# Patient Record
Sex: FEMALE | Race: WHITE | NOT HISPANIC OR LATINO | Employment: PART TIME | URBAN - METROPOLITAN AREA
[De-identification: names, ages, dates, MRNs, and addresses within clinical notes are randomized per-mention and may not be internally consistent; named-entity substitution may affect disease eponyms.]

---
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
## 2020-12-02 ENCOUNTER — IMMUNIZATIONS (OUTPATIENT)
Dept: FAMILY MEDICINE CLINIC | Facility: CLINIC | Age: 20
End: 2020-12-02
Payer: OTHER GOVERNMENT

## 2020-12-02 DIAGNOSIS — Z23 ENCOUNTER FOR IMMUNIZATION: ICD-10-CM

## 2020-12-02 PROCEDURE — 90471 IMMUNIZATION ADMIN: CPT

## 2020-12-02 PROCEDURE — 90686 IIV4 VACC NO PRSV 0.5 ML IM: CPT

## 2020-12-10 ENCOUNTER — ANNUAL EXAM (OUTPATIENT)
Dept: OBGYN CLINIC | Facility: CLINIC | Age: 20
End: 2020-12-10
Payer: OTHER GOVERNMENT

## 2020-12-10 DIAGNOSIS — Z01.419 ENCOUNTER FOR ANNUAL ROUTINE GYNECOLOGICAL EXAMINATION: Primary | ICD-10-CM

## 2020-12-10 PROCEDURE — 99395 PREV VISIT EST AGE 18-39: CPT | Performed by: OBSTETRICS & GYNECOLOGY

## 2021-04-01 ENCOUNTER — OFFICE VISIT (OUTPATIENT)
Dept: FAMILY MEDICINE CLINIC | Facility: CLINIC | Age: 21
End: 2021-04-01
Payer: OTHER GOVERNMENT

## 2021-04-01 VITALS
DIASTOLIC BLOOD PRESSURE: 70 MMHG | WEIGHT: 133.2 LBS | SYSTOLIC BLOOD PRESSURE: 128 MMHG | RESPIRATION RATE: 14 BRPM | BODY MASS INDEX: 24.51 KG/M2 | TEMPERATURE: 97.7 F | HEART RATE: 80 BPM | HEIGHT: 62 IN

## 2021-04-01 DIAGNOSIS — R20.0 NUMBNESS AND TINGLING IN BOTH HANDS: Primary | ICD-10-CM

## 2021-04-01 DIAGNOSIS — R20.2 NUMBNESS AND TINGLING IN BOTH HANDS: Primary | ICD-10-CM

## 2021-04-01 PROCEDURE — 99213 OFFICE O/P EST LOW 20 MIN: CPT | Performed by: PHYSICIAN ASSISTANT

## 2021-04-01 NOTE — PROGRESS NOTES
Assessment/Plan:    1  Numbness and tingling in both hands    - discussed at length, probably due to over use, advised to cut back on texting, knitting, get ergonomic set up for computer for college, try ice and aleve after work although recommended patient should try taking a week of two off work to see if it improves, financially will not work out for her  Get wrist splint to wear for school and work  Can continue to lift weights and work out but if this aggravates to stop  F/u as needed        Subjective:   Chief Complaint   Patient presents with    Numbness and tingling in bilateral hands      Patient ID: Sohan Pelayo is a 21 y o  female  Patient here c/o pins and needles in your hands and feet  Getting progressively worse  Wakes and will be numb, will get better then will happen as day goes on  Pain shoots up to elbow now also  Has been working since beginning of November, while going to school full time and noted this is when it began  Works at [employer], "very hands on"  Numbness started then in November, got better, then got worse again past 3 weeks  But has been working less  Notes she is a only her phone and computer a lot of school, has taken up knitting again, goes to the gym almost daily  Right hand worse then left but is right handed  Noted similar in her foot but thinks that is shoe related, stopped shoes and felt better        The following portions of the patient's history were reviewed and updated as appropriate: allergies, current medications, past family history, past medical history, past social history, past surgical history and problem list     Past Medical History:   Diagnosis Date    Allergic rhinitis 04/07/2010    Anal fissure     last assessed 5/26/15    Dyspepsia 04/07/2010     Past Surgical History:   Procedure Laterality Date    NO PAST SURGERIES       Family History   Problem Relation Age of Onset    Hypothyroidism Mother    [facility] Migraines Mother     No Known Problems Father     Colon cancer Family     Prostate cancer Paternal Grandfather     Substance Abuse Neg Hx     Mental illness Neg Hx     Alcohol abuse Neg Hx      Social History     Socioeconomic History    Marital status: Single     Spouse name: Not on file    Number of children: Not on file    Years of education: Not on file    Highest education level: Not on file   Occupational History    Not on file   Social Needs    Financial resource strain: Not on file    Food insecurity     Worry: Not on file     Inability: Not on file    Transportation needs     Medical: Not on file     Non-medical: Not on file   Tobacco Use    Smoking status: Never Smoker    Smokeless tobacco: Never Used   Substance and Sexual Activity    Alcohol use: Yes     Comment: Socially    Drug use: No    Sexual activity: Yes     Partners: Male     Birth control/protection: I U D  Lifestyle    Physical activity     Days per week: Not on file     Minutes per session: Not on file    Stress: Not on file   Relationships    Social connections     Talks on phone: Not on file     Gets together: Not on file     Attends Baptism service: Not on file     Active member of club or organization: Not on file     Attends meetings of clubs or organizations: Not on file     Relationship status: Not on file    Intimate partner violence     Fear of current or ex partner: Not on file     Emotionally abused: Not on file     Physically abused: Not on file     Forced sexual activity: Not on file   Other Topics Concern    Not on file   Social History Narrative    Always uses seatbelt      Occasional caffeine consumption       Current Outpatient Medications:     Ascorbic Acid (VITAMIN C PO), Take by mouth, Disp: , Rfl:     B Complex-Biotin-FA (VITAMIN B50 COMPLEX PO), Take by mouth, Disp: , Rfl:     COLLAGEN PO, Take by mouth, Disp: , Rfl:     Glucosamine-Chondroitin (GLUCOSAMINE CHONDR COMPLEX PO), Take by mouth, Disp: , Rfl:     levonorgestrel (KYLEENA) 19 5 MG intrauterine device, 1 Intra Uterine Device by Intrauterine route once, Disp: , Rfl:     Multiple Vitamins-Minerals (ONE-A-DAY WOMENS PO), Take by mouth, Disp: , Rfl:     Probiotic Product (PROBIOTIC DAILY PO), Take by mouth, Disp: , Rfl:     Review of Systems          Objective:    Vitals:    04/01/21 1119   BP: 128/70   BP Location: Left arm   Patient Position: Sitting   Cuff Size: Standard   Pulse: 80   Resp: 14   Temp: 97 7 °F (36 5 °C)   TempSrc: Oral   Weight: 60 4 kg (133 lb 3 2 oz)   Height: 5' 2 25" (1 581 m)        Physical Exam  Constitutional:       Appearance: Normal appearance  Cardiovascular:      Rate and Rhythm: Normal rate and regular rhythm  Pulses: Normal pulses  Heart sounds: Normal heart sounds  Pulmonary:      Effort: Pulmonary effort is normal    Musculoskeletal:      Comments: B/l elbows none tender, full ROM, b/l wrists and hands none tender, full ROM  Negative Tinels, positive Phalens, Full strength and sensation b/l   Neurological:      General: No focal deficit present  Mental Status: She is alert and oriented to person, place, and time  Psychiatric:         Mood and Affect: Mood normal          Behavior: Behavior normal          Thought Content:  Thought content normal          Judgment: Judgment normal

## 2021-04-13 DIAGNOSIS — Z23 ENCOUNTER FOR IMMUNIZATION: ICD-10-CM

## 2021-09-16 ENCOUNTER — OFFICE VISIT (OUTPATIENT)
Dept: OBGYN CLINIC | Facility: CLINIC | Age: 21
End: 2021-09-16
Payer: OTHER GOVERNMENT

## 2021-09-16 VITALS
DIASTOLIC BLOOD PRESSURE: 76 MMHG | WEIGHT: 140.2 LBS | HEIGHT: 62 IN | BODY MASS INDEX: 25.8 KG/M2 | SYSTOLIC BLOOD PRESSURE: 122 MMHG

## 2021-09-16 DIAGNOSIS — N93.0 POSTCOITAL BLEEDING: Primary | ICD-10-CM

## 2021-09-16 DIAGNOSIS — N89.8 VAGINAL DISCHARGE: ICD-10-CM

## 2021-09-16 PROCEDURE — 99213 OFFICE O/P EST LOW 20 MIN: CPT | Performed by: OBSTETRICS & GYNECOLOGY

## 2021-09-16 PROCEDURE — 87491 CHLMYD TRACH DNA AMP PROBE: CPT | Performed by: OBSTETRICS & GYNECOLOGY

## 2021-09-16 PROCEDURE — 87591 N.GONORRHOEAE DNA AMP PROB: CPT | Performed by: OBSTETRICS & GYNECOLOGY

## 2021-09-16 PROCEDURE — 87210 SMEAR WET MOUNT SALINE/INK: CPT | Performed by: OBSTETRICS & GYNECOLOGY

## 2021-09-16 NOTE — PROGRESS NOTES
Assessment/Plan:      postcoital bleeding-I reviewed this with her  It was very minimal and is not occurring with every episode of intercourse  We discussed causes  Chlamydia and gonorrhea done today  Wet mount was normal   It could be because her IUD is due to be changed  We discussed doing a Pap but she is returning in December for a Pap and she will be 21 at that time so we will do then  She will schedule IUD removal and reinsertion for November as she would like another one  She will return for her yearly in December  There are no diagnoses linked to this encounter  Subjective:     Patient ID: Aiyana Plascencia is a 21 y o  female  Patient presents for evaluation of bleeding with intercourse  She has a Elveria Maxine IUD placed   This is due to be removed soon  She does get a menstrual cycle, every 3-7 weeks with light bleeding that lasts 5 days  She does have a new partner and 1 month ago she had a very minimal amount of bleeding right after intercourse  It did not last   Then, on Sunday she had some pain with sexual activity and noted a small amount of bleed  She did not need a panty liner or pad  She had intercourse twice in between those 2 episodes with no bleeding  She denies any unusual discharge  Review of Systems   Genitourinary: Positive for vaginal bleeding  Objective:     Physical Exam  Genitourinary:     General: Normal vulva  Vagina: Normal       Cervix: Discharge present  No cervical motion tenderness, friability, lesion, erythema, cervical bleeding or eversion  Uterus: Normal        Comments:   IUD string visible, white clumpy discharge noted  Chlamydia and gonorrhea done

## 2021-09-18 LAB
C TRACH DNA SPEC QL NAA+PROBE: NEGATIVE
N GONORRHOEA DNA SPEC QL NAA+PROBE: NEGATIVE

## 2021-09-20 ENCOUNTER — TELEPHONE (OUTPATIENT)
Dept: OBGYN CLINIC | Facility: CLINIC | Age: 21
End: 2021-09-20

## 2021-09-20 NOTE — TELEPHONE ENCOUNTER
----- Message from Ashley Murray Sa sent at 9/16/2021  6:02 PM EDT -----  Regarding: Sherron Lugo  Patient is coming in on 11/19/2021 at 3:30 for Sherron Lugo Insertion in the [facility] office

## 2021-10-08 ENCOUNTER — OFFICE VISIT (OUTPATIENT)
Dept: FAMILY MEDICINE CLINIC | Facility: CLINIC | Age: 21
End: 2021-10-08
Payer: OTHER GOVERNMENT

## 2021-10-08 VITALS
BODY MASS INDEX: 26.11 KG/M2 | WEIGHT: 141.9 LBS | HEART RATE: 80 BPM | DIASTOLIC BLOOD PRESSURE: 76 MMHG | RESPIRATION RATE: 15 BRPM | SYSTOLIC BLOOD PRESSURE: 112 MMHG | HEIGHT: 62 IN

## 2021-10-08 DIAGNOSIS — R53.83 FATIGUE, UNSPECIFIED TYPE: ICD-10-CM

## 2021-10-08 DIAGNOSIS — R39.9 URINARY SYMPTOM OR SIGN: Primary | ICD-10-CM

## 2021-10-08 LAB
SL AMB  POCT GLUCOSE, UA: NEGATIVE
SL AMB LEUKOCYTE ESTERASE,UA: ABNORMAL
SL AMB POCT BILIRUBIN,UA: NEGATIVE
SL AMB POCT BLOOD,UA: ABNORMAL
SL AMB POCT CLARITY,UA: CLEAR
SL AMB POCT COLOR,UA: YELLOW
SL AMB POCT KETONES,UA: ABNORMAL
SL AMB POCT NITRITE,UA: NEGATIVE
SL AMB POCT PH,UA: 6
SL AMB POCT SPECIFIC GRAVITY,UA: 1
SL AMB POCT URINE PROTEIN: ABNORMAL
SL AMB POCT UROBILINOGEN: 0.2

## 2021-10-08 PROCEDURE — 81002 URINALYSIS NONAUTO W/O SCOPE: CPT | Performed by: PHYSICIAN ASSISTANT

## 2021-10-08 PROCEDURE — 99214 OFFICE O/P EST MOD 30 MIN: CPT | Performed by: PHYSICIAN ASSISTANT

## 2021-10-13 ENCOUNTER — TELEPHONE (OUTPATIENT)
Dept: FAMILY MEDICINE CLINIC | Facility: CLINIC | Age: 21
End: 2021-10-13

## 2021-10-15 ENCOUNTER — APPOINTMENT (OUTPATIENT)
Dept: LAB | Facility: CLINIC | Age: 21
End: 2021-10-15
Payer: OTHER GOVERNMENT

## 2021-10-15 DIAGNOSIS — R39.9 URINARY SYMPTOM OR SIGN: ICD-10-CM

## 2021-10-15 DIAGNOSIS — R53.83 FATIGUE, UNSPECIFIED TYPE: ICD-10-CM

## 2021-10-15 LAB
ALBUMIN SERPL BCP-MCNC: 3.8 G/DL (ref 3.5–5)
ALP SERPL-CCNC: 56 U/L (ref 46–116)
ALT SERPL W P-5'-P-CCNC: 24 U/L (ref 12–78)
ANION GAP SERPL CALCULATED.3IONS-SCNC: 3 MMOL/L (ref 4–13)
AST SERPL W P-5'-P-CCNC: 16 U/L (ref 5–45)
BASOPHILS # BLD AUTO: 0.05 THOUSANDS/ΜL (ref 0–0.1)
BASOPHILS NFR BLD AUTO: 1 % (ref 0–1)
BILIRUB SERPL-MCNC: 0.41 MG/DL (ref 0.2–1)
BUN SERPL-MCNC: 20 MG/DL (ref 5–25)
CALCIUM SERPL-MCNC: 9.3 MG/DL (ref 8.3–10.1)
CHLORIDE SERPL-SCNC: 107 MMOL/L (ref 100–108)
CO2 SERPL-SCNC: 27 MMOL/L (ref 21–32)
CREAT SERPL-MCNC: 0.79 MG/DL (ref 0.6–1.3)
EOSINOPHIL # BLD AUTO: 0.33 THOUSAND/ΜL (ref 0–0.61)
EOSINOPHIL NFR BLD AUTO: 4 % (ref 0–6)
ERYTHROCYTE [DISTWIDTH] IN BLOOD BY AUTOMATED COUNT: 12.2 % (ref 11.6–15.1)
GFR SERPL CREATININE-BSD FRML MDRD: 108 ML/MIN/1.73SQ M
GLUCOSE P FAST SERPL-MCNC: 85 MG/DL (ref 65–99)
HCT VFR BLD AUTO: 42 % (ref 34.8–46.1)
HGB BLD-MCNC: 14.1 G/DL (ref 11.5–15.4)
IMM GRANULOCYTES # BLD AUTO: 0.02 THOUSAND/UL (ref 0–0.2)
IMM GRANULOCYTES NFR BLD AUTO: 0 % (ref 0–2)
LYMPHOCYTES # BLD AUTO: 2.33 THOUSANDS/ΜL (ref 0.6–4.47)
LYMPHOCYTES NFR BLD AUTO: 28 % (ref 14–44)
MCH RBC QN AUTO: 30.5 PG (ref 26.8–34.3)
MCHC RBC AUTO-ENTMCNC: 33.6 G/DL (ref 31.4–37.4)
MCV RBC AUTO: 91 FL (ref 82–98)
MONOCYTES # BLD AUTO: 1.13 THOUSAND/ΜL (ref 0.17–1.22)
MONOCYTES NFR BLD AUTO: 14 % (ref 4–12)
NEUTROPHILS # BLD AUTO: 4.39 THOUSANDS/ΜL (ref 1.85–7.62)
NEUTS SEG NFR BLD AUTO: 53 % (ref 43–75)
NRBC BLD AUTO-RTO: 0 /100 WBCS
PLATELET # BLD AUTO: 240 THOUSANDS/UL (ref 149–390)
PMV BLD AUTO: 10.8 FL (ref 8.9–12.7)
POTASSIUM SERPL-SCNC: 3.9 MMOL/L (ref 3.5–5.3)
PROT SERPL-MCNC: 7 G/DL (ref 6.4–8.2)
RBC # BLD AUTO: 4.63 MILLION/UL (ref 3.81–5.12)
SODIUM SERPL-SCNC: 137 MMOL/L (ref 136–145)
TSH SERPL DL<=0.05 MIU/L-ACNC: 2.02 UIU/ML (ref 0.46–3.98)
WBC # BLD AUTO: 8.25 THOUSAND/UL (ref 4.31–10.16)

## 2021-10-15 PROCEDURE — 84443 ASSAY THYROID STIM HORMONE: CPT

## 2021-10-15 PROCEDURE — 80053 COMPREHEN METABOLIC PANEL: CPT

## 2021-10-15 PROCEDURE — 36415 COLL VENOUS BLD VENIPUNCTURE: CPT

## 2021-10-15 PROCEDURE — 85025 COMPLETE CBC W/AUTO DIFF WBC: CPT

## 2021-10-19 ENCOUNTER — TELEPHONE (OUTPATIENT)
Dept: FAMILY MEDICINE CLINIC | Facility: CLINIC | Age: 21
End: 2021-10-19

## 2021-11-09 ENCOUNTER — PROCEDURE VISIT (OUTPATIENT)
Dept: OBGYN CLINIC | Facility: CLINIC | Age: 21
End: 2021-11-09
Payer: OTHER GOVERNMENT

## 2021-11-09 VITALS
HEIGHT: 62 IN | WEIGHT: 143.6 LBS | DIASTOLIC BLOOD PRESSURE: 72 MMHG | BODY MASS INDEX: 26.43 KG/M2 | SYSTOLIC BLOOD PRESSURE: 114 MMHG

## 2021-11-09 DIAGNOSIS — Z30.433 ENCOUNTER FOR REMOVAL AND REINSERTION OF INTRAUTERINE CONTRACEPTIVE DEVICE (IUD): Primary | ICD-10-CM

## 2021-11-09 PROCEDURE — 58300 INSERT INTRAUTERINE DEVICE: CPT | Performed by: OBSTETRICS & GYNECOLOGY

## 2021-11-09 PROCEDURE — 58301 REMOVE INTRAUTERINE DEVICE: CPT | Performed by: OBSTETRICS & GYNECOLOGY

## 2021-12-16 ENCOUNTER — ANNUAL EXAM (OUTPATIENT)
Dept: OBGYN CLINIC | Facility: CLINIC | Age: 21
End: 2021-12-16
Payer: OTHER GOVERNMENT

## 2021-12-16 VITALS
BODY MASS INDEX: 25.41 KG/M2 | WEIGHT: 143.4 LBS | HEIGHT: 63 IN | SYSTOLIC BLOOD PRESSURE: 116 MMHG | DIASTOLIC BLOOD PRESSURE: 74 MMHG

## 2021-12-16 DIAGNOSIS — Z01.419 ENCOUNTER FOR ANNUAL ROUTINE GYNECOLOGICAL EXAMINATION: Primary | ICD-10-CM

## 2021-12-16 DIAGNOSIS — Z12.4 CERVICAL CANCER SCREENING: ICD-10-CM

## 2021-12-16 PROCEDURE — G0145 SCR C/V CYTO,THINLAYER,RESCR: HCPCS | Performed by: OBSTETRICS & GYNECOLOGY

## 2021-12-16 PROCEDURE — 99395 PREV VISIT EST AGE 18-39: CPT | Performed by: OBSTETRICS & GYNECOLOGY

## 2021-12-22 LAB
LAB AP GYN PRIMARY INTERPRETATION: NORMAL
Lab: NORMAL
PATH INTERP SPEC-IMP: NORMAL

## 2022-05-19 ENCOUNTER — TELEPHONE (OUTPATIENT)
Dept: FAMILY MEDICINE CLINIC | Facility: CLINIC | Age: 22
End: 2022-05-19

## 2022-05-19 DIAGNOSIS — Z71.84 ENCOUNTER FOR COUNSELING FOR TRAVEL: Primary | ICD-10-CM

## 2022-05-19 NOTE — TELEPHONE ENCOUNTER
Please enter an order for a PCR test for travel    She will go to an urgent care to get it done      [phone]

## 2023-03-13 ENCOUNTER — TRANSCRIBE ORDERS (OUTPATIENT)
Dept: SCHEDULING | Age: 23
End: 2023-03-13

## 2023-03-13 DIAGNOSIS — N94.10 DYSPAREUNIA IN FEMALE: Primary | ICD-10-CM

## 2023-03-13 DIAGNOSIS — M62.89 OTHER SPECIFIED DISORDERS OF MUSCLE: ICD-10-CM

## 2023-03-14 ENCOUNTER — OFFICE VISIT (OUTPATIENT)
Dept: FAMILY MEDICINE CLINIC | Facility: CLINIC | Age: 23
End: 2023-03-14

## 2023-03-14 VITALS
HEART RATE: 60 BPM | BODY MASS INDEX: 26.87 KG/M2 | HEIGHT: 62 IN | SYSTOLIC BLOOD PRESSURE: 114 MMHG | RESPIRATION RATE: 16 BRPM | WEIGHT: 146 LBS | OXYGEN SATURATION: 99 % | DIASTOLIC BLOOD PRESSURE: 76 MMHG

## 2023-03-14 DIAGNOSIS — F32.A DEPRESSION, UNSPECIFIED DEPRESSION TYPE: ICD-10-CM

## 2023-03-14 DIAGNOSIS — Z23 IMMUNIZATION DUE: ICD-10-CM

## 2023-03-14 DIAGNOSIS — Z11.1 SCREENING-PULMONARY TB: ICD-10-CM

## 2023-03-14 DIAGNOSIS — R53.83 FATIGUE, UNSPECIFIED TYPE: ICD-10-CM

## 2023-03-14 DIAGNOSIS — Z00.00 ANNUAL PHYSICAL EXAM: Primary | ICD-10-CM

## 2023-03-14 NOTE — PROGRESS NOTES
Assessment/Plan:    1  Depression - sounds like PMDD, will refer to psych for formal evaluation as syn thought possible bipolar and family history is present  Will check TSH  If psych dx depression discussed coming back to use to start prozac  Patient and boyfriend aware of plan  F/u as needed  F/u after eval    Subjective:   Chief Complaint   Patient presents with   • Physical Exam      Patient ID: Shannon Astudillo is a 25 y o  female  The patient reports since graduating patient has noted she has a week or two of month where she feels depressed, down, doesn't want to do anything, doesn't want to talk to anyone, then resolves and for 2-3 weeks feels herself but then returns again for 1-2 weeks  Very cyclical so talked to gyn and said she should come here to discuss this as possible bipolar  Notes it starts what would be mid cycle and then lasts until seh gets her period then improves  During the time she is feeling herself goes to work, happy, active, sleeps 7-8 hours a day  During the weeks she is down sleeps 8+ hours, always wants to sleep  Denies SI, HI  Working a desk job and a coffee shop  Realized she is not happy working desk job and work on this, currently applying to [employer] and starting to shadow a PT  Grandfather was bipolar  Mom with hypothyroid and mild depression             The following portions of the patient's history were reviewed and updated as appropriate: allergies, current medications, past family history, past medical history, past social history, past surgical history and problem list     Past Medical History:   Diagnosis Date   • Allergic rhinitis 04/07/2010   • Anal fissure     last assessed 5/26/15   • Dyspepsia 04/07/2010     Past Surgical History:   Procedure Laterality Date   • NO PAST SURGERIES       Family History   Problem Relation Age of Onset   • Hypothyroidism Mother    • Migraines Mother    • No Known Problems Father    • Mental illness Maternal Grandfather    • Bipolar disorder Maternal Grandfather    • Prostate cancer Paternal Grandfather    • Colon cancer Family    • Substance Abuse Neg Hx    • Alcohol abuse Neg Hx      Social History     Socioeconomic History   • Marital status: Single     Spouse name: Not on file   • Number of children: Not on file   • Years of education: Not on file   • Highest education level: Not on file   Occupational History   • Not on file   Tobacco Use   • Smoking status: Never   • Smokeless tobacco: Never   Vaping Use   • Vaping Use: Never used   Substance and Sexual Activity   • Alcohol use: Yes     Comment: Socially   • Drug use: No   • Sexual activity: Yes     Partners: Male     Birth control/protection: I U D  Other Topics Concern   • Not on file   Social History Narrative    Always uses seatbelt  Occasional caffeine consumption     Social Determinants of Health     Financial Resource Strain: Not on file   Food Insecurity: Not on file   Transportation Needs: Not on file   Physical Activity: Not on file   Stress: Not on file   Social Connections: Not on file   Intimate Partner Violence: Not on file   Housing Stability: Not on file       Current Outpatient Medications:   •  Ascorbic Acid (VITAMIN C PO), Take by mouth, Disp: , Rfl:   •  COLLAGEN PO, Take by mouth, Disp: , Rfl:   •  Glucosamine-Chondroitin (GLUCOSAMINE CHONDR COMPLEX PO), Take by mouth, Disp: , Rfl:   •  levonorgestrel (KYLEENA) 19 5 MG intrauterine device, 1 Intra Uterine Device by Intrauterine route once, Disp: , Rfl:   •  Multiple Vitamins-Minerals (ONE-A-DAY WOMENS PO), Take by mouth, Disp: , Rfl:     Review of Systems          Objective:    Vitals:    03/14/23 0906   BP: 114/76   BP Location: Left arm   Patient Position: Sitting   Cuff Size: Standard   Pulse: 60   Resp: 16   SpO2: 99%   Weight: 66 2 kg (146 lb)   Height: 5' 2" (1 575 m)        Physical Exam  Constitutional:       Appearance: Normal appearance  HENT:      Head: Normocephalic and atraumatic  Cardiovascular:      Rate and Rhythm: Normal rate and regular rhythm  Pulses: Normal pulses  Heart sounds: Normal heart sounds  Pulmonary:      Effort: Pulmonary effort is normal       Breath sounds: Normal breath sounds  Musculoskeletal:         General: Normal range of motion  Skin:     General: Skin is warm  Neurological:      General: No focal deficit present  Mental Status: She is alert and oriented to person, place, and time  Psychiatric:         Mood and Affect: Mood normal          Behavior: Behavior normal          Thought Content:  Thought content normal          Judgment: Judgment normal

## 2023-03-14 NOTE — PROGRESS NOTES
ADULT ANNUAL PHYSICAL  [facility]    NAME: Heidi Cage  AGE: 25 y o  SEX: female  : 2000     DATE: 3/14/2023     Assessment and Plan:     Healthy 25year old female    Immunizations and preventive care screenings were discussed with patient today  Appropriate education was printed on patient's after visit summary  Counseling:  Alcohol/drug use: discussed moderation in alcohol intake, the recommendations for healthy alcohol use, and avoidance of illicit drug use  Dental Health: discussed importance of regular tooth brushing, flossing, and dental visits  Injury prevention: discussed safety/seat belts, safety helmets, smoke detectors, carbon dioxide detectors, and smoking near bedding or upholstery  · Exercise: the importance of regular exercise/physical activity was discussed  Recommend exercise 3-5 times per week for at least 30 minutes  · Tdap, Flu - today  · PPD placed today followup 48-72 hrs to have read, will hold on to paper work until then         Return in 1 year (on 3/14/2024)  Chief Complaint:     Chief Complaint   Patient presents with   • Physical Exam      History of Present Illness:     Adult Annual Physical   Patient here for a comprehensive physical exam  The patient reports since graduating patient has noted she has a week of month where she feels depressed, down, doesn't want to do anything, doesn't want to talk to anyone, then in a few weeks feels fine  Then a few weeks back to normal  Denies SI, HI  Diet and Physical Activity  · Diet/Nutrition: well balanced diet  · Exercise: moderate cardiovascular exercise        Depression Screening  PHQ-2/9 Depression Screening    Little interest or pleasure in doing things: 0 - not at all  Feeling down, depressed, or hopeless: 0 - not at all  PHQ-2 Score: 0  PHQ-2 Interpretation: Negative depression screen       General Health  · Sleep: sleeps well and gets more than 8 hours of sleep on average  · Hearing: normal - bilateral   · Vision: goes for regular eye exams, most recent eye exam <1 year ago and wears glasses  · Dental: regular dental visits and brushes teeth twice daily  /GYN Health  · Last menstrual period: monthly, light  · Contraceptive method: IUD placement  · History of STDs?: no   · Pap done 12/2021     Review of Systems:     Review of Systems   Constitutional: Negative  HENT: Negative  Eyes: Negative  Respiratory: Negative  Cardiovascular: Negative  Gastrointestinal: Negative  Endocrine: Negative  Genitourinary: Negative  Musculoskeletal: Negative  Skin: Negative  Allergic/Immunologic: Negative  Neurological: Negative  Hematological: Negative  Psychiatric/Behavioral: Negative  Past Medical History:     Past Medical History:   Diagnosis Date   • Allergic rhinitis 04/07/2010   • Anal fissure     last assessed 5/26/15   • Dyspepsia 04/07/2010      Past Surgical History:     Past Surgical History:   Procedure Laterality Date   • NO PAST SURGERIES        Social History:     Social History     Socioeconomic History   • Marital status: Single     Spouse name: None   • Number of children: None   • Years of education: None   • Highest education level: None   Occupational History   • None   Tobacco Use   • Smoking status: Never   • Smokeless tobacco: Never   Vaping Use   • Vaping Use: Never used   Substance and Sexual Activity   • Alcohol use: Yes     Comment: Socially   • Drug use: No   • Sexual activity: Yes     Partners: Male     Birth control/protection: I U D  Other Topics Concern   • None   Social History Narrative    Always uses seatbelt      Occasional caffeine consumption     Social Determinants of Health     Financial Resource Strain: Not on file   Food Insecurity: Not on file   Transportation Needs: Not on file   Physical Activity: Not on file   Stress: Not on file   Social Connections: Not on file   Intimate Partner Violence: Not on file   Housing Stability: Not on file      Family History:     Family History   Problem Relation Age of Onset   • Hypothyroidism Mother    • Migraines Mother    • No Known Problems Father    • Colon cancer Family    • Prostate cancer Paternal Grandfather    • Substance Abuse Neg Hx    • Mental illness Neg Hx    • Alcohol abuse Neg Hx       Current Medications:     Current Outpatient Medications   Medication Sig Dispense Refill   • Ascorbic Acid (VITAMIN C PO) Take by mouth     • COLLAGEN PO Take by mouth     • Glucosamine-Chondroitin (GLUCOSAMINE CHONDR COMPLEX PO) Take by mouth     • levonorgestrel (KYLEENA) 19 5 MG intrauterine device 1 Intra Uterine Device by Intrauterine route once     • Multiple Vitamins-Minerals (ONE-A-DAY WOMENS PO) Take by mouth     • B Complex-Biotin-FA (VITAMIN B50 COMPLEX PO) Take by mouth (Patient not taking: Reported on 3/14/2023)     • Probiotic Product (PROBIOTIC DAILY PO) Take by mouth (Patient not taking: Reported on 9/16/2021)       No current facility-administered medications for this visit  Allergies:     No Known Allergies   Physical Exam:     /76 (BP Location: Left arm, Patient Position: Sitting, Cuff Size: Standard)   Pulse 60   Resp 16   Ht 5' 2" (1 575 m)   Wt 66 2 kg (146 lb)   SpO2 99%   BMI 26 70 kg/m²     Physical Exam  Constitutional:       Appearance: Normal appearance  She is well-developed and normal weight  HENT:      Head: Normocephalic and atraumatic  Right Ear: Hearing normal       Left Ear: Hearing normal       Mouth/Throat:      Pharynx: Uvula midline  Eyes:      Extraocular Movements: Extraocular movements intact  Conjunctiva/sclera: Conjunctivae normal       Pupils: Pupils are equal, round, and reactive to light  Neck:      Thyroid: No thyromegaly  Cardiovascular:      Rate and Rhythm: Normal rate and regular rhythm  Pulses: Normal pulses        Heart sounds: Normal heart sounds  No murmur heard  Pulmonary:      Effort: Pulmonary effort is normal       Breath sounds: Normal breath sounds  Abdominal:      General: Abdomen is flat  Bowel sounds are normal  There is no distension  Palpations: Abdomen is soft  There is no mass  Tenderness: There is no abdominal tenderness  Musculoskeletal:         General: Normal range of motion  Cervical back: Normal range of motion and neck supple  Lymphadenopathy:      Cervical: No cervical adenopathy  Skin:     General: Skin is warm  Neurological:      General: No focal deficit present  Mental Status: She is alert and oriented to person, place, and time  Cranial Nerves: No cranial nerve deficit  Deep Tendon Reflexes: Reflexes normal    Psychiatric:         Mood and Affect: Mood normal          Behavior: Behavior normal          Thought Content: Thought content normal          Judgment: Judgment normal           Char Wallace PA-C   [address]     BMI Counseling: Body mass index is 26 7 kg/m²  The BMI is above normal  Nutrition recommendations include reducing portion sizes

## 2023-03-15 ENCOUNTER — TELEPHONE (OUTPATIENT)
Dept: PSYCHIATRY | Facility: CLINIC | Age: 23
End: 2023-03-15

## 2023-03-15 NOTE — TELEPHONE ENCOUNTER
Called patient regarding referral to be placed on proper wait list  LVM for patient to call intake dept ([phone]) back

## 2023-03-16 ENCOUNTER — CLINICAL SUPPORT (OUTPATIENT)
Dept: FAMILY MEDICINE CLINIC | Facility: CLINIC | Age: 23
End: 2023-03-16

## 2023-03-16 DIAGNOSIS — Z11.1 ENCOUNTER FOR PPD SKIN TEST READING: Primary | ICD-10-CM

## 2023-03-16 LAB
INDURATION: 0 MM
TB SKIN TEST: NEGATIVE

## 2023-03-20 NOTE — TELEPHONE ENCOUNTER
Patient has been added to the Medication Management and Talk Therapy wait list     Confirmed Insurance: Yes [x][employer]   Location Preference: [place]   Provider Preference: none  Virtual: Yes [] No [x]

## 2023-04-06 ENCOUNTER — TELEPHONE (OUTPATIENT)
Dept: REGISTRATION | Facility: CLINIC | Age: 23
End: 2023-04-06
Payer: COMMERCIAL

## 2023-04-06 ENCOUNTER — HOSPITAL ENCOUNTER (OUTPATIENT)
Dept: PHYSICAL THERAPY | Age: 23
Setting detail: THERAPIES SERIES
Discharge: HOME | End: 2023-04-06
Attending: NURSE PRACTITIONER
Payer: COMMERCIAL

## 2023-04-06 DIAGNOSIS — M62.89 OTHER SPECIFIED DISORDERS OF MUSCLE: ICD-10-CM

## 2023-04-06 DIAGNOSIS — N94.10 DYSPAREUNIA IN FEMALE: ICD-10-CM

## 2023-04-06 DIAGNOSIS — M62.89 PELVIC FLOOR DYSFUNCTION: Primary | ICD-10-CM

## 2023-04-06 LAB — TSH SERPL-ACNC: 1.84 MIU/L

## 2023-04-06 PROCEDURE — 97161 PT EVAL LOW COMPLEX 20 MIN: CPT | Mod: GP

## 2023-04-06 PROCEDURE — 97530 THERAPEUTIC ACTIVITIES: CPT | Mod: GP

## 2023-04-06 RX ORDER — MULTIVIT WITH CALCIUM,IRON,MIN 18MG-0.4MG
TABLET ORAL SEE ADMIN INSTRUCTIONS
COMMUNITY

## 2023-04-06 RX ORDER — MULTIVIT-MIN/IRON/FOLIC ACID/K 18-600-40
CAPSULE ORAL SEE ADMIN INSTRUCTIONS
COMMUNITY

## 2023-04-06 NOTE — LETTER
[address] [place] [id number]      Dear DR. Jackson,      Thank you for this referral. Please review the attached notes and plan of care for your approval.  Please contact our department with any questions.     Sincerely,     Andree Kim, PT      Referring Provider: By co-signing this Plan of Care (POC) either electronically or physically you agree to the following:    I have reviewed the the Plan of Care established by the therapist within this document and certify that the services are skilled and medically necessary. I have reviewed the plan and recommend that these services continue to meet the goals stated in this document.       EXTERNAL PROVIDER FAXING BACK:    PHYSICIAN SIGNATURE: __________________________________     DATE: ___________________  TIME: _____________    IMPORTANT:  If returning this Plan of Care by fax, please fax back ONLY the signature page.   _________________________________________________________________________      [facility] Fax: [phone]        PT EVALUATION FOR OUTPATIENT THERAPY    Patient: Mini Hodges  MRN: [id number]  : 2000 22 y.o.   Referring Physician: Marbella Jackson CRNP  Date of Visit: 2023      Certification Dates:  23 through 23         Recommended Frequency & Duration:  1 time/week for up to 3 months     Diagnosis:   1. Pelvic floor dysfunction    2. Dyspareunia in female    3. Other specified disorders of muscle        Chief Complaints: No chief complaint on file.      Precautions: no known precautions/restrictions  Precautions additional comments:      Past Medical History: No past medical history on file.    Past Surgical History: No past surgical history on file.      LEARNING ASSESSMENT    Assessment completed:  Yes    Learner name:  Mini Hodges    Learner: Patient    Learning Barriers:  Learning barriers: No Barriers    Preferred Language: English     Needed: No    Education Provided:   Method:  Discussion and Demonstration  Readiness: acceptance  Response: Demonstrated understanding and Verbalizes understanding      CO-LEARNER ASSESSMENT:    Completed: No          Welcome letter discussed: Yes Patient provided with Welcome Letter, which includes attendance policy. Provided education regarding cancellation and no-show policy. Education regarding the importance of participation and regular attendance to maximize goal attainment.         OBJECTIVE MEASUREMENTS/DATA:    Time In Session:  Start Time: 0901  Stop Time: 1000  Time Calculation (min): 59 min   Assessment and Plan - 04/06/23 0859        Assessment    Plan of Care reviewed and patient/family in agreement No     System Pathology/Pathophysiology Noted musculoskeletal;neuromuscular;genitourinary     Problem List abnormal muscle tone;decreased endurance;decreased strength;decreased flexibility;impaired coordination;pain     Clinical Assessment Mini is a 23 y/o cis gender female presenting to PFPT with c/o pelvic floor dysfunction including dyspareunia and pain with pelvic exams. She appear to have muscle guarding present with TTP reported in diaphram and lower abdomen which could be contributing to her symptoms. Due to extensive subjective history, unable to complete objective assessment today, plan to continue gathering objective measures at subsequent visits. Pt educated on abdominal tension awareness and diaphragmatic breathing. She will benefit from continued skilled PT to reduce pain and improve function of PFm.     Plan and Recommendations 1x a week for 6-8 visits, biweekly 4-6 visits     Planned Services CPT 12212 Therapeutic exercises;CPT 97646 Therapeutic activities;CPT 45104 Self-care/Home management training;CPT 01230 Neuromuscular Reeducation;CPT 87027 Manual therapy;CPT 79861 Gait training                General Information - 04/06/23 0859        Session Details    Document Type initial evaluation     Mode of Treatment individual therapy         General Information    Referring Physician ABENA Talbert     History of present illness/functional impairment Mini is a 21 y/o cis gender female referred to PFPT by obgyn with c/o dyspareunia and pain with pelvic exams.     Existing Precautions/Restrictions no known precautions/restrictions                Pain/Vitals - 04/06/23 0859        Pain Assessment    Currently in pain No/Denies        Pre Activity Vital Signs    /80     BP Location Left upper arm     BP Method Manual     Patient Position Sitting                Falls/Food Screening - 04/06/23 0859        Initial Falls Assessment    One or more falls in the last year No                PT - 04/06/23 0859        Physical Therapy    Physical Therapy Specialty Pelvic Floor Program: Age 14+        PT Plan    Frequency of treatment 1 time/week     PT Duration 3 months     PT Cert From 04/06/23     PT Cert To 07/05/23     Date PT POC was sent to provider 04/06/23     Signed PT Plan of Care received?  No                   Outcome Measures    PT Outcome Measures - 04/06/23 0859        NEW (2/6/23):  PSFS     PSFS ACTIVITY 1 sexual activity     PSFS ANSWER 1 3     PSFS ACTIVITY 2 Gynecology exams     PSFS ANSWER 2 5     PSFS Sum of Activity Scores 8     PSFS Number of Activities 2     PSFS Percent Score 40 %                  Goals     •  Mutually agreed upon pain goal       Mutually agreed upon pain goal: 0/10      •  pelvic goals (pt-stated)       In 8 weeks Mini will:     Be (I) with pelvic floor contraction/ relaxation/ bearing down in order to improve normal bowel and bladder function.     (I) with diaphragmatic breathing for improved Pfm tissue mobility     be able to tolerate digital palpation of pelvic floor as well as digital insertion and removal with pain reduce by 30% in order to tolerate pelvic exams.     improve PSFS by 30% in order to improve QOL as well as pelvic floor function.     In 16 weeks Mini will:   (I) with HEP in order  to maintain and aide in gains made with skilled PT services    (I) with toilet posture and bowel management in order to improve bowel function-     Increase fiber intake by 10-15 grams in order to improve bowel function.     be able to tolerate digital palpation of pelvic floor as well as digital insertion and removal with pain reduce by 80% in order to tolerate pelvic exams.      improve PSFS by 80% in order to improve QOL as well as pelvic floor function.     be (I) with self abdominal myofascial massage to decrease abdominal pain and dysfunction.     Improve gluteal strength by 1/2 MMT GEO in order to decrease demand on pelvic floor and improve overall functional mobility.     Reduced TTP and fascial restrictions in abdomen by 50% in order to improve pelvic floor function and QOL.    Return to pelvic exams without signs/ symptoms of pelvic floor dysfunction.        •  stop having pain with sexual activity (pt-stated)               TREATMENT PLAN:    Mini is a 21 y/o cis gender female presenting to PFPT with c/o dyspareunia and pain with pelvic exams.  Mini went to the gynecologist about a few month ago, she has been having pain with intercourse and was referred to PFPT. She notes always having pain with insertion.     Bladder: Comment:        Urination frequency 6-7x a day    Urgency:  Can hold if she needs to, will sometimes have cramping since her IUD   Incontinence Denies    Pads denies   Nocturia denies   Pain 1x a night    Emptying Does not need to strain, feels like she fully   Prolapse symptoms  Denies    Liquid consumption Drinks about 3-4 refills of 24 oz water bottle, drinks about 3 cups of coffee, pre workout if she is going to the gym   UTI history When she was first sexually active she had 3 UTIs in a month, has not had a UTI recently          Bowel: Comment:        Frequency 2-3x a day, typically spread throughout the day, does not need to strain   Urge Strong normal urge, can make it   "  Incontinence denies   Emptying Feels fully empty when she finishes   [place] stool Type 3-4   Fiber Will take fiber, green powder, drink more water and then will take laxatives. Will start taking supplements if she misses a day    Does meal prep, oatmeal or breakfast sandwich or eggs/veggies, potatoes or rice with chicken or turkey for lunch, dinner protein starch and veggie, does a lot protein, will snack on rice cakes and fruit or protein bars and yogurt   Management Fiber, green powder, water and laxatives   Pain Had a hemorrhoid once about a few years ago, denies pain with BMs         OBGYN Comment:        Pregnancies 0   Births 0   Birth type n/a   Tears/ episiotomies n/a   Surgery No surgeries   Menstruation IUD has been lighter flow, consistent and regular, lots of cramping but has been less since her IUD. Prior heavier flow, was getting a lot of cramping                    Sexual Activity Comment:        Type Penis vaginal    Pain Initial insertion will be pain, lasts about 2 minutes, feels it reduces after \"muscle relax,\" will have burning/tearing sensation     Does not notice a difference in pain based on position    Tried lubrication once or twice, will have burning but unsure if it was from initial insertion or the lubrication     History of bleeding post intercourse     Will have discomfort for about 5 min    Always has had pain with intercourse     At worst: 7/10   Post intercourse discomfort: 3-4/10    Orgasm Yes    Masturbation Yes, denies pain   Libido Feels it is nonexistence, feels that it could be related to her pain with intercourse    Pain with speculum insertion          Pain Comment:        Low back pain Went for PT previously, has not been noticing back pain since                                Other Comment:        Physical activity  Go to the gym, will do lifting and cardio, walks outside/hiking, 3x a week typically   PLOF Previous history of dancing, did ballet and modern in college "   Living environment Lives with her boyfriend, apartment    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 denies  Problems with stumbling or falling? Denies     Cauda equina questions:  Problems with bowel and bladder control? Specifically retention? See above  Pins and needles or numbness in the saddle area? Denies     Review of systems:  General - (chills, night sweats, recent infection, fever, weight loss/gain, unexplained night pain, excessive fatigue): Denies  Do you have a history of cancer? Denies  Gastrointestinal system - (abdominal pain, bowel changes, nausea, vomiting, bloating): Bloating that is inconsistent, might have it prior to her menstrual period   Cardiovascular system - (chest pain, palpitations, orthopnea, other): Denies  Respiratory system - (cough, SOB, sputum production, other): Denies  Musculoskeletal system: osteoporosis, vertebral fracture? Denies  Endocrine - (polyuria, polydipsia, heat or cold intolerance, other): Denies  Neurological - (numbness/tingling, falling/stumbling, HA, dizziness, diplopia, dysphagia/dysarthria, double vision, tinnitus, memory, drop attacks, other): Denies  Any long-term steroid use? Denies      Patient goals stop having pain with sexual activity     OBJECTIVE FINDINGS:      ASSESSMENT   PELVIS/POSTURE            LUMBAR AROM    Flexion    Extension    rotation    Sidebending        HIP ROM    ER    IR    Flexion    Extension    Ankle ROM    Lower Extremity Strength    hip flexion    hip extension    Hip IR    Hip ER    Hip abduction    Hip adduction    Knee    Ankle            SPECIAL TESTS    Hamstring length    Hip FADIR    Hip SCOUR    Trendelenburg    Slump test    SLR test    BAM            LUMBAR PALPATION    HIP  PALPATION    OTHER PALPATION        SI JOINT TESTING     S/L Compression test    Thigh thrust test    Prone sacral thrust test    Gaenslen test        ABDOMEN    palpation Muscle guarding present and TTP reported with diaphragm palpation and lower abdomen on L>R side   Lower rib angle 90 degrees   Breathing pattern Min to moderate diaphragm engagement with breathing   Diastasis    Skin/ integumentary        BALANCE    GAIT MECHANICS    RUNNING MECHANICS    SQUATTING MECHANICS    LUNGING MECHANICS    BED MOBILITY    SIT TO STAND      Verbal consent give for internal evaluation and treatment. Held today, plan to assess at future visit with pt consent      ASSESSMENT   EXTERNAL genital EXAM    PFM OBSERVATION    Skin integrity    contraction    relaxation    Bulge/pelvic drop    Cough    Light touch sensation    INTERNAL PFM exam Internal vaginal   Levator Ani Strength     Power    Endurance    Repetitions    Quick Flicks            Palpation            TAILBONE                    TREATMENT LOG:  verbal consent for internal intervention: Held today, plan to assess at future visit with pt consent     Diagnosis  Pelvic Floor Dysfunction    Precautions       PT INITIAL EVALUATION:   4/6/23     PT PROGRESS NOTE:        Y/N Treatment Details: Time:   MODALITIES  23923   0 mins   Heat       Ice       THER ACT          68862   10 mins   OUTCOME MEASURES Y PSFS: 40%    Falls Screen, Medication Review, VS, pain assessment Y      Pt Education:verbalized understanding of education and returned demonstration appropriately  Y Pelvic floor anatomy/ purpose function, POC, breathing pattern, abdominal tension influence on PFm, pain cycle of vagina     HEP / HEP Review Y  eval: quadruped diaphragmatic breathing, incorporating breathing throughout day     THER EX         04077   0 mins                                    NEURO RE-ED    43008   Billed with TA    Diaphragmatic breathing y In quadruped, x10                  MANUAL                  33841   0 mins   Joint Mobilization       Soft Tissue mobilizations External      Soft Tissue Mobilization Internal      IASTM                          ASSESSMENT:    This 22 y.o. year old female presents to PT with above stated diagnosis. Physical Therapy evaluation reveals abnormal muscle tone, decreased endurance, decreased strength, decreased flexibility, impaired coordination, pain resulting in   limitations. Mini Hodges will benefit from skilled PT services to address limitation, work towards rehab and patient goals and maximize PLOF of chosen ADLs.     Planned Services: The patient's treatment will include CPT 16415 Therapeutic exercises, CPT 03330 Therapeutic activities, CPT 30663 Self-care/Home management training, CPT 05519 Neuromuscular Reeducation, CPT 75480 Manual therapy, CPT 92989 Gait training, .

## 2023-04-06 NOTE — OP PT TREATMENT LOG
Mini is a 21 y/o cis gender female presenting to PFPT with c/o dyspareunia and pain with pelvic exams.  Mini went to the gynecologist about a few month ago, she has been having pain with intercourse and was referred to PFPT. She notes always having pain with insertion.     Bladder: Comment:        Urination frequency 6-7x a day    Urgency:  Can hold if she needs to, will sometimes have cramping since her IUD   Incontinence Denies    Pads denies   Nocturia denies   Pain 1x a night    Emptying Does not need to strain, feels like she fully   Prolapse symptoms  Denies    Liquid consumption Drinks about 3-4 refills of 24 oz water bottle, drinks about 3 cups of coffee, pre workout if she is going to the gym   UTI history When she was first sexually active she had 3 UTIs in a month, has not had a UTI recently          Bowel: Comment:        Frequency 2-3x a day, typically spread throughout the day, does not need to strain   Urge Strong normal urge, can make it    Incontinence denies   Emptying Feels fully empty when she finishes   [place] stool Type 3-4   Fiber Will take fiber, green powder, drink more water and then will take laxatives. Will start taking supplements if she misses a day    Does meal prep, oatmeal or breakfast sandwich or eggs/veggies, potatoes or rice with chicken or turkey for lunch, dinner protein starch and veggie, does a lot protein, will snack on rice cakes and fruit or protein bars and yogurt   Management Fiber, green powder, water and laxatives   Pain Had a hemorrhoid once about a few years ago, denies pain with BMs         OBGYN Comment:        Pregnancies 0   Births 0   Birth type n/a   Tears/ episiotomies n/a   Surgery No surgeries   Menstruation IUD has been lighter flow, consistent and regular, lots of cramping but has been less since her IUD. Prior heavier flow, was getting a lot of cramping                    Sexual Activity Comment:        Type Penis vaginal    Pain Initial insertion  "will be pain, lasts about 2 minutes, feels it reduces after \"muscle relax,\" will have burning/tearing sensation     Does not notice a difference in pain based on position    Tried lubrication once or twice, will have burning but unsure if it was from initial insertion or the lubrication     History of bleeding post intercourse     Will have discomfort for about 5 min    Always has had pain with intercourse     At worst: 7/10   Post intercourse discomfort: 3-4/10    Orgasm Yes    Masturbation Yes, denies pain   Libido Feels it is nonexistence, feels that it could be related to her pain with intercourse    Pain with speculum insertion          Pain Comment:        Low back pain Went for PT previously, has not been noticing back pain since                                Other Comment:        Physical activity  Go to the gym, will do lifting and cardio, walks outside/hiking, 3x a week typically   PLOF Previous history of dancing, did ballet and modern in college   Living environment Lives with her boyfriend, apartment    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 denies  Problems with stumbling or falling? Denies     Cauda equina questions:  Problems with bowel and bladder control? Specifically retention? See above  Pins and needles or numbness in the saddle area? Denies     Review of systems:  General - (chills, night sweats, recent infection, fever, weight loss/gain, unexplained night pain, excessive fatigue): Denies  Do you have a history of cancer? Denies  Gastrointestinal system - (abdominal pain, bowel changes, nausea, vomiting, bloating): Bloating that is inconsistent, might have it prior to her menstrual period   Cardiovascular system - (chest pain, palpitations, orthopnea, " other): Denies  Respiratory system - (cough, SOB, sputum production, other): Denies  Musculoskeletal system: osteoporosis, vertebral fracture? Denies  Endocrine - (polyuria, polydipsia, heat or cold intolerance, other): Denies  Neurological - (numbness/tingling, falling/stumbling, HA, dizziness, diplopia, dysphagia/dysarthria, double vision, tinnitus, memory, drop attacks, other): Denies  Any long-term steroid use? Denies      Patient goals stop having pain with sexual activity     OBJECTIVE FINDINGS:      ASSESSMENT   PELVIS/POSTURE            LUMBAR AROM    Flexion    Extension    rotation    Sidebending        HIP ROM    ER    IR    Flexion    Extension    Ankle ROM    Lower Extremity Strength    hip flexion    hip extension    Hip IR    Hip ER    Hip abduction    Hip adduction    Knee    Ankle            SPECIAL TESTS    Hamstring length    Hip FADIR    Hip SCOUR    Trendelenburg    Slump test    SLR test    BAM            LUMBAR PALPATION    HIP PALPATION    OTHER PALPATION        SI JOINT TESTING     S/L Compression test    Thigh thrust test    Prone sacral thrust test    Gaenslen test        ABDOMEN    palpation Muscle guarding present and TTP reported with diaphragm palpation and lower abdomen on L>R side   Lower rib angle 90 degrees   Breathing pattern Min to moderate diaphragm engagement with breathing   Diastasis    Skin/ integumentary        BALANCE    GAIT MECHANICS    RUNNING MECHANICS    SQUATTING MECHANICS    LUNGING MECHANICS    BED MOBILITY    SIT TO STAND      Verbal consent give for internal evaluation and treatment. Held today, plan to assess at future visit with pt consent      ASSESSMENT   EXTERNAL genital EXAM    PFM OBSERVATION    Skin integrity    contraction    relaxation    Bulge/pelvic drop    Cough    Light touch sensation    INTERNAL PFM exam Internal vaginal   Levator Ani Strength     Power    Endurance    Repetitions    Quick Flicks            Palpation            TAILBONE                     TREATMENT LOG:  verbal consent for internal intervention: Held today, plan to assess at future visit with pt consent     Diagnosis  Pelvic Floor Dysfunction    Precautions       PT INITIAL EVALUATION:   4/6/23     PT PROGRESS NOTE:        Y/N Treatment Details: Time:   MODALITIES  02010   0 mins   Heat       Ice       THER ACT          67011   10 mins   OUTCOME MEASURES Y PSFS: 40%    Falls Screen, Medication Review, VS, pain assessment Y      Pt Education:verbalized understanding of education and returned demonstration appropriately  Y Pelvic floor anatomy/ purpose function, POC, breathing pattern, abdominal tension influence on PFm, pain cycle of vagina     HEP / HEP Review Y  eval: quadruped diaphragmatic breathing, incorporating breathing throughout day     THER EX         45689   0 mins                                    NEURO RE-ED    98205   Billed with TA    Diaphragmatic breathing y In quadruped, x10                  MANUAL                 70954   0 mins   Joint Mobilization       Soft Tissue mobilizations External      Soft Tissue Mobilization Internal      IASTM

## 2023-04-06 NOTE — PROGRESS NOTES
Referring Provider: By co-signing this Plan of Care (POC) either electronically or physically you agree to the following:    I have reviewed the the Plan of Care established by the therapist within this document and certify that the services are skilled and medically necessary. I have reviewed the plan and recommend that these services continue to meet the goals stated in this document.       EXTERNAL PROVIDER FAXING BACK:    PHYSICIAN SIGNATURE: __________________________________     DATE: ___________________  TIME: _____________    IMPORTANT:  If returning this Plan of Care by fax, please fax back ONLY the signature page.   _________________________________________________________________________      [facility] Fax: [phone]        PT EVALUATION FOR OUTPATIENT THERAPY    Patient: Mini Hodges  MRN: [id number]  : 2000 22 y.o.   Referring Physician: Marbella Jackson CRNP  Date of Visit: 2023      Certification Dates:  23 through 23         Recommended Frequency & Duration:  1 time/week for up to 3 months     Diagnosis:   1. Pelvic floor dysfunction    2. Dyspareunia in female    3. Other specified disorders of muscle        Chief Complaints: No chief complaint on file.      Precautions: no known precautions/restrictions  Precautions additional comments:      Past Medical History: No past medical history on file.    Past Surgical History: No past surgical history on file.      LEARNING ASSESSMENT    Assessment completed:  Yes    Learner name:  Mini Hodges    Learner: Patient    Learning Barriers:  Learning barriers: No Barriers    Preferred Language: English     Needed: No    Education Provided:   Method: Discussion and Demonstration  Readiness: acceptance  Response: Demonstrated understanding and Verbalizes understanding      CO-LEARNER ASSESSMENT:    Completed: No          Welcome letter discussed: Yes Patient provided with Welcome Letter, which includes attendance  policy. Provided education regarding cancellation and no-show policy. Education regarding the importance of participation and regular attendance to maximize goal attainment.         OBJECTIVE MEASUREMENTS/DATA:    Time In Session:  Start Time: 0901  Stop Time: 1000  Time Calculation (min): 59 min   Assessment and Plan - 04/06/23 0859        Assessment    Plan of Care reviewed and patient/family in agreement No     System Pathology/Pathophysiology Noted musculoskeletal;neuromuscular;genitourinary     Problem List abnormal muscle tone;decreased endurance;decreased strength;decreased flexibility;impaired coordination;pain     Clinical Assessment Mini is a 23 y/o cis gender female presenting to PFPT with c/o pelvic floor dysfunction including dyspareunia and pain with pelvic exams. She appear to have muscle guarding present with TTP reported in diaphram and lower abdomen which could be contributing to her symptoms. Due to extensive subjective history, unable to complete objective assessment today, plan to continue gathering objective measures at subsequent visits. Pt educated on abdominal tension awareness and diaphragmatic breathing. She will benefit from continued skilled PT to reduce pain and improve function of PFm.     Plan and Recommendations 1x a week for 6-8 visits, biweekly 4-6 visits     Planned Services CPT 45569 Therapeutic exercises;CPT 78503 Therapeutic activities;CPT 07771 Self-care/Home management training;CPT 16431 Neuromuscular Reeducation;CPT 93688 Manual therapy;CPT 58887 Gait training                General Information - 04/06/23 0859        Session Details    Document Type initial evaluation     Mode of Treatment individual therapy        General Information    Referring Physician ABENA Talbert     History of present illness/functional impairment Mini is a 23 y/o cis gender female referred to PFPT by obgyn with c/o dyspareunia and pain with pelvic exams.     Existing  Precautions/Restrictions no known precautions/restrictions                Pain/Vitals - 04/06/23 0859        Pain Assessment    Currently in pain No/Denies        Pre Activity Vital Signs    /80     BP Location Left upper arm     BP Method Manual     Patient Position Sitting                Falls/Food Screening - 04/06/23 0859        Initial Falls Assessment    One or more falls in the last year No                PT - 04/06/23 0859        Physical Therapy    Physical Therapy Specialty Pelvic Floor Program: Age 14+        PT Plan    Frequency of treatment 1 time/week     PT Duration 3 months     PT Cert From 04/06/23     PT Cert To 07/05/23     Date PT POC was sent to provider 04/06/23     Signed PT Plan of Care received?  No                   Outcome Measures    PT Outcome Measures - 04/06/23 0859        NEW (2/6/23):  PSFS     PSFS ACTIVITY 1 sexual activity     PSFS ANSWER 1 3     PSFS ACTIVITY 2 Gynecology exams     PSFS ANSWER 2 5     PSFS Sum of Activity Scores 8     PSFS Number of Activities 2     PSFS Percent Score 40 %                  Goals     •  Mutually agreed upon pain goal       Mutually agreed upon pain goal: 0/10      •  pelvic goals (pt-stated)       In 8 weeks Mini will:     Be (I) with pelvic floor contraction/ relaxation/ bearing down in order to improve normal bowel and bladder function.     (I) with diaphragmatic breathing for improved Pfm tissue mobility     be able to tolerate digital palpation of pelvic floor as well as digital insertion and removal with pain reduce by 30% in order to tolerate pelvic exams.     improve PSFS by 30% in order to improve QOL as well as pelvic floor function.     In 16 weeks Mini will:   (I) with HEP in order to maintain and aide in gains made with skilled PT services    (I) with toilet posture and bowel management in order to improve bowel function-     Increase fiber intake by 10-15 grams in order to improve bowel function.     be able to tolerate  digital palpation of pelvic floor as well as digital insertion and removal with pain reduce by 80% in order to tolerate pelvic exams.      improve PSFS by 80% in order to improve QOL as well as pelvic floor function.     be (I) with self abdominal myofascial massage to decrease abdominal pain and dysfunction.     Improve gluteal strength by 1/2 MMT GEO in order to decrease demand on pelvic floor and improve overall functional mobility.     Reduced TTP and fascial restrictions in abdomen by 50% in order to improve pelvic floor function and QOL.    Return to pelvic exams without signs/ symptoms of pelvic floor dysfunction.        •  stop having pain with sexual activity (pt-stated)               TREATMENT PLAN:    Mini is a 23 y/o cis gender female presenting to PFPT with c/o dyspareunia and pain with pelvic exams.  Mini went to the gynecologist about a few month ago, she has been having pain with intercourse and was referred to PFPT. She notes always having pain with insertion.     Bladder: Comment:        Urination frequency 6-7x a day    Urgency:  Can hold if she needs to, will sometimes have cramping since her IUD   Incontinence Denies    Pads denies   Nocturia denies   Pain 1x a night    Emptying Does not need to strain, feels like she fully   Prolapse symptoms  Denies    Liquid consumption Drinks about 3-4 refills of 24 oz water bottle, drinks about 3 cups of coffee, pre workout if she is going to the gym   UTI history When she was first sexually active she had 3 UTIs in a month, has not had a UTI recently          Bowel: Comment:        Frequency 2-3x a day, typically spread throughout the day, does not need to strain   Urge Strong normal urge, can make it    Incontinence denies   Emptying Feels fully empty when she finishes   [place] stool Type 3-4   Fiber Will take fiber, green powder, drink more water and then will take laxatives. Will start taking supplements if she misses a day    Does meal prep,  "oatmeal or breakfast sandwich or eggs/veggies, potatoes or rice with chicken or turkey for lunch, dinner protein starch and veggie, does a lot protein, will snack on rice cakes and fruit or protein bars and yogurt   Management Fiber, green powder, water and laxatives   Pain Had a hemorrhoid once about a few years ago, denies pain with BMs         OBGYN Comment:        Pregnancies 0   Births 0   Birth type n/a   Tears/ episiotomies n/a   Surgery No surgeries   Menstruation IUD has been lighter flow, consistent and regular, lots of cramping but has been less since her IUD. Prior heavier flow, was getting a lot of cramping                    Sexual Activity Comment:        Type Penis vaginal    Pain Initial insertion will be pain, lasts about 2 minutes, feels it reduces after \"muscle relax,\" will have burning/tearing sensation     Does not notice a difference in pain based on position    Tried lubrication once or twice, will have burning but unsure if it was from initial insertion or the lubrication     History of bleeding post intercourse     Will have discomfort for about 5 min    Always has had pain with intercourse     At worst: 7/10   Post intercourse discomfort: 3-4/10    Orgasm Yes    Masturbation Yes, denies pain   Libido Feels it is nonexistence, feels that it could be related to her pain with intercourse    Pain with speculum insertion          Pain Comment:        Low back pain Went for PT previously, has not been noticing back pain since                                Other Comment:        Physical activity  Go to the gym, will do lifting and cardio, walks outside/hiking, 3x a week typically   PLOF Previous history of dancing, did ballet and modern in college   Living environment Lives with her boyfriend, apartment    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 denies  Problems with stumbling or falling? Denies     Cauda equina questions:  Problems with bowel and bladder control? Specifically retention? See above  Pins and needles or numbness in the saddle area? Denies     Review of systems:  General - (chills, night sweats, recent infection, fever, weight loss/gain, unexplained night pain, excessive fatigue): Denies  Do you have a history of cancer? Denies  Gastrointestinal system - (abdominal pain, bowel changes, nausea, vomiting, bloating): Bloating that is inconsistent, might have it prior to her menstrual period   Cardiovascular system - (chest pain, palpitations, orthopnea, other): Denies  Respiratory system - (cough, SOB, sputum production, other): Denies  Musculoskeletal system: osteoporosis, vertebral fracture? Denies  Endocrine - (polyuria, polydipsia, heat or cold intolerance, other): Denies  Neurological - (numbness/tingling, falling/stumbling, HA, dizziness, diplopia, dysphagia/dysarthria, double vision, tinnitus, memory, drop attacks, other): Denies  Any long-term steroid use? Denies      Patient goals stop having pain with sexual activity     OBJECTIVE FINDINGS:      ASSESSMENT   PELVIS/POSTURE            LUMBAR AROM    Flexion    Extension    rotation    Sidebending        HIP ROM    ER    IR    Flexion    Extension    Ankle ROM    Lower Extremity Strength    hip flexion    hip extension    Hip IR    Hip ER    Hip abduction    Hip adduction    Knee    Ankle            SPECIAL TESTS    Hamstring length    Hip FADIR    Hip SCOUR    Trendelenburg    Slump test    SLR test    BAM            LUMBAR PALPATION    HIP PALPATION    OTHER PALPATION        SI JOINT TESTING     S/L Compression test    Thigh thrust test    Prone sacral thrust test    Gaenslen test        ABDOMEN    palpation Muscle guarding present and TTP reported with diaphragm palpation and lower abdomen on  L>R side   Lower rib angle 90 degrees   Breathing pattern Min to moderate diaphragm engagement with breathing   Diastasis    Skin/ integumentary        BALANCE    GAIT MECHANICS    RUNNING MECHANICS    SQUATTING MECHANICS    LUNGING MECHANICS    BED MOBILITY    SIT TO STAND      Verbal consent give for internal evaluation and treatment. Held today, plan to assess at future visit with pt consent      ASSESSMENT   EXTERNAL genital EXAM    PFM OBSERVATION    Skin integrity    contraction    relaxation    Bulge/pelvic drop    Cough    Light touch sensation    INTERNAL PFM exam Internal vaginal   Levator Ani Strength     Power    Endurance    Repetitions    Quick Flicks            Palpation            TAILBONE                    TREATMENT LOG:  verbal consent for internal intervention: Held today, plan to assess at future visit with pt consent     Diagnosis  Pelvic Floor Dysfunction    Precautions       PT INITIAL EVALUATION:   4/6/23     PT PROGRESS NOTE:        Y/N Treatment Details: Time:   MODALITIES  04434   0 mins   Heat       Ice       THER ACT          23493   10 mins   OUTCOME MEASURES Y PSFS: 40%    Falls Screen, Medication Review, VS, pain assessment Y      Pt Education:verbalized understanding of education and returned demonstration appropriately  Y Pelvic floor anatomy/ purpose function, POC, breathing pattern, abdominal tension influence on PFm, pain cycle of vagina     HEP / HEP Review Y  eval: quadruped diaphragmatic breathing, incorporating breathing throughout day     THER EX         86925   0 mins                                    NEURO RE-ED    88182   Billed with TA    Diaphragmatic breathing y In quadruped, x10                  MANUAL                 67419   0 mins   Joint Mobilization       Soft Tissue mobilizations External      Soft Tissue Mobilization Internal      IASTM                          ASSESSMENT:    This 22 y.o. year old female presents to PT with above stated diagnosis. Physical  Therapy evaluation reveals abnormal muscle tone, decreased endurance, decreased strength, decreased flexibility, impaired coordination, pain resulting in   limitations. Mini Hodges will benefit from skilled PT services to address limitation, work towards rehab and patient goals and maximize PLOF of chosen ADLs.     Planned Services: The patient's treatment will include CPT 75063 Therapeutic exercises, CPT 36766 Therapeutic activities, CPT 61401 Self-care/Home management training, CPT 28135 Neuromuscular Reeducation, CPT 32468 Manual therapy, CPT 67362 Gait training, .

## 2023-04-13 ENCOUNTER — HOSPITAL ENCOUNTER (OUTPATIENT)
Dept: PHYSICAL THERAPY | Age: 23
Setting detail: THERAPIES SERIES
Discharge: HOME | End: 2023-04-13
Attending: NURSE PRACTITIONER
Payer: COMMERCIAL

## 2023-04-13 DIAGNOSIS — M62.89 PELVIC FLOOR DYSFUNCTION: Primary | ICD-10-CM

## 2023-04-13 PROCEDURE — 97530 THERAPEUTIC ACTIVITIES: CPT | Mod: GP

## 2023-04-13 PROCEDURE — 97140 MANUAL THERAPY 1/> REGIONS: CPT | Mod: GP

## 2023-04-13 NOTE — OP PT TREATMENT LOG
Mini is a 21 y/o cis gender female presenting to PFPT with c/o dyspareunia and pain with pelvic exams.  Mini went to the gynecologist about a few month ago, she has been having pain with intercourse and was referred to PFPT. She notes always having pain with insertion.     Bladder: Comment:        Urination frequency 6-7x a day    Urgency:  Can hold if she needs to, will sometimes have cramping since her IUD   Incontinence Denies    Pads denies   Nocturia denies   Pain 1x a night    Emptying Does not need to strain, feels like she fully   Prolapse symptoms  Denies    Liquid consumption Drinks about 3-4 refills of 24 oz water bottle, drinks about 3 cups of coffee, pre workout if she is going to the gym   UTI history When she was first sexually active she had 3 UTIs in a month, has not had a UTI recently          Bowel: Comment:        Frequency 2-3x a day, typically spread throughout the day, does not need to strain   Urge Strong normal urge, can make it    Incontinence denies   Emptying Feels fully empty when she finishes   [place] stool Type 3-4   Fiber Will take fiber, green powder, drink more water and then will take laxatives. Will start taking supplements if she misses a day    Does meal prep, oatmeal or breakfast sandwich or eggs/veggies, potatoes or rice with chicken or turkey for lunch, dinner protein starch and veggie, does a lot protein, will snack on rice cakes and fruit or protein bars and yogurt   Management Fiber, green powder, water and laxatives   Pain Had a hemorrhoid once about a few years ago, denies pain with BMs         OBGYN Comment:        Pregnancies 0   Births 0   Birth type n/a   Tears/ episiotomies n/a   Surgery No surgeries   Menstruation IUD has been lighter flow, consistent and regular, lots of cramping but has been less since her IUD. Prior heavier flow, was getting a lot of cramping                    Sexual Activity Comment:        Type Penis vaginal    Pain Initial insertion  "will be pain, lasts about 2 minutes, feels it reduces after \"muscle relax,\" will have burning/tearing sensation     Does not notice a difference in pain based on position    Tried lubrication once or twice, will have burning but unsure if it was from initial insertion or the lubrication     History of bleeding post intercourse     Will have discomfort for about 5 min    Always has had pain with intercourse     At worst: 7/10   Post intercourse discomfort: 3-4/10    Orgasm Yes    Masturbation Yes, denies pain   Libido Feels it is nonexistence, feels that it could be related to her pain with intercourse    Pain with speculum insertion          Pain Comment:        Low back pain Went for PT previously, has not been noticing back pain since                                Other Comment:        Physical activity  Go to the gym, will do lifting and cardio, walks outside/hiking, 3x a week typically   PLOF Previous history of dancing, did ballet and modern in college   Living environment Lives with her boyfriend, apartment    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 denies  Problems with stumbling or falling? Denies     Cauda equina questions:  Problems with bowel and bladder control? Specifically retention? See above  Pins and needles or numbness in the saddle area? Denies     Review of systems:  General - (chills, night sweats, recent infection, fever, weight loss/gain, unexplained night pain, excessive fatigue): Denies  Do you have a history of cancer? Denies  Gastrointestinal system - (abdominal pain, bowel changes, nausea, vomiting, bloating): Bloating that is inconsistent, might have it prior to her menstrual period   Cardiovascular system - (chest pain, palpitations, orthopnea, " other): Denies  Respiratory system - (cough, SOB, sputum production, other): Denies  Musculoskeletal system: osteoporosis, vertebral fracture? Denies  Endocrine - (polyuria, polydipsia, heat or cold intolerance, other): Denies  Neurological - (numbness/tingling, falling/stumbling, HA, dizziness, diplopia, dysphagia/dysarthria, double vision, tinnitus, memory, drop attacks, other): Denies  Any long-term steroid use? Denies      Patient goals stop having pain with sexual activity     OBJECTIVE FINDINGS:      ASSESSMENT   PELVIS/POSTURE            LUMBAR AROM    Flexion    Extension    rotation    Sidebending        HIP ROM    ER    IR    Flexion    Extension    Ankle ROM    Lower Extremity Strength    hip flexion    hip extension    Hip IR    Hip ER    Hip abduction    Hip adduction    Knee    Ankle            SPECIAL TESTS    Hamstring length    Hip FADIR    Hip SCOUR    Trendelenburg    Slump test    SLR test    BAM            LUMBAR PALPATION    HIP PALPATION    OTHER PALPATION        SI JOINT TESTING     S/L Compression test    Thigh thrust test    Prone sacral thrust test    Gaenslen test        ABDOMEN    palpation Muscle guarding present and TTP reported with diaphragm palpation and lower abdomen on L>R side   Lower rib angle 90 degrees   Breathing pattern Min to moderate diaphragm engagement with breathing   Diastasis    Skin/ integumentary        BALANCE    GAIT MECHANICS    RUNNING MECHANICS    SQUATTING MECHANICS    LUNGING MECHANICS    BED MOBILITY    SIT TO STAND      Verbal consent give for internal evaluation and treatment. Pt provides verbal consent for internal assessment     ASSESSMENT   EXTERNAL genital EXAM    PFM OBSERVATION    Skin integrity    contraction    relaxation    Bulge/pelvic drop    Cough    Light touch sensation    INTERNAL PFM exam Internal vaginal   Levator Ani Strength  4/5   Power    Endurance    Repetitions    Quick Flicks    Coordination Demonstrates appropriate lengthening  and contraction        Palpation TTP with insertion, muscle guarding and TTP in bilat BC, STP and IC on L>R. Muscle guarding present in bilat BC, STP, and IC            TAILBONE                    TREATMENT LOG:  verbal consent for internal intervention: Pt provides verbal consent for internal assessment    Diagnosis  Pelvic Floor Dysfunction    Precautions       PT INITIAL EVALUATION:   4/6/23     PT PROGRESS NOTE:        Y/N Treatment Details: Time:   MODALITIES  25207   0 mins   Heat       Ice       THER ACT          46239   40 mins   OUTCOME MEASURES  PSFS: 40%    Falls Screen, Medication Review, VS, pain assessment Y      Pt Education:verbalized understanding of education and returned demonstration appropriately  Y Pelvic floor anatomy/ purpose function, POC, breathing pattern, abdominal tension influence on PFm, pain cycle of vagina   4/13: reviewed pain cycle of vagina, discussed findings on internal assessment, pelvic stability, intimate mabel dilators    HEP / HEP Review Y  eval: quadruped diaphragmatic breathing, incorporating breathing throughout day   4/13: continue breathing, dilators     THER EX         14536   0 mins                                    NEURO RE-ED    46690   0 min    Diaphragmatic breathing n In quadruped, x10                 MANUAL                 40548   15 mins   Joint Mobilization       Soft Tissue mobilizations External      Soft Tissue Mobilization Internal y Release to bilat STP, BC and IC    IASTM

## 2023-04-13 NOTE — PROGRESS NOTES
PT DAILY NOTE FOR OUTPATIENT THERAPY    Patient: Mini Hodges MRN: [id number]  : 2000 22 y.o.  Referring Physician: Marbella Jackson CRNP  Date of Visit: 2023    Certification Dates: 23 through 23    Diagnosis:   1. Pelvic floor dysfunction        Chief Complaints:       Precautions:   Existing Precautions/Restrictions: no known precautions/restrictions      TODAY'S VISIT    Time In Session:  Start Time: 1205  Stop Time: 1300  Time Calculation (min): 55 min   History/Vitals/Pain/Encounter Info - 23 1204        Injury History/Precautions/Daily Required Info    Document Type daily treatment     Primary Therapist Andree     Referring Physician ABENA Talbert     Existing Precautions/Restrictions no known precautions/restrictions     History of present illness/functional impairment Mini is a 23 y/o cis gender female referred to PFPT by obgyn with c/o dyspareunia and pain with pelvic exams.     Patient/Family/Caregiver Comments/Observations Mini reports she has been more aware of her abdomen and taking deep breaths throughout her day.     Patient reported fall since last visit No        Pain Assessment    Currently in pain No/Denies        Pre Activity Vital Signs    /78     BP Location Left upper arm     BP Method Manual     Patient Position Sitting                Daily Treatment Assessment and Plan - 23 1204        Daily Treatment Assessment and Plan    Progress toward goals Progressing     Daily Outcome Summary Completed internal PFm assessment from superficial to middle layer today with pt consent. She reports TTP with insertion and in bilat BC, IC and STP, muscle guarding present throughout. Tension appears to release with prolonged pressure and diaphragmatic breathing. Pt educated on dilator options and benefit of purchased set for progression towards goals. She will benefit from continued skilled PT to reduce pain and improve function of PFm.     Plan and  Urgent Care Visit    Assessment & Plan   1. Seasonal allergic rhinitis due to other allergic trigger  No concern for ear infection or sinus infection. Recommended increasing treatment for allergies. Start zyrtec at night, continue daily qnasal spray daily, stop sudafed. I did place ENT referral since she used to see them and this has been going on for months.   - Adult ENT  Referral; Future  - olopatadine (PATADAY) 0.2 % ophthalmic solution; Place 1 drop into both eyes daily  Dispense: 2.5 mL; Refill: 1  - cetirizine (ZYRTEC) 10 MG tablet; Take 1 tablet (10 mg) by mouth daily  Dispense: 90 tablet; Refill: 0       Options for treatment and follow-up care were reviewed with the patient who was engaged and actively involved in the decision making process, verbalized understanding of the options discussed, and satisfied with the final plan.      Lizett Hickman MD    Subjective   Maryse Goins is a 62 year old female who presents for bilateral ear pain.    She has a history of chronic post-nasal drip. In April, developed more post-nasal drip. She would consistently take a decongestant which used to take care of the issue, but not helping now. She does feel congested and ears clogged now which isn't improved with decongestants. She also has a little bit of runny nose.     She doesn't have issues with her hearing. No drainage. No fevers. No headaches. No excessive cough (just in the morning).    No history of chronic sinus infections. She has not had Covid19. She is fully boosted.    She has been taking sudafed once a day. She hasn't taken an antihistamine due to sedation. She has tried Claritin and zyrtec. She does do Qnasal (had response to preservatives in flonase). She previously saw an ENT and used to do nasal rinses.       Patient Active Problem List    Diagnosis Date Noted     Osteoarthritis of right hip, unspecified osteoarthritis type 09/13/2019     Priority: Medium     Post-nasal drip  2017     Priority: Medium     Flying phobia 2017     Priority: Medium     Patient is followed by YVETTE MAYA for ongoing prescription of benzodiazepines.  All refills should be approved by this provider, or covering partner.    Medication(s): Ativan.   Maximum quantity per month:   Clinic visit frequency required:      Controlled substance agreement on file: No  Benzodiazepine use reviewed by psychiatry:      Last [facility] website verification:  done on 2018   [email or web address]           Benign essential hypertension 2017     Priority: Medium     wants to think it over   fup in 4 weeks for recheck- either nurse only or ROV for recheck       Family history of colon cancer 12/15/2015     Priority: Medium     Overview:   Mother dx 49 ( age 52), maternal aunt x2 and maternal uncle x1 also   with colon CA.  Q2Y screening recommended.       Familial polyposis of colon 2011     Priority: Medium     Overview:    Possible HNPCC. Get colonoscopy every 2 years. Due in 2016       Malignant neoplasm of upper-outer quadrant of left breast in female, estrogen receptor positive (H) 2006     Priority: Medium     left breast cancer, infiltrating ductal cancer, grade 3, ER/WI positive, HER-2 negative, 8/14 positive nodes, modified left radical mastectomy 2006, status post dose-dense Adriamycin and cyclophosphamide followed by Taxol after mastectomy, finished 2006. Finished radiation 10/2006  Patient transferring care to Dr. Peck secondary to insurance issues. No new diagnoses.          Social: She reports that she quit smoking about 28 years ago. She smoked 0.00 packs per day for 0.00 years. She has never used smokeless tobacco. She reports current alcohol use. She reports that she does not use drugs.    There are no exam notes on file for this visit.    Objective     Vitals:    22 1129   BP: 118/66   BP Location: Right arm   Patient Position: Chair   Cuff Size:  Recommendations internal release, dilator training if able                     OBJECTIVE DATA TAKEN TODAY:    See treatment log    Today's Treatment:    Mnii is a 23 y/o cis gender female presenting to PFPT with c/o dyspareunia and pain with pelvic exams.  Mini went to the gynecologist about a few month ago, she has been having pain with intercourse and was referred to PFPT. She notes always having pain with insertion.     Bladder: Comment:        Urination frequency 6-7x a day    Urgency:  Can hold if she needs to, will sometimes have cramping since her IUD   Incontinence Denies    Pads denies   Nocturia denies   Pain 1x a night    Emptying Does not need to strain, feels like she fully   Prolapse symptoms  Denies    Liquid consumption Drinks about 3-4 refills of 24 oz water bottle, drinks about 3 cups of coffee, pre workout if she is going to the gym   UTI history When she was first sexually active she had 3 UTIs in a month, has not had a UTI recently          Bowel: Comment:        Frequency 2-3x a day, typically spread throughout the day, does not need to strain   Urge Strong normal urge, can make it    Incontinence denies   Emptying Feels fully empty when she finishes   [place] stool Type 3-4   Fiber Will take fiber, green powder, drink more water and then will take laxatives. Will start taking supplements if she misses a day    Does meal prep, oatmeal or breakfast sandwich or eggs/veggies, potatoes or rice with chicken or turkey for lunch, dinner protein starch and veggie, does a lot protein, will snack on rice cakes and fruit or protein bars and yogurt   Management Fiber, green powder, water and laxatives   Pain Had a hemorrhoid once about a few years ago, denies pain with BMs         OBGYN Comment:        Pregnancies 0   Births 0   Birth type n/a   Tears/ episiotomies n/a   Surgery No surgeries   Menstruation IUD has been lighter flow, consistent and regular, lots of cramping but has been less since her  "Adult Regular   Pulse: 78   Resp: 16   Temp: 99.1  F (37.3  C)   TempSrc: Oral   SpO2: 100%   Weight: 56.7 kg (125 lb)   Height: 1.549 m (5' 1\")     Body mass index is 23.62 kg/m .    GEN: NAD, healthy, alert  Constitutional: Awake, alert, cooperative, no acute distress, and appears stated age.  HEENT: NC/AT, EOMI, normal conjunctivae/sclerae, clear oropharynx, MMM  Eyes: sclera clear, conjunctiva normal.  ENT: NC/AT. TM clear bilaterally. Tonsils nonerythematous and without exudates or trismus. Nasal turbinates erythematous, clear drainage present  Lungs: No increased WOB.   Neurologic: A&Ox3.  Cranial nerves II-XII are grossly intact.  Sensory is intact and gait is normal.  Neuropsychiatric: Normal affect, mood, orientation, memory and insight.  Skin: No visible rashes, erythema, pallor, petechia or purpura.      " "IUD. Prior heavier flow, was getting a lot of cramping                    Sexual Activity Comment:        Type Penis vaginal    Pain Initial insertion will be pain, lasts about 2 minutes, feels it reduces after \"muscle relax,\" will have burning/tearing sensation     Does not notice a difference in pain based on position    Tried lubrication once or twice, will have burning but unsure if it was from initial insertion or the lubrication     History of bleeding post intercourse     Will have discomfort for about 5 min    Always has had pain with intercourse     At worst: 7/10   Post intercourse discomfort: 3-4/10    Orgasm Yes    Masturbation Yes, denies pain   Libido Feels it is nonexistence, feels that it could be related to her pain with intercourse    Pain with speculum insertion          Pain Comment:        Low back pain Went for PT previously, has not been noticing back pain since                                Other Comment:        Physical activity  Go to the gym, will do lifting and cardio, walks outside/hiking, 3x a week typically   PLOF Previous history of dancing, did ballet and modern in college   Living environment Lives with her boyfriend, apartment    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 denies  Problems with stumbling or falling? Denies     Cauda equina questions:  Problems with bowel and bladder control? Specifically retention? See above  Pins and needles or numbness in the saddle area? Denies     Review of systems:  General - (chills, night sweats, recent infection, fever, weight loss/gain, unexplained night pain, excessive fatigue): Denies  Do you have a history of cancer? Denies  Gastrointestinal system - (abdominal pain, bowel changes, nausea, vomiting, " bloating): Bloating that is inconsistent, might have it prior to her menstrual period   Cardiovascular system - (chest pain, palpitations, orthopnea, other): Denies  Respiratory system - (cough, SOB, sputum production, other): Denies  Musculoskeletal system: osteoporosis, vertebral fracture? Denies  Endocrine - (polyuria, polydipsia, heat or cold intolerance, other): Denies  Neurological - (numbness/tingling, falling/stumbling, HA, dizziness, diplopia, dysphagia/dysarthria, double vision, tinnitus, memory, drop attacks, other): Denies  Any long-term steroid use? Denies      Patient goals stop having pain with sexual activity     OBJECTIVE FINDINGS:      ASSESSMENT   PELVIS/POSTURE            LUMBAR AROM    Flexion    Extension    rotation    Sidebending        HIP ROM    ER    IR    Flexion    Extension    Ankle ROM    Lower Extremity Strength    hip flexion    hip extension    Hip IR    Hip ER    Hip abduction    Hip adduction    Knee    Ankle            SPECIAL TESTS    Hamstring length    Hip FADIR    Hip SCOUR    Trendelenburg    Slump test    SLR test    BAM            LUMBAR PALPATION    HIP PALPATION    OTHER PALPATION        SI JOINT TESTING     S/L Compression test    Thigh thrust test    Prone sacral thrust test    Gaenslen test        ABDOMEN    palpation Muscle guarding present and TTP reported with diaphragm palpation and lower abdomen on L>R side   Lower rib angle 90 degrees   Breathing pattern Min to moderate diaphragm engagement with breathing   Diastasis    Skin/ integumentary        BALANCE    GAIT MECHANICS    RUNNING MECHANICS    SQUATTING MECHANICS    LUNGING MECHANICS    BED MOBILITY    SIT TO STAND      Verbal consent give for internal evaluation and treatment. Pt provides verbal consent for internal assessment     ASSESSMENT   EXTERNAL genital EXAM    PFM OBSERVATION    Skin integrity    contraction    relaxation    Bulge/pelvic drop    Cough    Light touch sensation    INTERNAL PFM exam  Internal vaginal   Levator Ani Strength  4/5   Power    Endurance    Repetitions    Quick Flicks    Coordination Demonstrates appropriate lengthening and contraction        Palpation TTP with insertion, muscle guarding and TTP in bilat BC, STP and IC on L>R. Muscle guarding present in bilat BC, STP, and IC            TAILBONE                    TREATMENT LOG:  verbal consent for internal intervention: Pt provides verbal consent for internal assessment    Diagnosis  Pelvic Floor Dysfunction    Precautions       PT INITIAL EVALUATION:   4/6/23     PT PROGRESS NOTE:        Y/N Treatment Details: Time:   MODALITIES  67748   0 mins   Heat       Ice       THER ACT          [id number]   40 mins   OUTCOME MEASURES  PSFS: 40%    Falls Screen, Medication Review, VS, pain assessment Y      Pt Education:verbalized understanding of education and returned demonstration appropriately  Y Pelvic floor anatomy/ purpose function, POC, breathing pattern, abdominal tension influence on PFm, pain cycle of vagina   4/13: reviewed pain cycle of vagina, discussed findings on internal assessment, pelvic stability, intimate mable dilators    HEP / HEP Review Y  eval: quadruped diaphragmatic breathing, incorporating breathing throughout day   4/13: continue breathing, dilators     THER EX         [id number]   0 mins                                    NEURO RE-ED    [id number]   0 min    Diaphragmatic breathing n In quadruped, x10                 MANUAL                 [id number]   15 mins   Joint Mobilization       Soft Tissue mobilizations External      Soft Tissue Mobilization Internal y Release to bilat STP, BC and IC    IASTM

## 2023-05-04 ENCOUNTER — HOSPITAL ENCOUNTER (OUTPATIENT)
Dept: PHYSICAL THERAPY | Age: 23
Setting detail: THERAPIES SERIES
Discharge: HOME | End: 2023-05-04
Attending: NURSE PRACTITIONER
Payer: COMMERCIAL

## 2023-05-04 DIAGNOSIS — M62.89 PELVIC FLOOR DYSFUNCTION: Primary | ICD-10-CM

## 2023-05-04 PROCEDURE — 97112 NEUROMUSCULAR REEDUCATION: CPT | Mod: GP

## 2023-05-04 PROCEDURE — 97140 MANUAL THERAPY 1/> REGIONS: CPT | Mod: GP

## 2023-05-04 PROCEDURE — 97530 THERAPEUTIC ACTIVITIES: CPT | Mod: GP

## 2023-05-04 NOTE — OP PT TREATMENT LOG
Mini is a 21 y/o cis gender female presenting to PFPT with c/o dyspareunia and pain with pelvic exams.  Mini went to the gynecologist about a few month ago, she has been having pain with intercourse and was referred to PFPT. She notes always having pain with insertion.     Bladder: Comment:        Urination frequency 6-7x a day    Urgency:  Can hold if she needs to, will sometimes have cramping since her IUD   Incontinence Denies    Pads denies   Nocturia denies   Pain 1x a night    Emptying Does not need to strain, feels like she fully   Prolapse symptoms  Denies    Liquid consumption Drinks about 3-4 refills of 24 oz water bottle, drinks about 3 cups of coffee, pre workout if she is going to the gym   UTI history When she was first sexually active she had 3 UTIs in a month, has not had a UTI recently          Bowel: Comment:        Frequency 2-3x a day, typically spread throughout the day, does not need to strain   Urge Strong normal urge, can make it    Incontinence denies   Emptying Feels fully empty when she finishes   [place] stool Type 3-4   Fiber Will take fiber, green powder, drink more water and then will take laxatives. Will start taking supplements if she misses a day    Does meal prep, oatmeal or breakfast sandwich or eggs/veggies, potatoes or rice with chicken or turkey for lunch, dinner protein starch and veggie, does a lot protein, will snack on rice cakes and fruit or protein bars and yogurt   Management Fiber, green powder, water and laxatives   Pain Had a hemorrhoid once about a few years ago, denies pain with BMs         OBGYN Comment:        Pregnancies 0   Births 0   Birth type n/a   Tears/ episiotomies n/a   Surgery No surgeries   Menstruation IUD has been lighter flow, consistent and regular, lots of cramping but has been less since her IUD. Prior heavier flow, was getting a lot of cramping                    Sexual Activity Comment:        Type Penis vaginal    Pain Initial insertion  "will be pain, lasts about 2 minutes, feels it reduces after \"muscle relax,\" will have burning/tearing sensation     Does not notice a difference in pain based on position    Tried lubrication once or twice, will have burning but unsure if it was from initial insertion or the lubrication     History of bleeding post intercourse     Will have discomfort for about 5 min    Always has had pain with intercourse     At worst: 7/10   Post intercourse discomfort: 3-4/10    Orgasm Yes    Masturbation Yes, denies pain   Libido Feels it is nonexistence, feels that it could be related to her pain with intercourse    Pain with speculum insertion          Pain Comment:        Low back pain Went for PT previously, has not been noticing back pain since                                Other Comment:        Physical activity  Go to the gym, will do lifting and cardio, walks outside/hiking, 3x a week typically   PLOF Previous history of dancing, did ballet and modern in college   Living environment Lives with her boyfriend, apartment    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 denies  Problems with stumbling or falling? Denies     Cauda equina questions:  Problems with bowel and bladder control? Specifically retention? See above  Pins and needles or numbness in the saddle area? Denies     Review of systems:  General - (chills, night sweats, recent infection, fever, weight loss/gain, unexplained night pain, excessive fatigue): Denies  Do you have a history of cancer? Denies  Gastrointestinal system - (abdominal pain, bowel changes, nausea, vomiting, bloating): Bloating that is inconsistent, might have it prior to her menstrual period   Cardiovascular system - (chest pain, palpitations, orthopnea, " other): Denies  Respiratory system - (cough, SOB, sputum production, other): Denies  Musculoskeletal system: osteoporosis, vertebral fracture? Denies  Endocrine - (polyuria, polydipsia, heat or cold intolerance, other): Denies  Neurological - (numbness/tingling, falling/stumbling, HA, dizziness, diplopia, dysphagia/dysarthria, double vision, tinnitus, memory, drop attacks, other): Denies  Any long-term steroid use? Denies      Patient goals stop having pain with sexual activity     OBJECTIVE FINDINGS:      ASSESSMENT   PELVIS/POSTURE            LUMBAR AROM    Flexion    Extension    rotation    Sidebending        HIP ROM    ER    IR    Flexion    Extension    Ankle ROM    Lower Extremity Strength    hip flexion    hip extension    Hip IR    Hip ER    Hip abduction    Hip adduction    Knee    Ankle            SPECIAL TESTS    Hamstring length    Hip FADIR    Hip SCOUR    Trendelenburg    Slump test    SLR test    BAM            LUMBAR PALPATION    HIP PALPATION    OTHER PALPATION        SI JOINT TESTING     S/L Compression test    Thigh thrust test    Prone sacral thrust test    Gaenslen test        ABDOMEN    palpation Muscle guarding present and TTP reported with diaphragm palpation and lower abdomen on L>R side   Lower rib angle 90 degrees   Breathing pattern Min to moderate diaphragm engagement with breathing   Diastasis    Skin/ integumentary        BALANCE    GAIT MECHANICS    RUNNING MECHANICS    SQUATTING MECHANICS    LUNGING MECHANICS    BED MOBILITY    SIT TO STAND      Verbal consent give for internal evaluation and treatment. Pt provides verbal consent for internal assessment     ASSESSMENT   EXTERNAL genital EXAM    PFM OBSERVATION    Skin integrity    contraction    relaxation    Bulge/pelvic drop    Cough    Light touch sensation    INTERNAL PFM exam Internal vaginal   Levator Ani Strength  4/5   Power    Endurance    Repetitions    Quick Flicks    Coordination Demonstrates appropriate lengthening  and contraction        Palpation TTP with insertion, muscle guarding and TTP in bilat BC, STP and IC on L>R. Muscle guarding present in bilat BC, STP, and IC            TAILBONE                    TREATMENT LOG:  verbal consent for internal intervention: Pt provides verbal consent for internal assessment    Diagnosis  Pelvic Floor Dysfunction    Precautions       PT INITIAL EVALUATION:   4/6/23     PT PROGRESS NOTE:        Y/N Treatment Details: Time:   MODALITIES  98542   0 mins   Heat       Ice       THER ACT          77759   10 mins   OUTCOME MEASURES  PSFS: 40%    Falls Screen, Medication Review, VS, pain assessment Y      Pt Education:verbalized understanding of education and returned demonstration appropriately  Y Pelvic floor anatomy/ purpose function, POC, breathing pattern, abdominal tension influence on PFm, pain cycle of vagina   4/13: reviewed pain cycle of vagina, discussed findings on internal assessment, pelvic stability, intimate mabel dilators  5/4: meditation/mindfullness for nervous system down regulation     HEP / HEP Review Y  eval: quadruped diaphragmatic breathing, incorporating breathing throughout day   4/13: continue breathing, dilators   5/4: child's pose, happy baby, guided relaxation    THER EX         42790   0 mins                                    NEURO RE-ED    [id number]   15 min    Diaphragmatic breathing n In quadruped, x10                             Guided meditation  y Etelvina Conley Full Body Relaxation     MANUAL                 22415   28 mins   Joint Mobilization       Soft Tissue mobilizations External      Soft Tissue Mobilization Internal y Release to bilat STP, BC and IC    IASTM

## 2023-05-04 NOTE — PROGRESS NOTES
Physical Therapy Visit    PT DAILY NOTE FOR OUTPATIENT THERAPY    Patient: Mini Hodges MRN: [id number]  : 2000 22 y.o.  Referring Physician: System, Provider Not In  Date of Visit: 2023    Certification Dates: 23 through 23    Diagnosis:   1. Pelvic floor dysfunction        Chief Complaints:       Precautions:   Existing Precautions/Restrictions: no known precautions/restrictions      TODAY'S VISIT    Time In Session:  Start Time: 0807  Stop Time: 0900  Time Calculation (min): 53 min   History/Vitals/Pain/Encounter Info - 23 0809        Injury History/Precautions/Daily Required Info    Document Type daily treatment     Primary Therapist Andree     Referring Physician ABENA Talbert     Existing Precautions/Restrictions no known precautions/restrictions     History of present illness/functional impairment Mini is a 23 y/o cis gender female referred to PFPT by obgyn with c/o dyspareunia and pain with pelvic exams.     Patient/Family/Caregiver Comments/Observations Mini reports that she recently stopped getting scheduled at her one job and has been under more stress. She says she is currently on an antibiotic for a skin infection around her ear. She has noticed less pain with intercourse when her partner is entering from behind.     Patient reported fall since last visit No        Pain Assessment    Currently in pain No/Denies        Pre Activity Vital Signs    /80     BP Location Left upper arm     BP Method Manual     Patient Position Sitting                Daily Treatment Assessment and Plan - 23 0809        Daily Treatment Assessment and Plan    Progress toward goals Progressing     Daily Outcome Summary Continued internal PFm release to bilat BC, IC, STP and DTP, muscle guarding present with 1-2/10 TTP reported. Tension appears to reduce with prolonged pressure and diaphragmatic breathing. Ended session with guided full body meditation to assist with nervous  system down regulation and secondary to recent job loss/increased stress. She will benefit from continued skilled PT to reduce pain and improve function of PFm     Plan and Recommendations progress note                     OBJECTIVE DATA TAKEN TODAY:    None taken    Today's Treatment:    Mini is a 21 y/o cis gender female presenting to PFPT with c/o dyspareunia and pain with pelvic exams.  Mini went to the gynecologist about a few month ago, she has been having pain with intercourse and was referred to PFPT. She notes always having pain with insertion.     Bladder: Comment:        Urination frequency 6-7x a day    Urgency:  Can hold if she needs to, will sometimes have cramping since her IUD   Incontinence Denies    Pads denies   Nocturia denies   Pain 1x a night    Emptying Does not need to strain, feels like she fully   Prolapse symptoms  Denies    Liquid consumption Drinks about 3-4 refills of 24 oz water bottle, drinks about 3 cups of coffee, pre workout if she is going to the gym   UTI history When she was first sexually active she had 3 UTIs in a month, has not had a UTI recently          Bowel: Comment:        Frequency 2-3x a day, typically spread throughout the day, does not need to strain   Urge Strong normal urge, can make it    Incontinence denies   Emptying Feels fully empty when she finishes   [place] stool Type 3-4   Fiber Will take fiber, green powder, drink more water and then will take laxatives. Will start taking supplements if she misses a day    Does meal prep, oatmeal or breakfast sandwich or eggs/veggies, potatoes or rice with chicken or turkey for lunch, dinner protein starch and veggie, does a lot protein, will snack on rice cakes and fruit or protein bars and yogurt   Management Fiber, green powder, water and laxatives   Pain Had a hemorrhoid once about a few years ago, denies pain with BMs         OBGYN Comment:        Pregnancies 0   Births 0   Birth type n/a   Tears/ episiotomies  "n/a   Surgery No surgeries   Menstruation IUD has been lighter flow, consistent and regular, lots of cramping but has been less since her IUD. Prior heavier flow, was getting a lot of cramping                    Sexual Activity Comment:        Type Penis vaginal    Pain Initial insertion will be pain, lasts about 2 minutes, feels it reduces after \"muscle relax,\" will have burning/tearing sensation     Does not notice a difference in pain based on position    Tried lubrication once or twice, will have burning but unsure if it was from initial insertion or the lubrication     History of bleeding post intercourse     Will have discomfort for about 5 min    Always has had pain with intercourse     At worst: 7/10   Post intercourse discomfort: 3-4/10    Orgasm Yes    Masturbation Yes, denies pain   Libido Feels it is nonexistence, feels that it could be related to her pain with intercourse    Pain with speculum insertion          Pain Comment:        Low back pain Went for PT previously, has not been noticing back pain since                                Other Comment:        Physical activity  Go to the gym, will do lifting and cardio, walks outside/hiking, 3x a week typically   PLOF Previous history of dancing, did ballet and modern in college   Living environment Lives with her boyfriend, apartment    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 denies  Problems with stumbling or falling? Denies     Cauda equina questions:  Problems with bowel and bladder control? Specifically retention? See above  Pins and needles or numbness in the saddle area? Denies     Review of systems:  General - (chills, night sweats, recent infection, fever, weight loss/gain, unexplained night pain, " excessive fatigue): Denies  Do you have a history of cancer? Denies  Gastrointestinal system - (abdominal pain, bowel changes, nausea, vomiting, bloating): Bloating that is inconsistent, might have it prior to her menstrual period   Cardiovascular system - (chest pain, palpitations, orthopnea, other): Denies  Respiratory system - (cough, SOB, sputum production, other): Denies  Musculoskeletal system: osteoporosis, vertebral fracture? Denies  Endocrine - (polyuria, polydipsia, heat or cold intolerance, other): Denies  Neurological - (numbness/tingling, falling/stumbling, HA, dizziness, diplopia, dysphagia/dysarthria, double vision, tinnitus, memory, drop attacks, other): Denies  Any long-term steroid use? Denies      Patient goals stop having pain with sexual activity     OBJECTIVE FINDINGS:      ASSESSMENT   PELVIS/POSTURE            LUMBAR AROM    Flexion    Extension    rotation    Sidebending        HIP ROM    ER    IR    Flexion    Extension    Ankle ROM    Lower Extremity Strength    hip flexion    hip extension    Hip IR    Hip ER    Hip abduction    Hip adduction    Knee    Ankle            SPECIAL TESTS    Hamstring length    Hip FADIR    Hip SCOUR    Trendelenburg    Slump test    SLR test    BAM            LUMBAR PALPATION    HIP PALPATION    OTHER PALPATION        SI JOINT TESTING     S/L Compression test    Thigh thrust test    Prone sacral thrust test    Gaenslen test        ABDOMEN    palpation Muscle guarding present and TTP reported with diaphragm palpation and lower abdomen on L>R side   Lower rib angle 90 degrees   Breathing pattern Min to moderate diaphragm engagement with breathing   Diastasis    Skin/ integumentary        BALANCE    GAIT MECHANICS    RUNNING MECHANICS    SQUATTING MECHANICS    LUNGING MECHANICS    BED MOBILITY    SIT TO STAND      Verbal consent give for internal evaluation and treatment. Pt provides verbal consent for internal assessment     ASSESSMENT   EXTERNAL genital EXAM     PFM OBSERVATION    Skin integrity    contraction    relaxation    Bulge/pelvic drop    Cough    Light touch sensation    INTERNAL PFM exam Internal vaginal   Levator Ani Strength  4/5   Power    Endurance    Repetitions    Quick Flicks    Coordination Demonstrates appropriate lengthening and contraction        Palpation TTP with insertion, muscle guarding and TTP in bilat BC, STP and IC on L>R. Muscle guarding present in bilat BC, STP, and IC            TAILBONE                    TREATMENT LOG:  verbal consent for internal intervention: Pt provides verbal consent for internal assessment    Diagnosis  Pelvic Floor Dysfunction    Precautions       PT INITIAL EVALUATION:   4/6/23     PT PROGRESS NOTE:        Y/N Treatment Details: Time:   MODALITIES  62350   0 mins   Heat       Ice       THER ACT          90436   10 mins   OUTCOME MEASURES  PSFS: 40%    Falls Screen, Medication Review, VS, pain assessment Y      Pt Education:verbalized understanding of education and returned demonstration appropriately  Y Pelvic floor anatomy/ purpose function, POC, breathing pattern, abdominal tension influence on PFm, pain cycle of vagina   4/13: reviewed pain cycle of vagina, discussed findings on internal assessment, pelvic stability, intimate mabel dilators  5/4: meditation/mindfullness for nervous system down regulation     HEP / HEP Review Y  eval: quadruped diaphragmatic breathing, incorporating breathing throughout day   4/13: continue breathing, dilators   5/4: child's pose, happy baby, guided relaxation    THER EX         08598   0 mins                                    NEURO RE-ED    [id number]   15 min    Diaphragmatic breathing n In quadruped, x10                             Guided meditation  y Etelvina Conley Full Body Relaxation     MANUAL                 [id number]   28 mins   Joint Mobilization       Soft Tissue mobilizations External      Soft Tissue Mobilization Internal y Release to bilat STP, BC and IC    IASTM

## 2023-05-11 ENCOUNTER — HOSPITAL ENCOUNTER (OUTPATIENT)
Dept: PHYSICAL THERAPY | Age: 23
Setting detail: THERAPIES SERIES
Discharge: HOME | End: 2023-05-11
Attending: NURSE PRACTITIONER
Payer: COMMERCIAL

## 2023-05-11 DIAGNOSIS — M62.89 PELVIC FLOOR DYSFUNCTION: Primary | ICD-10-CM

## 2023-05-11 PROCEDURE — 97530 THERAPEUTIC ACTIVITIES: CPT | Mod: GP

## 2023-05-11 PROCEDURE — 97110 THERAPEUTIC EXERCISES: CPT | Mod: GP

## 2023-05-11 PROCEDURE — 97140 MANUAL THERAPY 1/> REGIONS: CPT | Mod: GP

## 2023-05-11 NOTE — OP PT TREATMENT LOG
Mini is a 23 y/o cis gender female presenting to PFPT with c/o dyspareunia and pain with pelvic exams.  Mini went to the gynecologist about a few month ago, she has been having pain with intercourse and was referred to PFPT. She notes always having pain with insertion.     Bladder: Comment:        Urination frequency 6-7x a day   5/11: feeling the same    Urgency:  Can hold if she needs to, will sometimes have cramping since her IUD   Incontinence Denies    Pads denies   Nocturia denies   Pain 1x a night    Emptying Does not need to strain, feels like she fully   Prolapse symptoms  Denies    Liquid consumption Drinks about 3-4 refills of 24 oz water bottle, drinks about 3 cups of coffee, pre workout if she is going to the gym   UTI history When she was first sexually active she had 3 UTIs in a month, has not had a UTI recently          Bowel: Comment:        Frequency 2-3x a day, typically spread throughout the day, does not need to strain  5/11: currently on her period so frequency is not her normal    Urge Strong normal urge, can make it    Incontinence denies   Emptying Feels fully empty when she finishes   [place] stool Type 3-4   Fiber Will take fiber, green powder, drink more water and then will take laxatives. Will start taking supplements if she misses a day    Does meal prep, oatmeal or breakfast sandwich or eggs/veggies, potatoes or rice with chicken or turkey for lunch, dinner protein starch and veggie, does a lot protein, will snack on rice cakes and fruit or protein bars and yogurt   Management Fiber, green powder, water and laxatives  5/11: she reports not needing laxatives recently, but has been feeling more bloated on her period    Pain Had a hemorrhoid once about a few years ago, denies pain with BMs         OBGYN Comment:        Pregnancies 0   Births 0   Birth type n/a   Tears/ episiotomies n/a   Surgery No surgeries   Menstruation IUD has been lighter flow, consistent and regular, lots of  "cramping but has been less since her IUD. Prior heavier flow, was getting a lot of cramping     5/11: currently on her period, flow is heavier this month                    Sexual Activity Comment:        Type Penis vaginal    Pain Initial insertion will be pain, lasts about 2 minutes, feels it reduces after \"muscle relax,\" will have burning/tearing sensation     Does not notice a difference in pain based on position    Tried lubrication once or twice, will have burning but unsure if it was from initial insertion or the lubrication     History of bleeding post intercourse     Will have discomfort for about 5 min    Always has had pain with intercourse     At worst: 7/10   Post intercourse discomfort: 3-4/10     5/11: Less pain with certain positions, but pain is still present. [place] continues to be most painful period. Pain averages 6/10. Less discomfort following intercourse    Orgasm Yes    Masturbation Yes, denies pain   Libido Feels it is nonexistence, feels that it could be related to her pain with intercourse    Pain with speculum insertion          Pain Comment:        Low back pain Went for PT previously, has not been noticing back pain since                                Other Comment:        Physical activity  Go to the gym, will do lifting and cardio, walks outside/hiking, 3x a week typically   PLOF Previous history of dancing, did ballet and modern in college   Living environment Lives with her boyfriend, apartment    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 denies  Problems with stumbling or falling? Denies     Cauda equina questions:  Problems with bowel and bladder control? Specifically retention? See above  Pins and needles or numbness in the saddle area? " Denies     Review of systems:  General - (chills, night sweats, recent infection, fever, weight loss/gain, unexplained night pain, excessive fatigue): Denies  Do you have a history of cancer? Denies  Gastrointestinal system - (abdominal pain, bowel changes, nausea, vomiting, bloating): Bloating that is inconsistent, might have it prior to her menstrual period   Cardiovascular system - (chest pain, palpitations, orthopnea, other): Denies  Respiratory system - (cough, SOB, sputum production, other): Denies  Musculoskeletal system: osteoporosis, vertebral fracture? Denies  Endocrine - (polyuria, polydipsia, heat or cold intolerance, other): Denies  Neurological - (numbness/tingling, falling/stumbling, HA, dizziness, diplopia, dysphagia/dysarthria, double vision, tinnitus, memory, drop attacks, other): Denies  Any long-term steroid use? Denies      Patient goals stop having pain with sexual activity     OBJECTIVE FINDINGS:      ASSESSMENT   PELVIS/POSTURE            LUMBAR AROM    Flexion    Extension    rotation    Sidebending        HIP ROM    ER    IR    Flexion    Extension    Ankle ROM    Lower Extremity Strength    hip flexion    hip extension    Hip IR    Hip ER    Hip abduction    Hip adduction    Knee    Ankle            SPECIAL TESTS    Hamstring length    Hip FADIR    Hip SCOUR    Trendelenburg    Slump test    SLR test    BAM            LUMBAR PALPATION    HIP PALPATION    OTHER PALPATION        SI JOINT TESTING     S/L Compression test    Thigh thrust test    Prone sacral thrust test    Gaenslen test        ABDOMEN    palpation Muscle guarding present and TTP reported with diaphragm palpation and lower abdomen on L>R side   Lower rib angle 90 degrees   Breathing pattern Min to moderate diaphragm engagement with breathing   Diastasis    Skin/ integumentary        BALANCE    GAIT MECHANICS    RUNNING MECHANICS    SQUATTING MECHANICS    LUNGING MECHANICS    BED MOBILITY    SIT TO STAND      Verbal consent  give for internal evaluation and treatment. Pt provides verbal consent for internal assessment     ASSESSMENT   EXTERNAL genital EXAM    PFM OBSERVATION    Skin integrity    contraction    relaxation    Bulge/pelvic drop    Cough    Light touch sensation    INTERNAL PFM exam Internal vaginal   Levator Ani Strength  4/5   Power    Endurance    Repetitions    Quick Flicks    Coordination Demonstrates appropriate lengthening and contraction        Palpation TTP with insertion, muscle guarding and TTP in bilat BC, STP and IC on L>R. Muscle guarding present in bilat BC, STP, and IC     5/11: less discomfort reported with initial insertion, muscle guarding present in bilat BC, STP and IC           TAILBONE                    TREATMENT LOG:  verbal consent for internal intervention: Pt provides verbal consent for internal assessment    Diagnosis  Pelvic Floor Dysfunction    Precautions       PT INITIAL EVALUATION:   4/6/23     PT PROGRESS NOTE:   5/11/23     Y/N Treatment Details: Time:   MODALITIES  14705   0 mins   Heat       Ice       THER ACT          51642   13 mins   OUTCOME MEASURES  PSFS: 40%    Falls Screen, Medication Review, VS, pain assessment Y      Pt Education:verbalized understanding of education and returned demonstration appropriately  Y Pelvic floor anatomy/ purpose function, POC, breathing pattern, abdominal tension influence on PFm, pain cycle of vagina   4/13: reviewed pain cycle of vagina, discussed findings on internal assessment, pelvic stability, intimate mabel dilators  5/4: meditation/mindfullness for nervous system down regulation   5/11: dilator options    HEP / HEP Review Y  eval: quadruped diaphragmatic breathing, incorporating breathing throughout day   4/13: continue breathing, dilators   5/4: child's pose, happy baby, guided relaxation  5/11: pallof press, shoulder extension, flamingo    THER EX         [id number]   15 mins   pallof press y OTB; x20    Shoulder extension y BTB; x20    Flamingo  y BTB; x20                 NEURO RE-ED    44544   0 min    Diaphragmatic breathing n In quadruped, x10                             Guided meditation  n Etelvina Conley Full Body Relaxation     MANUAL                 17089   30 mins   Joint Mobilization       Soft Tissue mobilizations External      Soft Tissue Mobilization Internal y Release to bilat STP, BC and IC    IASTM

## 2023-05-12 NOTE — PROGRESS NOTES
Physical Therapy Progress Note    PT PROGRESS NOTE FOR OUTPATIENT THERAPY    Patient: Mini Hodges MRN: [id number]  : 2000 22 y.o.  Referring Physician: System, Provider Not In  Date of Visit: 2023      Certification Dates: 23 through 23    Recommended Frequency & Duration:  1 time/week for up to 3 months          Diagnosis:   1. Pelvic floor dysfunction        Chief Complaints:  No chief complaint on file.      Precautions:   Existing Precautions/Restrictions: no known precautions/restrictions      TODAY'S VISIT:    Time In Session:  Start Time: 1402  Stop Time: 1500  Time Calculation (min): 58 min   General Information - 23 1402        Session Details    Document Type progress note     Mode of Treatment individual therapy        General Information    Referring Physician ABENA Talbert     History of present illness/functional impairment Mini is a 21 y/o cis gender female referred to PFPT by obgyn with c/o dyspareunia and pain with pelvic exams.     Patient/Family/Caregiver Comments/Observations Mini reports that she was feeling crampy after last session, she reports this period has been a little.     Existing Precautions/Restrictions no known precautions/restrictions                Daily Falls Screen - 23 1922        Daily Falls Assessment    Patient reported fall since last visit No                Pain/Vitals - 23 1402        Pain Assessment    Currently in pain No/Denies        Pre Activity Vital Signs    /66     BP Location Left upper arm     BP Method Manual     Patient Position Sitting                PT - 23 1402        Physical Therapy    Physical Therapy Specialty Pelvic Floor Program: Age 14+        PT Plan    Frequency of treatment 1 time/week     PT Duration 3 months     PT Cert From 23     PT Cert To 23     Date PT POC was sent to provider 23     Signed PT Plan of Care received?  Yes                Assessment and  Plan - 05/11/23 9127        Assessment    Plan of Care reviewed and patient/family in agreement No     System Pathology/Pathophysiology Noted musculoskeletal;neuromuscular;genitourinary     Problem List abnormal muscle tone;decreased endurance;decreased strength;decreased flexibility;impaired coordination;pain     Clinical Assessment Mini has been attending PFPT for 4 visits, she appears to have less guarding present and reports less discomfort with internal assessments. Mini reports her pain has reduced with sexual activity, notes it is still present. She continues to have muscle guarding present in bilat STP, BC and IC. She will benefit from continued skilled PT to reduce pain and improve PFm function     Plan and Recommendations dilator training if able                 OBJECTIVE MEASUREMENTS/DATA:    See treatment log    Outcome Measures        4/6/2023    08:59   PT SUBJECTIVE Outcome Measures   PSFS % Score 40 %       Today's Treatment::    Education provided:  Yes: See treatment log for details of education provided    Mini is a 23 y/o cis gender female presenting to PFPT with c/o dyspareunia and pain with pelvic exams.  Mini went to the gynecologist about a few month ago, she has been having pain with intercourse and was referred to PFPT. She notes always having pain with insertion.     Bladder: Comment:        Urination frequency 6-7x a day   5/11: feeling the same    Urgency:  Can hold if she needs to, will sometimes have cramping since her IUD   Incontinence Denies    Pads denies   Nocturia denies   Pain 1x a night    Emptying Does not need to strain, feels like she fully   Prolapse symptoms  Denies    Liquid consumption Drinks about 3-4 refills of 24 oz water bottle, drinks about 3 cups of coffee, pre workout if she is going to the gym   UTI history When she was first sexually active she had 3 UTIs in a month, has not had a UTI recently          Bowel: Comment:        Frequency 2-3x a day,  "typically spread throughout the day, does not need to strain  5/11: currently on her period so frequency is not her normal    Urge Strong normal urge, can make it    Incontinence denies   Emptying Feels fully empty when she finishes   [place] stool Type 3-4   Fiber Will take fiber, green powder, drink more water and then will take laxatives. Will start taking supplements if she misses a day    Does meal prep, oatmeal or breakfast sandwich or eggs/veggies, potatoes or rice with chicken or turkey for lunch, dinner protein starch and veggie, does a lot protein, will snack on rice cakes and fruit or protein bars and yogurt   Management Fiber, green powder, water and laxatives  5/11: she reports not needing laxatives recently, but has been feeling more bloated on her period    Pain Had a hemorrhoid once about a few years ago, denies pain with BMs         OBGYN Comment:        Pregnancies 0   Births 0   Birth type n/a   Tears/ episiotomies n/a   Surgery No surgeries   Menstruation IUD has been lighter flow, consistent and regular, lots of cramping but has been less since her IUD. Prior heavier flow, was getting a lot of cramping     5/11: currently on her period, flow is heavier this month                    Sexual Activity Comment:        Type Penis vaginal    Pain Initial insertion will be pain, lasts about 2 minutes, feels it reduces after \"muscle relax,\" will have burning/tearing sensation     Does not notice a difference in pain based on position    Tried lubrication once or twice, will have burning but unsure if it was from initial insertion or the lubrication     History of bleeding post intercourse     Will have discomfort for about 5 min    Always has had pain with intercourse     At worst: 7/10   Post intercourse discomfort: 3-4/10     5/11: Less pain with certain positions, but pain is still present. [place] continues to be most painful period. Pain averages 6/10. Less discomfort following intercourse  "   Orgasm Yes    Masturbation Yes, denies pain   Libido Feels it is nonexistence, feels that it could be related to her pain with intercourse    Pain with speculum insertion          Pain Comment:        Low back pain Went for PT previously, has not been noticing back pain since                                Other Comment:        Physical activity  Go to the gym, will do lifting and cardio, walks outside/hiking, 3x a week typically   PLOF Previous history of dancing, did ballet and modern in college   Living environment Lives with her boyfriend, apartment    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 denies  Problems with stumbling or falling? Denies     Cauda equina questions:  Problems with bowel and bladder control? Specifically retention? See above  Pins and needles or numbness in the saddle area? Denies     Review of systems:  General - (chills, night sweats, recent infection, fever, weight loss/gain, unexplained night pain, excessive fatigue): Denies  Do you have a history of cancer? Denies  Gastrointestinal system - (abdominal pain, bowel changes, nausea, vomiting, bloating): Bloating that is inconsistent, might have it prior to her menstrual period   Cardiovascular system - (chest pain, palpitations, orthopnea, other): Denies  Respiratory system - (cough, SOB, sputum production, other): Denies  Musculoskeletal system: osteoporosis, vertebral fracture? Denies  Endocrine - (polyuria, polydipsia, heat or cold intolerance, other): Denies  Neurological - (numbness/tingling, falling/stumbling, HA, dizziness, diplopia, dysphagia/dysarthria, double vision, tinnitus, memory, drop attacks, other): Denies  Any long-term steroid use? Denies      Patient goals stop having pain with  sexual activity     OBJECTIVE FINDINGS:      ASSESSMENT   PELVIS/POSTURE            LUMBAR AROM    Flexion    Extension    rotation    Sidebending        HIP ROM    ER    IR    Flexion    Extension    Ankle ROM    Lower Extremity Strength    hip flexion    hip extension    Hip IR    Hip ER    Hip abduction    Hip adduction    Knee    Ankle            SPECIAL TESTS    Hamstring length    Hip FADIR    Hip SCOUR    Trendelenburg    Slump test    SLR test    BAM            LUMBAR PALPATION    HIP PALPATION    OTHER PALPATION        SI JOINT TESTING     S/L Compression test    Thigh thrust test    Prone sacral thrust test    Gaenslen test        ABDOMEN    palpation Muscle guarding present and TTP reported with diaphragm palpation and lower abdomen on L>R side   Lower rib angle 90 degrees   Breathing pattern Min to moderate diaphragm engagement with breathing   Diastasis    Skin/ integumentary        BALANCE    GAIT MECHANICS    RUNNING MECHANICS    SQUATTING MECHANICS    LUNGING MECHANICS    BED MOBILITY    SIT TO STAND      Verbal consent give for internal evaluation and treatment. Pt provides verbal consent for internal assessment     ASSESSMENT   EXTERNAL genital EXAM    PFM OBSERVATION    Skin integrity    contraction    relaxation    Bulge/pelvic drop    Cough    Light touch sensation    INTERNAL PFM exam Internal vaginal   Levator Ani Strength  4/5   Power    Endurance    Repetitions    Quick Flicks    Coordination Demonstrates appropriate lengthening and contraction        Palpation TTP with insertion, muscle guarding and TTP in bilat BC, STP and IC on L>R. Muscle guarding present in bilat BC, STP, and IC     5/11: less discomfort reported with initial insertion, muscle guarding present in bilat BC, STP and IC           TAILBONE                    TREATMENT LOG:  verbal consent for internal intervention: Pt provides verbal consent for internal assessment    Diagnosis  Pelvic Floor Dysfunction    Precautions        PT INITIAL EVALUATION:   4/6/23     PT PROGRESS NOTE:   5/11/23     Y/N Treatment Details: Time:   MODALITIES  12161   0 mins   Heat       Ice       THER ACT          23856   13 mins   OUTCOME MEASURES  PSFS: 40%    Falls Screen, Medication Review, VS, pain assessment Y      Pt Education:verbalized understanding of education and returned demonstration appropriately  Y Pelvic floor anatomy/ purpose function, POC, breathing pattern, abdominal tension influence on PFm, pain cycle of vagina   4/13: reviewed pain cycle of vagina, discussed findings on internal assessment, pelvic stability, intimate mabel dilators  5/4: meditation/mindfullness for nervous system down regulation   5/11: dilator options    HEP / HEP Review Y  eval: quadruped diaphragmatic breathing, incorporating breathing throughout day   4/13: continue breathing, dilators   5/4: child's pose, happy baby, guided relaxation  5/11: pallof press, shoulder extension, flamingo    THER EX         98805   15 mins   pallof press y OTB; x20    Shoulder extension y BTB; x20    Flamingo y BTB; x20                 NEURO RE-ED    54341   0 min    Diaphragmatic breathing n In quadruped, x10                             Guided meditation  n Etelvina Conley Full Body Relaxation     MANUAL                 49127   30 mins   Joint Mobilization       Soft Tissue mobilizations External      Soft Tissue Mobilization Internal y Release to bilat STP, BC and IC    IASTM                       Goals Addressed                    This Visit's Progress    •  Mutually agreed upon pain goal         Mutually agreed upon pain goal: 0/10    5/11: 6/10      •  pelvic goals (pt-stated)         In 8 weeks Mini will:     Be (I) with pelvic floor contraction/ relaxation/ bearing down in order to improve normal bowel and bladder function. MET    (I) with diaphragmatic breathing for improved Pfm tissue mobility MET    be able to tolerate digital palpation of pelvic floor as well as digital  insertion and removal with pain reduce by 30% in order to tolerate pelvic exams. Progressing    improve PSFS by 30% in order to improve QOL as well as pelvic floor function. Not assessed today    In 16 weeks Mini will:   (I) with HEP in order to maintain and aide in gains made with skilled PT services    (I) with toilet posture and bowel management in order to improve bowel function-     Increase fiber intake by 10-15 grams in order to improve bowel function.     be able to tolerate digital palpation of pelvic floor as well as digital insertion and removal with pain reduce by 80% in order to tolerate pelvic exams.      improve PSFS by 80% in order to improve QOL as well as pelvic floor function.     be (I) with self abdominal myofascial massage to decrease abdominal pain and dysfunction.     Improve gluteal strength by 1/2 MMT GEO in order to decrease demand on pelvic floor and improve overall functional mobility.     Reduced TTP and fascial restrictions in abdomen by 50% in order to improve pelvic floor function and QOL.    Return to pelvic exams without signs/ symptoms of pelvic floor dysfunction.        •  stop having pain with sexual activity (pt-stated)         5/11: 15% improvement since starting PT              Physical Therapy Progress Note

## 2023-05-18 ENCOUNTER — HOSPITAL ENCOUNTER (OUTPATIENT)
Dept: PHYSICAL THERAPY | Age: 23
Setting detail: THERAPIES SERIES
Discharge: HOME | End: 2023-05-18
Attending: NURSE PRACTITIONER
Payer: COMMERCIAL

## 2023-05-18 DIAGNOSIS — M62.89 PELVIC FLOOR DYSFUNCTION: Primary | ICD-10-CM

## 2023-05-18 PROCEDURE — 97140 MANUAL THERAPY 1/> REGIONS: CPT | Mod: GP

## 2023-05-18 PROCEDURE — 97530 THERAPEUTIC ACTIVITIES: CPT | Mod: GP

## 2023-05-18 NOTE — OP PT TREATMENT LOG
Mini is a 23 y/o cis gender female presenting to PFPT with c/o dyspareunia and pain with pelvic exams.  Mini went to the gynecologist about a few month ago, she has been having pain with intercourse and was referred to PFPT. She notes always having pain with insertion.     Bladder: Comment:        Urination frequency 6-7x a day   5/11: feeling the same    Urgency:  Can hold if she needs to, will sometimes have cramping since her IUD   Incontinence Denies    Pads denies   Nocturia denies   Pain 1x a night    Emptying Does not need to strain, feels like she fully   Prolapse symptoms  Denies    Liquid consumption Drinks about 3-4 refills of 24 oz water bottle, drinks about 3 cups of coffee, pre workout if she is going to the gym   UTI history When she was first sexually active she had 3 UTIs in a month, has not had a UTI recently          Bowel: Comment:        Frequency 2-3x a day, typically spread throughout the day, does not need to strain  5/11: currently on her period so frequency is not her normal    Urge Strong normal urge, can make it    Incontinence denies   Emptying Feels fully empty when she finishes   [place] stool Type 3-4   Fiber Will take fiber, green powder, drink more water and then will take laxatives. Will start taking supplements if she misses a day    Does meal prep, oatmeal or breakfast sandwich or eggs/veggies, potatoes or rice with chicken or turkey for lunch, dinner protein starch and veggie, does a lot protein, will snack on rice cakes and fruit or protein bars and yogurt   Management Fiber, green powder, water and laxatives  5/11: she reports not needing laxatives recently, but has been feeling more bloated on her period    Pain Had a hemorrhoid once about a few years ago, denies pain with BMs         OBGYN Comment:        Pregnancies 0   Births 0   Birth type n/a   Tears/ episiotomies n/a   Surgery No surgeries   Menstruation IUD has been lighter flow, consistent and regular, lots of  "cramping but has been less since her IUD. Prior heavier flow, was getting a lot of cramping     5/11: currently on her period, flow is heavier this month                    Sexual Activity Comment:        Type Penis vaginal    Pain Initial insertion will be pain, lasts about 2 minutes, feels it reduces after \"muscle relax,\" will have burning/tearing sensation     Does not notice a difference in pain based on position    Tried lubrication once or twice, will have burning but unsure if it was from initial insertion or the lubrication     History of bleeding post intercourse     Will have discomfort for about 5 min    Always has had pain with intercourse     At worst: 7/10   Post intercourse discomfort: 3-4/10     5/11: Less pain with certain positions, but pain is still present. [place] continues to be most painful period. Pain averages 6/10. Less discomfort following intercourse    Orgasm Yes    Masturbation Yes, denies pain   Libido Feels it is nonexistence, feels that it could be related to her pain with intercourse    Pain with speculum insertion          Pain Comment:        Low back pain Went for PT previously, has not been noticing back pain since                                Other Comment:        Physical activity  Go to the gym, will do lifting and cardio, walks outside/hiking, 3x a week typically   PLOF Previous history of dancing, did ballet and modern in college   Living environment Lives with her boyfriend, apartment    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 denies  Problems with stumbling or falling? Denies     Cauda equina questions:  Problems with bowel and bladder control? Specifically retention? See above  Pins and needles or numbness in the saddle area? " Denies     Review of systems:  General - (chills, night sweats, recent infection, fever, weight loss/gain, unexplained night pain, excessive fatigue): Denies  Do you have a history of cancer? Denies  Gastrointestinal system - (abdominal pain, bowel changes, nausea, vomiting, bloating): Bloating that is inconsistent, might have it prior to her menstrual period   Cardiovascular system - (chest pain, palpitations, orthopnea, other): Denies  Respiratory system - (cough, SOB, sputum production, other): Denies  Musculoskeletal system: osteoporosis, vertebral fracture? Denies  Endocrine - (polyuria, polydipsia, heat or cold intolerance, other): Denies  Neurological - (numbness/tingling, falling/stumbling, HA, dizziness, diplopia, dysphagia/dysarthria, double vision, tinnitus, memory, drop attacks, other): Denies  Any long-term steroid use? Denies      Patient goals stop having pain with sexual activity     OBJECTIVE FINDINGS:      ASSESSMENT   PELVIS/POSTURE            LUMBAR AROM    Flexion    Extension    rotation    Sidebending        HIP ROM    ER    IR    Flexion    Extension    Ankle ROM    Lower Extremity Strength    hip flexion    hip extension    Hip IR    Hip ER    Hip abduction    Hip adduction    Knee    Ankle            SPECIAL TESTS    Hamstring length    Hip FADIR    Hip SCOUR    Trendelenburg    Slump test    SLR test    BAM            LUMBAR PALPATION    HIP PALPATION    OTHER PALPATION        SI JOINT TESTING     S/L Compression test    Thigh thrust test    Prone sacral thrust test    Gaenslen test        ABDOMEN    palpation Muscle guarding present and TTP reported with diaphragm palpation and lower abdomen on L>R side   Lower rib angle 90 degrees   Breathing pattern Min to moderate diaphragm engagement with breathing   Diastasis    Skin/ integumentary        BALANCE    GAIT MECHANICS    RUNNING MECHANICS    SQUATTING MECHANICS    LUNGING MECHANICS    BED MOBILITY    SIT TO STAND      Verbal consent  give for internal evaluation and treatment. Pt provides verbal consent for internal assessment     ASSESSMENT   EXTERNAL genital EXAM    PFM OBSERVATION    Skin integrity    contraction    relaxation    Bulge/pelvic drop    Cough    Light touch sensation    INTERNAL PFM exam Internal vaginal   Levator Ani Strength  4/5   Power    Endurance    Repetitions    Quick Flicks    Coordination Demonstrates appropriate lengthening and contraction        Palpation TTP with insertion, muscle guarding and TTP in bilat BC, STP and IC on L>R. Muscle guarding present in bilat BC, STP, and IC     5/11: less discomfort reported with initial insertion, muscle guarding present in bilat BC, STP and IC           TAILBONE                    TREATMENT LOG:  verbal consent for internal intervention: Pt provides verbal consent for internal assessment    Diagnosis  Pelvic Floor Dysfunction    Precautions       PT INITIAL EVALUATION:   4/6/23     PT PROGRESS NOTE:   5/11/23     Y/N Treatment Details: Time:   MODALITIES  12969   0 mins   Heat       Ice       THER ACT          37102   25 mins   OUTCOME MEASURES  PSFS: 40%    Falls Screen, Medication Review, VS, pain assessment Y      Pt Education:verbalized understanding of education and returned demonstration appropriately  Y Pelvic floor anatomy/ purpose function, POC, breathing pattern, abdominal tension influence on PFm, pain cycle of vagina   4/13: reviewed pain cycle of vagina, discussed findings on internal assessment, pelvic stability, intimate mabel dilators  5/4: meditation/mindfullness for nervous system down regulation   5/11: dilator options  5/18: dilator training, frequency of dilation, handout provided with setup/cleanup/progression for dilators    HEP / HEP Review Y  eval: quadruped diaphragmatic breathing, incorporating breathing throughout day   4/13: continue breathing, dilators   5/4: child's pose, happy baby, guided relaxation  5/11: pallof press, shoulder extension,  flamingo  5/18: dilators    THER EX         [id number]   0 mins   pallof press n OTB; x20    Shoulder extension n BTB; x20    Flamingo n BTB; x20                 NEURO RE-ED    58759   0 min    Diaphragmatic breathing n In quadruped, x10                             Guided meditation  n Etelvina Jarvis Full Body Relaxation     MANUAL                 [id number]   24 mins   Joint Mobilization       Soft Tissue mobilizations External      Soft Tissue Mobilization Internal y Release to bilat STP, BC and IC    IASTM

## 2023-05-25 ENCOUNTER — HOSPITAL ENCOUNTER (OUTPATIENT)
Dept: PHYSICAL THERAPY | Age: 23
Setting detail: THERAPIES SERIES
Discharge: HOME | End: 2023-05-25
Attending: NURSE PRACTITIONER
Payer: COMMERCIAL

## 2023-05-25 DIAGNOSIS — M62.89 PELVIC FLOOR DYSFUNCTION: Primary | ICD-10-CM

## 2023-05-25 PROCEDURE — 97110 THERAPEUTIC EXERCISES: CPT | Mod: GP

## 2023-05-25 PROCEDURE — 97140 MANUAL THERAPY 1/> REGIONS: CPT | Mod: GP

## 2023-05-25 PROCEDURE — 97530 THERAPEUTIC ACTIVITIES: CPT | Mod: GP

## 2023-05-25 NOTE — OP PT TREATMENT LOG
Mini is a 23 y/o cis gender female presenting to PFPT with c/o dyspareunia and pain with pelvic exams.  Mini went to the gynecologist about a few month ago, she has been having pain with intercourse and was referred to PFPT. She notes always having pain with insertion.     Bladder: Comment:        Urination frequency 6-7x a day   5/11: feeling the same    Urgency:  Can hold if she needs to, will sometimes have cramping since her IUD   Incontinence Denies    Pads denies   Nocturia denies   Pain 1x a night    Emptying Does not need to strain, feels like she fully   Prolapse symptoms  Denies    Liquid consumption Drinks about 3-4 refills of 24 oz water bottle, drinks about 3 cups of coffee, pre workout if she is going to the gym   UTI history When she was first sexually active she had 3 UTIs in a month, has not had a UTI recently          Bowel: Comment:        Frequency 2-3x a day, typically spread throughout the day, does not need to strain  5/11: currently on her period so frequency is not her normal    Urge Strong normal urge, can make it    Incontinence denies   Emptying Feels fully empty when she finishes   [place] stool Type 3-4   Fiber Will take fiber, green powder, drink more water and then will take laxatives. Will start taking supplements if she misses a day    Does meal prep, oatmeal or breakfast sandwich or eggs/veggies, potatoes or rice with chicken or turkey for lunch, dinner protein starch and veggie, does a lot protein, will snack on rice cakes and fruit or protein bars and yogurt   Management Fiber, green powder, water and laxatives  5/11: she reports not needing laxatives recently, but has been feeling more bloated on her period    Pain Had a hemorrhoid once about a few years ago, denies pain with BMs         OBGYN Comment:        Pregnancies 0   Births 0   Birth type n/a   Tears/ episiotomies n/a   Surgery No surgeries   Menstruation IUD has been lighter flow, consistent and regular, lots of  "cramping but has been less since her IUD. Prior heavier flow, was getting a lot of cramping     5/11: currently on her period, flow is heavier this month                    Sexual Activity Comment:        Type Penis vaginal    Pain Initial insertion will be pain, lasts about 2 minutes, feels it reduces after \"muscle relax,\" will have burning/tearing sensation     Does not notice a difference in pain based on position    Tried lubrication once or twice, will have burning but unsure if it was from initial insertion or the lubrication     History of bleeding post intercourse     Will have discomfort for about 5 min    Always has had pain with intercourse     At worst: 7/10   Post intercourse discomfort: 3-4/10     5/11: Less pain with certain positions, but pain is still present. [facility] continues to be most painful period. Pain averages 6/10. Less discomfort following intercourse    Orgasm Yes    Masturbation Yes, denies pain   Libido Feels it is nonexistence, feels that it could be related to her pain with intercourse    Pain with speculum insertion          Pain Comment:        Low back pain Went for PT previously, has not been noticing back pain since                                Other Comment:        Physical activity  Go to the gym, will do lifting and cardio, walks outside/hiking, 3x a week typically   PLOF Previous history of dancing, did ballet and modern in college   Living environment Lives with her boyfriend, apartment    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 denies  Problems with stumbling or falling? Denies     Cauda equina questions:  Problems with bowel and bladder control? Specifically retention? See above  Pins and needles or numbness in the saddle area? " Denies     Review of systems:  General - (chills, night sweats, recent infection, fever, weight loss/gain, unexplained night pain, excessive fatigue): Denies  Do you have a history of cancer? Denies  Gastrointestinal system - (abdominal pain, bowel changes, nausea, vomiting, bloating): Bloating that is inconsistent, might have it prior to her menstrual period   Cardiovascular system - (chest pain, palpitations, orthopnea, other): Denies  Respiratory system - (cough, SOB, sputum production, other): Denies  Musculoskeletal system: osteoporosis, vertebral fracture? Denies  Endocrine - (polyuria, polydipsia, heat or cold intolerance, other): Denies  Neurological - (numbness/tingling, falling/stumbling, HA, dizziness, diplopia, dysphagia/dysarthria, double vision, tinnitus, memory, drop attacks, other): Denies  Any long-term steroid use? Denies      Patient goals stop having pain with sexual activity     OBJECTIVE FINDINGS:      ASSESSMENT   PELVIS/POSTURE            LUMBAR AROM    Flexion    Extension    rotation    Sidebending        HIP ROM    ER    IR    Flexion    Extension    Ankle ROM    Lower Extremity Strength    hip flexion    hip extension    Hip IR    Hip ER    Hip abduction    Hip adduction    Knee    Ankle            SPECIAL TESTS    Hamstring length    Hip FADIR    Hip SCOUR    Trendelenburg    Slump test    SLR test    BAM            LUMBAR PALPATION    HIP PALPATION    OTHER PALPATION        SI JOINT TESTING     S/L Compression test    Thigh thrust test    Prone sacral thrust test    Gaenslen test        ABDOMEN    palpation Muscle guarding present and TTP reported with diaphragm palpation and lower abdomen on L>R side   Lower rib angle 90 degrees   Breathing pattern Min to moderate diaphragm engagement with breathing   Diastasis    Skin/ integumentary        BALANCE    GAIT MECHANICS    RUNNING MECHANICS    SQUATTING MECHANICS    LUNGING MECHANICS    BED MOBILITY    SIT TO STAND      Verbal consent  give for internal evaluation and treatment. Pt provides verbal consent for internal assessment     ASSESSMENT   EXTERNAL genital EXAM    PFM OBSERVATION    Skin integrity    contraction    relaxation    Bulge/pelvic drop    Cough    Light touch sensation    INTERNAL PFM exam Internal vaginal   Levator Ani Strength  4/5   Power    Endurance    Repetitions    Quick Flicks    Coordination Demonstrates appropriate lengthening and contraction        Palpation TTP with insertion, muscle guarding and TTP in bilat BC, STP and IC on L>R. Muscle guarding present in bilat BC, STP, and IC     5/11: less discomfort reported with initial insertion, muscle guarding present in bilat BC, STP and IC           TAILBONE                    TREATMENT LOG:  verbal consent for internal intervention: Pt provides verbal consent for internal assessment    Diagnosis  Pelvic Floor Dysfunction    Precautions       PT INITIAL EVALUATION:   4/6/23     PT PROGRESS NOTE:   5/11/23     Y/N Treatment Details: Time:   MODALITIES  49805   0 mins   Heat       Ice       THER ACT          53903   10 mins   OUTCOME MEASURES  PSFS: 40%    Falls Screen, Medication Review, VS, pain assessment Y      Pt Education:verbalized understanding of education and returned demonstration appropriately  Y Pelvic floor anatomy/ purpose function, POC, breathing pattern, abdominal tension influence on PFm, pain cycle of vagina   4/13: reviewed pain cycle of vagina, discussed findings on internal assessment, pelvic stability, intimate mabel dilators  5/4: meditation/mindfullness for nervous system down regulation   5/11: dilator options  5/18: dilator training, frequency of dilation, handout provided with setup/cleanup/progression for dilators    HEP / HEP Review Y  eval: quadruped diaphragmatic breathing, incorporating breathing throughout day   4/13: continue breathing, dilators   5/4: child's pose, happy baby, guided relaxation  5/11: pallof press, shoulder extension,  flamingo  5/18: dilators    THER EX         [id number]   15 mins   pallof press n OTB; x20    Shoulder extension n BTB; x20    Flamingo n BTB; x20    Lunge into hip flexion y 10lb; x10    Half kneel chops y 10lb, x 15    Standing wrap arounds y 10lbs; 3 x 30sec                              NEURO RE-ED    [id number]   0 min    Diaphragmatic breathing n In quadruped, x10                             Guided meditation  n Etelvina [place] Full Body Relaxation     MANUAL                 [id number]   24 mins   Joint Mobilization       Soft Tissue mobilizations External      Soft Tissue Mobilization Internal y Release to bilat STP, BC and IC    IASTM

## 2023-05-25 NOTE — PROGRESS NOTES
Physical Therapy Visit    PT DAILY NOTE FOR OUTPATIENT THERAPY    Patient: Mini Hodges MRN: [id number]  : 2000 22 y.o.  Referring Physician: System, Provider Not In  Date of Visit: 2023    Certification Dates: 23 through 23    Diagnosis:   1. Pelvic floor dysfunction        Chief Complaints:       Precautions:   Existing Precautions/Restrictions: no known precautions/restrictions      TODAY'S VISIT    Time In Session:  Start Time: 910 (pt arrives late)  Stop Time: 959  Time Calculation (min): 49 min   History/Vitals/Pain/Encounter Info - 23 09        Injury History/Precautions/Daily Required Info    Document Type daily treatment     Primary Therapist Andree     Referring Physician ABENA Talbert     Existing Precautions/Restrictions no known precautions/restrictions     History of present illness/functional impairment Mini is a 23 y/o cis gender female referred to PFPT by obgyn with c/o dyspareunia and pain with pelvic exams.     Patient/Family/Caregiver Comments/Observations Mini reports that she was able to try her dilators once since last session, she denies pain with using them.     Patient reported fall since last visit No        Pain Assessment    Currently in pain No/Denies        Pre Activity Vital Signs    /68     BP Location Left upper arm     BP Method Manual     Patient Position Sitting                Daily Treatment Assessment and Plan - 23 09        Daily Treatment Assessment and Plan    Progress toward goals Progressing     Daily Outcome Summary Continued internal release, mild discomfort reported with initial insertion. Guarding present in BC, STP and DTP that reduces with prolonged pressure and diaphragmatic breathing. Reviewed frequency of dilator training for at home. Continued to progress pelvic stability/core strengthening, Mini appears to have increased difficulty with lunge to hip flexion when balancing on LLE. She will benefit  from continued skilled PT to reduce pain and improve function of PFm     Plan and Recommendations internal, core strengthening                     OBJECTIVE DATA TAKEN TODAY:    None taken    Today's Treatment:    Mini is a 23 y/o cis gender female presenting to PFPT with c/o dyspareunia and pain with pelvic exams.  Mini went to the gynecologist about a few month ago, she has been having pain with intercourse and was referred to PFPT. She notes always having pain with insertion.     Bladder: Comment:        Urination frequency 6-7x a day   5/11: feeling the same    Urgency:  Can hold if she needs to, will sometimes have cramping since her IUD   Incontinence Denies    Pads denies   Nocturia denies   Pain 1x a night    Emptying Does not need to strain, feels like she fully   Prolapse symptoms  Denies    Liquid consumption Drinks about 3-4 refills of 24 oz water bottle, drinks about 3 cups of coffee, pre workout if she is going to the gym   UTI history When she was first sexually active she had 3 UTIs in a month, has not had a UTI recently          Bowel: Comment:        Frequency 2-3x a day, typically spread throughout the day, does not need to strain  5/11: currently on her period so frequency is not her normal    Urge Strong normal urge, can make it    Incontinence denies   Emptying Feels fully empty when she finishes   [employer] stool Type 3-4   Fiber Will take fiber, green powder, drink more water and then will take laxatives. Will start taking supplements if she misses a day    Does meal prep, oatmeal or breakfast sandwich or eggs/veggies, potatoes or rice with chicken or turkey for lunch, dinner protein starch and veggie, does a lot protein, will snack on rice cakes and fruit or protein bars and yogurt   Management Fiber, green powder, water and laxatives  5/11: she reports not needing laxatives recently, but has been feeling more bloated on her period    Pain Had a hemorrhoid once about a few years ago,  "denies pain with BMs         OBGYN Comment:        Pregnancies 0   Births 0   Birth type n/a   Tears/ episiotomies n/a   Surgery No surgeries   Menstruation IUD has been lighter flow, consistent and regular, lots of cramping but has been less since her IUD. Prior heavier flow, was getting a lot of cramping     5/11: currently on her period, flow is heavier this month                    Sexual Activity Comment:        Type Penis vaginal    Pain Initial insertion will be pain, lasts about 2 minutes, feels it reduces after \"muscle relax,\" will have burning/tearing sensation     Does not notice a difference in pain based on position    Tried lubrication once or twice, will have burning but unsure if it was from initial insertion or the lubrication     History of bleeding post intercourse     Will have discomfort for about 5 min    Always has had pain with intercourse     At worst: 7/10   Post intercourse discomfort: 3-4/10     5/11: Less pain with certain positions, but pain is still present. [place] continues to be most painful period. Pain averages 6/10. Less discomfort following intercourse    Orgasm Yes    Masturbation Yes, denies pain   Libido Feels it is nonexistence, feels that it could be related to her pain with intercourse    Pain with speculum insertion          Pain Comment:        Low back pain Went for PT previously, has not been noticing back pain since                                Other Comment:        Physical activity  Go to the gym, will do lifting and cardio, walks outside/hiking, 3x a week typically   PLOF Previous history of dancing, did ballet and modern in college   Living environment Lives with her boyfriend, apartment    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 denies  Problems with stumbling or falling? Denies     Cauda equina questions:  Problems with bowel and bladder control? Specifically retention? See above  Pins and needles or numbness in the saddle area? Denies     Review of systems:  General - (chills, night sweats, recent infection, fever, weight loss/gain, unexplained night pain, excessive fatigue): Denies  Do you have a history of cancer? Denies  Gastrointestinal system - (abdominal pain, bowel changes, nausea, vomiting, bloating): Bloating that is inconsistent, might have it prior to her menstrual period   Cardiovascular system - (chest pain, palpitations, orthopnea, other): Denies  Respiratory system - (cough, SOB, sputum production, other): Denies  Musculoskeletal system: osteoporosis, vertebral fracture? Denies  Endocrine - (polyuria, polydipsia, heat or cold intolerance, other): Denies  Neurological - (numbness/tingling, falling/stumbling, HA, dizziness, diplopia, dysphagia/dysarthria, double vision, tinnitus, memory, drop attacks, other): Denies  Any long-term steroid use? Denies      Patient goals stop having pain with sexual activity     OBJECTIVE FINDINGS:      ASSESSMENT   PELVIS/POSTURE            LUMBAR AROM    Flexion    Extension    rotation    Sidebending        HIP ROM    ER    IR    Flexion    Extension    Ankle ROM    Lower Extremity Strength    hip flexion    hip extension    Hip IR    Hip ER    Hip abduction    Hip adduction    Knee    Ankle            SPECIAL TESTS    Hamstring length    Hip FADIR    Hip SCOUR    Trendelenburg    Slump test    SLR test    BAM            LUMBAR PALPATION    HIP PALPATION    OTHER PALPATION        SI JOINT TESTING     S/L Compression test    Thigh thrust test    Prone sacral thrust test    Gaenslen test        ABDOMEN    palpation Muscle guarding present and TTP reported with diaphragm palpation and lower abdomen on L>R side   Lower rib angle 90 degrees   Breathing pattern Min to moderate  diaphragm engagement with breathing   Diastasis    Skin/ integumentary        BALANCE    GAIT MECHANICS    RUNNING MECHANICS    SQUATTING MECHANICS    LUNGING MECHANICS    BED MOBILITY    SIT TO STAND      Verbal consent give for internal evaluation and treatment. Pt provides verbal consent for internal assessment     ASSESSMENT   EXTERNAL genital EXAM    PFM OBSERVATION    Skin integrity    contraction    relaxation    Bulge/pelvic drop    Cough    Light touch sensation    INTERNAL PFM exam Internal vaginal   Levator Ani Strength  4/5   Power    Endurance    Repetitions    Quick Flicks    Coordination Demonstrates appropriate lengthening and contraction        Palpation TTP with insertion, muscle guarding and TTP in bilat BC, STP and IC on L>R. Muscle guarding present in bilat BC, STP, and IC     5/11: less discomfort reported with initial insertion, muscle guarding present in bilat BC, STP and IC           TAILBONE                    TREATMENT LOG:  verbal consent for internal intervention: Pt provides verbal consent for internal assessment    Diagnosis  Pelvic Floor Dysfunction    Precautions       PT INITIAL EVALUATION:   4/6/23     PT PROGRESS NOTE:   5/11/23     Y/N Treatment Details: Time:   MODALITIES  57688   0 mins   Heat       Ice       THER ACT          85143   10 mins   OUTCOME MEASURES  PSFS: 40%    Falls Screen, Medication Review, VS, pain assessment Y      Pt Education:verbalized understanding of education and returned demonstration appropriately  Y Pelvic floor anatomy/ purpose function, POC, breathing pattern, abdominal tension influence on PFm, pain cycle of vagina   4/13: reviewed pain cycle of vagina, discussed findings on internal assessment, pelvic stability, intimate mabel dilators  5/4: meditation/mindfullness for nervous system down regulation   5/11: dilator options  5/18: dilator training, frequency of dilation, handout provided with setup/cleanup/progression for dilators    HEP / HEP  Review Y  eval: quadruped diaphragmatic breathing, incorporating breathing throughout day   4/13: continue breathing, dilators   5/4: child's pose, happy baby, guided relaxation  5/11: pallof press, shoulder extension, flamingo  5/18: dilators    THER EX         [id number]   15 mins   pallof press n OTB; x20    Shoulder extension n BTB; x20    Flamingo n BTB; x20    Lunge into hip flexion y 10lb; x10    Half kneel chops y 10lb, x 15    Standing wrap arounds y 10lbs; 3 x 30sec                              NEURO RE-ED    03015   0 min    Diaphragmatic breathing n In quadruped, x10                             Guided meditation  n Etelvina [place] Full Body Relaxation     MANUAL                 [id number]   24 mins   Joint Mobilization       Soft Tissue mobilizations External      Soft Tissue Mobilization Internal y Release to bilat STP, BC and IC    IASTM

## 2023-06-01 ENCOUNTER — HOSPITAL ENCOUNTER (OUTPATIENT)
Dept: PHYSICAL THERAPY | Age: 23
Setting detail: THERAPIES SERIES
Discharge: HOME | End: 2023-06-01
Attending: NURSE PRACTITIONER
Payer: COMMERCIAL

## 2023-06-01 DIAGNOSIS — M62.89 PELVIC FLOOR DYSFUNCTION: Primary | ICD-10-CM

## 2023-06-01 PROCEDURE — 97140 MANUAL THERAPY 1/> REGIONS: CPT | Mod: GP

## 2023-06-01 PROCEDURE — 97530 THERAPEUTIC ACTIVITIES: CPT | Mod: GP

## 2023-06-01 PROCEDURE — 97112 NEUROMUSCULAR REEDUCATION: CPT | Mod: GP

## 2023-06-01 NOTE — OP PT TREATMENT LOG
Mini is a 23 y/o cis gender female presenting to PFPT with c/o dyspareunia and pain with pelvic exams.  Mini went to the gynecologist about a few month ago, she has been having pain with intercourse and was referred to PFPT. She notes always having pain with insertion.     Bladder: Comment:        Urination frequency 6-7x a day   5/11: feeling the same    Urgency:  Can hold if she needs to, will sometimes have cramping since her IUD   Incontinence Denies    Pads denies   Nocturia denies   Pain 1x a night    Emptying Does not need to strain, feels like she fully   Prolapse symptoms  Denies    Liquid consumption Drinks about 3-4 refills of 24 oz water bottle, drinks about 3 cups of coffee, pre workout if she is going to the gym   UTI history When she was first sexually active she had 3 UTIs in a month, has not had a UTI recently          Bowel: Comment:        Frequency 2-3x a day, typically spread throughout the day, does not need to strain  5/11: currently on her period so frequency is not her normal    Urge Strong normal urge, can make it    Incontinence denies   Emptying Feels fully empty when she finishes   [place] stool Type 3-4   Fiber Will take fiber, green powder, drink more water and then will take laxatives. Will start taking supplements if she misses a day    Does meal prep, oatmeal or breakfast sandwich or eggs/veggies, potatoes or rice with chicken or turkey for lunch, dinner protein starch and veggie, does a lot protein, will snack on rice cakes and fruit or protein bars and yogurt   Management Fiber, green powder, water and laxatives  5/11: she reports not needing laxatives recently, but has been feeling more bloated on her period    Pain Had a hemorrhoid once about a few years ago, denies pain with BMs         OBGYN Comment:        Pregnancies 0   Births 0   Birth type n/a   Tears/ episiotomies n/a   Surgery No surgeries   Menstruation IUD has been lighter flow, consistent and regular, lots of  "cramping but has been less since her IUD. Prior heavier flow, was getting a lot of cramping     5/11: currently on her period, flow is heavier this month                    Sexual Activity Comment:        Type Penis vaginal    Pain Initial insertion will be pain, lasts about 2 minutes, feels it reduces after \"muscle relax,\" will have burning/tearing sensation     Does not notice a difference in pain based on position    Tried lubrication once or twice, will have burning but unsure if it was from initial insertion or the lubrication     History of bleeding post intercourse     Will have discomfort for about 5 min    Always has had pain with intercourse     At worst: 7/10   Post intercourse discomfort: 3-4/10     5/11: Less pain with certain positions, but pain is still present. [place] continues to be most painful period. Pain averages 6/10. Less discomfort following intercourse    Orgasm Yes    Masturbation Yes, denies pain   Libido Feels it is nonexistence, feels that it could be related to her pain with intercourse    Pain with speculum insertion          Pain Comment:        Low back pain Went for PT previously, has not been noticing back pain since                                Other Comment:        Physical activity  Go to the gym, will do lifting and cardio, walks outside/hiking, 3x a week typically   PLOF Previous history of dancing, did ballet and modern in college   Living environment Lives with her boyfriend, apartment    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 denies  Problems with stumbling or falling? Denies     Cauda equina questions:  Problems with bowel and bladder control? Specifically retention? See above  Pins and needles or numbness in the saddle area? " Denies     Review of systems:  General - (chills, night sweats, recent infection, fever, weight loss/gain, unexplained night pain, excessive fatigue): Denies  Do you have a history of cancer? Denies  Gastrointestinal system - (abdominal pain, bowel changes, nausea, vomiting, bloating): Bloating that is inconsistent, might have it prior to her menstrual period   Cardiovascular system - (chest pain, palpitations, orthopnea, other): Denies  Respiratory system - (cough, SOB, sputum production, other): Denies  Musculoskeletal system: osteoporosis, vertebral fracture? Denies  Endocrine - (polyuria, polydipsia, heat or cold intolerance, other): Denies  Neurological - (numbness/tingling, falling/stumbling, HA, dizziness, diplopia, dysphagia/dysarthria, double vision, tinnitus, memory, drop attacks, other): Denies  Any long-term steroid use? Denies      Patient goals stop having pain with sexual activity     OBJECTIVE FINDINGS:      ASSESSMENT   PELVIS/POSTURE            LUMBAR AROM    Flexion    Extension    rotation    Sidebending        HIP ROM    ER    IR    Flexion    Extension    Ankle ROM    Lower Extremity Strength    hip flexion    hip extension    Hip IR    Hip ER    Hip abduction    Hip adduction    Knee    Ankle            SPECIAL TESTS    Hamstring length    Hip FADIR    Hip SCOUR    Trendelenburg    Slump test    SLR test    BAM            LUMBAR PALPATION    HIP PALPATION    OTHER PALPATION        SI JOINT TESTING     S/L Compression test    Thigh thrust test    Prone sacral thrust test    Gaenslen test        ABDOMEN    palpation Muscle guarding present and TTP reported with diaphragm palpation and lower abdomen on L>R side   Lower rib angle 90 degrees   Breathing pattern Min to moderate diaphragm engagement with breathing   Diastasis    Skin/ integumentary        BALANCE    GAIT MECHANICS    RUNNING MECHANICS    SQUATTING MECHANICS    LUNGING MECHANICS    BED MOBILITY    SIT TO STAND      Verbal consent  give for internal evaluation and treatment. Pt provides verbal consent for internal assessment     ASSESSMENT   EXTERNAL genital EXAM    PFM OBSERVATION    Skin integrity    contraction    relaxation    Bulge/pelvic drop    Cough    Light touch sensation    INTERNAL PFM exam Internal vaginal   Levator Ani Strength  4/5   Power    Endurance    Repetitions    Quick Flicks    Coordination Demonstrates appropriate lengthening and contraction        Palpation TTP with insertion, muscle guarding and TTP in bilat BC, STP and IC on L>R. Muscle guarding present in bilat BC, STP, and IC     5/11: less discomfort reported with initial insertion, muscle guarding present in bilat BC, STP and IC           TAILBONE                    TREATMENT LOG:  verbal consent for internal intervention: Pt provides verbal consent for internal assessment    Diagnosis  Pelvic Floor Dysfunction    Precautions       PT INITIAL EVALUATION:   4/6/23     PT PROGRESS NOTE:   5/11/23     Y/N Treatment Details: Time:   MODALITIES  14554   0 mins   Heat       Ice       THER ACT          85799   10 mins   OUTCOME MEASURES  PSFS: 40%    Falls Screen, Medication Review, VS, pain assessment Y      Pt Education:verbalized understanding of education and returned demonstration appropriately  Y Pelvic floor anatomy/ purpose function, POC, breathing pattern, abdominal tension influence on PFm, pain cycle of vagina   4/13: reviewed pain cycle of vagina, discussed findings on internal assessment, pelvic stability, intimate mabel dilators  5/4: meditation/mindfullness for nervous system down regulation   5/11: dilator options  5/18: dilator training, frequency of dilation, handout provided with setup/cleanup/progression for dilators    HEP / HEP Review Y  eval: quadruped diaphragmatic breathing, incorporating breathing throughout day   4/13: continue breathing, dilators   5/4: child's pose, happy baby, guided relaxation  5/11: pallof press, shoulder extension,  flamingo  5/18: dilators  6/1: dilators for home release, cupping to lower abdomen     THER EX         71893   0 mins   pallof press n OTB; x20    Shoulder extension n BTB; x20    Flamingo n BTB; x20    Lunge into hip flexion n 10lb; x10    Half kneel chops n 10lb, x 15    Standing wrap arounds n 10lbs; 3 x 30sec                              NEURO RE-ED    [id number]   16 min    Diaphragmatic breathing n In quadruped, x10     Pec stretch y 2 min on foam roller    Thoracic extension  y On foam, x 20     Cat/camel y x20           Guided meditation  n Etelvina [place] Full Body Relaxation     MANUAL                 [id number]   30 mins   Joint Mobilization       Soft Tissue mobilizations External      Soft Tissue Mobilization Internal y Release to bilat STP, BC and IC    IASTM y Lower abdomen myofascial release

## 2023-06-02 NOTE — PROGRESS NOTES
Physical Therapy Visit    PT DAILY NOTE FOR OUTPATIENT THERAPY    Patient: Mnii Hodges MRN: [id number]  : 2000 22 y.o.  Referring Physician: System, Provider Not In  Date of Visit: 2023    Certification Dates: 23 through 23    Diagnosis:   1. Pelvic floor dysfunction        Chief Complaints:       Precautions:   Existing Precautions/Restrictions: no known precautions/restrictions      TODAY'S VISIT    Time In Session:  Start Time: 1402  Stop Time: 1458  Time Calculation (min): 56 min   History/Vitals/Pain/Encounter Info - 23 1403        Injury History/Precautions/Daily Required Info    Document Type daily treatment     Primary Therapist Andree     Referring Physician ABENA Talbert     Existing Precautions/Restrictions no known precautions/restrictions     History of present illness/functional impairment Mini is a 21 y/o cis gender female referred to PFPT by obgyn with c/o dyspareunia and pain with pelvic exams.     Patient/Family/Caregiver Comments/Observations Mini reports she was unable to use her dilators since last visit due to her schedule being busy. She reports having intense cramping yesterday that were worse than usual, she needed to sit with a heating pad all yesterday.     Patient reported fall since last visit No        Pain Assessment    Currently in pain No/Denies        Pre Activity Vital Signs    /66     BP Location Left upper arm     BP Method Manual     Patient Position Sitting                Daily Treatment Assessment and Plan - 23 1403        Daily Treatment Assessment and Plan    Progress toward goals Progressing     Daily Outcome Summary Guarding present in bilat BC and STP, no TTP reported with insertion. Initiated lower abdominal IASTM for myofascial release to reduce strain on PFm, Mini reports feeling less tense following release. Reviewed dilator training for at home release and continued to progress exercises. She will benefit  from continued skilled PT to reduce pain and improve function of PFM     Plan and Recommendations internal, core strengthening                     OBJECTIVE DATA TAKEN TODAY:    None taken    Today's Treatment:    Mini is a 21 y/o cis gender female presenting to PFPT with c/o dyspareunia and pain with pelvic exams.  Mini went to the gynecologist about a few month ago, she has been having pain with intercourse and was referred to PFPT. She notes always having pain with insertion.     Bladder: Comment:        Urination frequency 6-7x a day   5/11: feeling the same    Urgency:  Can hold if she needs to, will sometimes have cramping since her IUD   Incontinence Denies    Pads denies   Nocturia denies   Pain 1x a night    Emptying Does not need to strain, feels like she fully   Prolapse symptoms  Denies    Liquid consumption Drinks about 3-4 refills of 24 oz water bottle, drinks about 3 cups of coffee, pre workout if she is going to the gym   UTI history When she was first sexually active she had 3 UTIs in a month, has not had a UTI recently          Bowel: Comment:        Frequency 2-3x a day, typically spread throughout the day, does not need to strain  5/11: currently on her period so frequency is not her normal    Urge Strong normal urge, can make it    Incontinence denies   Emptying Feels fully empty when she finishes   [place] stool Type 3-4   Fiber Will take fiber, green powder, drink more water and then will take laxatives. Will start taking supplements if she misses a day    Does meal prep, oatmeal or breakfast sandwich or eggs/veggies, potatoes or rice with chicken or turkey for lunch, dinner protein starch and veggie, does a lot protein, will snack on rice cakes and fruit or protein bars and yogurt   Management Fiber, green powder, water and laxatives  5/11: she reports not needing laxatives recently, but has been feeling more bloated on her period    Pain Had a hemorrhoid once about a few years ago,  "denies pain with BMs         OBGYN Comment:        Pregnancies 0   Births 0   Birth type n/a   Tears/ episiotomies n/a   Surgery No surgeries   Menstruation IUD has been lighter flow, consistent and regular, lots of cramping but has been less since her IUD. Prior heavier flow, was getting a lot of cramping     5/11: currently on her period, flow is heavier this month                    Sexual Activity Comment:        Type Penis vaginal    Pain Initial insertion will be pain, lasts about 2 minutes, feels it reduces after \"muscle relax,\" will have burning/tearing sensation     Does not notice a difference in pain based on position    Tried lubrication once or twice, will have burning but unsure if it was from initial insertion or the lubrication     History of bleeding post intercourse     Will have discomfort for about 5 min    Always has had pain with intercourse     At worst: 7/10   Post intercourse discomfort: 3-4/10     5/11: Less pain with certain positions, but pain is still present. [place] continues to be most painful period. Pain averages 6/10. Less discomfort following intercourse    Orgasm Yes    Masturbation Yes, denies pain   Libido Feels it is nonexistence, feels that it could be related to her pain with intercourse    Pain with speculum insertion          Pain Comment:        Low back pain Went for PT previously, has not been noticing back pain since                                Other Comment:        Physical activity  Go to the gym, will do lifting and cardio, walks outside/hiking, 3x a week typically   PLOF Previous history of dancing, did ballet and modern in college   Living environment Lives with her boyfriend, apartment    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 denies  Problems with stumbling or falling? Denies     Cauda equina questions:  Problems with bowel and bladder control? Specifically retention? See above  Pins and needles or numbness in the saddle area? Denies     Review of systems:  General - (chills, night sweats, recent infection, fever, weight loss/gain, unexplained night pain, excessive fatigue): Denies  Do you have a history of cancer? Denies  Gastrointestinal system - (abdominal pain, bowel changes, nausea, vomiting, bloating): Bloating that is inconsistent, might have it prior to her menstrual period   Cardiovascular system - (chest pain, palpitations, orthopnea, other): Denies  Respiratory system - (cough, SOB, sputum production, other): Denies  Musculoskeletal system: osteoporosis, vertebral fracture? Denies  Endocrine - (polyuria, polydipsia, heat or cold intolerance, other): Denies  Neurological - (numbness/tingling, falling/stumbling, HA, dizziness, diplopia, dysphagia/dysarthria, double vision, tinnitus, memory, drop attacks, other): Denies  Any long-term steroid use? Denies      Patient goals stop having pain with sexual activity     OBJECTIVE FINDINGS:      ASSESSMENT   PELVIS/POSTURE            LUMBAR AROM    Flexion    Extension    rotation    Sidebending        HIP ROM    ER    IR    Flexion    Extension    Ankle ROM    Lower Extremity Strength    hip flexion    hip extension    Hip IR    Hip ER    Hip abduction    Hip adduction    Knee    Ankle            SPECIAL TESTS    Hamstring length    Hip FADIR    Hip SCOUR    Trendelenburg    Slump test    SLR test    BAM            LUMBAR PALPATION    HIP PALPATION    OTHER PALPATION        SI JOINT TESTING     S/L Compression test    Thigh thrust test    Prone sacral thrust test    Gaenslen test        ABDOMEN    palpation Muscle guarding present and TTP reported with diaphragm palpation and lower abdomen on L>R side   Lower rib angle 90 degrees   Breathing pattern Min to moderate  diaphragm engagement with breathing   Diastasis    Skin/ integumentary        BALANCE    GAIT MECHANICS    RUNNING MECHANICS    SQUATTING MECHANICS    LUNGING MECHANICS    BED MOBILITY    SIT TO STAND      Verbal consent give for internal evaluation and treatment. Pt provides verbal consent for internal assessment     ASSESSMENT   EXTERNAL genital EXAM    PFM OBSERVATION    Skin integrity    contraction    relaxation    Bulge/pelvic drop    Cough    Light touch sensation    INTERNAL PFM exam Internal vaginal   Levator Ani Strength  4/5   Power    Endurance    Repetitions    Quick Flicks    Coordination Demonstrates appropriate lengthening and contraction        Palpation TTP with insertion, muscle guarding and TTP in bilat BC, STP and IC on L>R. Muscle guarding present in bilat BC, STP, and IC     5/11: less discomfort reported with initial insertion, muscle guarding present in bilat BC, STP and IC           TAILBONE                    TREATMENT LOG:  verbal consent for internal intervention: Pt provides verbal consent for internal assessment    Diagnosis  Pelvic Floor Dysfunction    Precautions       PT INITIAL EVALUATION:   4/6/23     PT PROGRESS NOTE:   5/11/23     Y/N Treatment Details: Time:   MODALITIES  46557   0 mins   Heat       Ice       THER ACT          [id number]   10 mins   OUTCOME MEASURES  PSFS: 40%    Falls Screen, Medication Review, VS, pain assessment Y      Pt Education:verbalized understanding of education and returned demonstration appropriately  Y Pelvic floor anatomy/ purpose function, POC, breathing pattern, abdominal tension influence on PFm, pain cycle of vagina   4/13: reviewed pain cycle of vagina, discussed findings on internal assessment, pelvic stability, intimate mabel dilators  5/4: meditation/mindfullness for nervous system down regulation   5/11: dilator options  5/18: dilator training, frequency of dilation, handout provided with setup/cleanup/progression for dilators    HEP / HEP  Review Y  eval: quadruped diaphragmatic breathing, incorporating breathing throughout day   4/13: continue breathing, dilators   5/4: child's pose, happy baby, guided relaxation  5/11: pallof press, shoulder extension, flamingo  5/18: dilators  6/1: dilators for home release, cupping to lower abdomen     THER EX         81314   0 mins   pallof press n OTB; x20    Shoulder extension n BTB; x20    Flamingo n BTB; x20    Lunge into hip flexion n 10lb; x10    Half kneel chops n 10lb, x 15    Standing wrap arounds n 10lbs; 3 x 30sec                              NEURO RE-ED    82806   16 min    Diaphragmatic breathing n In quadruped, x10     Pec stretch y 2 min on foam roller    Thoracic extension  y On foam, x 20     Cat/camel y x20           Guided meditation  n Etelvina [place] Full Body Relaxation     MANUAL                 [id number]   30 mins   Joint Mobilization       Soft Tissue mobilizations External      Soft Tissue Mobilization Internal y Release to bilat STP, BC and IC    IASTM y Lower abdomen myofascial release

## 2023-06-15 ENCOUNTER — HOSPITAL ENCOUNTER (OUTPATIENT)
Dept: PHYSICAL THERAPY | Age: 23
Setting detail: THERAPIES SERIES
Discharge: HOME | End: 2023-06-15
Attending: NURSE PRACTITIONER
Payer: COMMERCIAL

## 2023-06-15 DIAGNOSIS — M62.89 PELVIC FLOOR DYSFUNCTION: Primary | ICD-10-CM

## 2023-06-15 PROCEDURE — 97140 MANUAL THERAPY 1/> REGIONS: CPT | Mod: GP

## 2023-06-15 PROCEDURE — 97530 THERAPEUTIC ACTIVITIES: CPT | Mod: GP

## 2023-06-15 PROCEDURE — 97110 THERAPEUTIC EXERCISES: CPT | Mod: GP

## 2023-06-15 NOTE — OP PT TREATMENT LOG
Mini is a 23 y/o cis gender female presenting to PFPT with c/o dyspareunia and pain with pelvic exams.  Mini went to the gynecologist about a few month ago, she has been having pain with intercourse and was referred to PFPT. She notes always having pain with insertion.     Bladder: Comment:        Urination frequency 6-7x a day   5/11: feeling the same    Urgency:  Can hold if she needs to, will sometimes have cramping since her IUD   Incontinence Denies    Pads denies   Nocturia denies   Pain 1x a night    Emptying Does not need to strain, feels like she fully   Prolapse symptoms  Denies    Liquid consumption Drinks about 3-4 refills of 24 oz water bottle, drinks about 3 cups of coffee, pre workout if she is going to the gym   UTI history When she was first sexually active she had 3 UTIs in a month, has not had a UTI recently          Bowel: Comment:        Frequency 2-3x a day, typically spread throughout the day, does not need to strain  5/11: currently on her period so frequency is not her normal    Urge Strong normal urge, can make it    Incontinence denies   Emptying Feels fully empty when she finishes   [place] stool Type 3-4   Fiber Will take fiber, green powder, drink more water and then will take laxatives. Will start taking supplements if she misses a day    Does meal prep, oatmeal or breakfast sandwich or eggs/veggies, potatoes or rice with chicken or turkey for lunch, dinner protein starch and veggie, does a lot protein, will snack on rice cakes and fruit or protein bars and yogurt   Management Fiber, green powder, water and laxatives  5/11: she reports not needing laxatives recently, but has been feeling more bloated on her period    Pain Had a hemorrhoid once about a few years ago, denies pain with BMs         OBGYN Comment:        Pregnancies 0   Births 0   Birth type n/a   Tears/ episiotomies n/a   Surgery No surgeries   Menstruation IUD has been lighter flow, consistent and regular, lots of  "cramping but has been less since her IUD. Prior heavier flow, was getting a lot of cramping     5/11: currently on her period, flow is heavier this month                    Sexual Activity Comment:        Type Penis vaginal    Pain Initial insertion will be pain, lasts about 2 minutes, feels it reduces after \"muscle relax,\" will have burning/tearing sensation     Does not notice a difference in pain based on position    Tried lubrication once or twice, will have burning but unsure if it was from initial insertion or the lubrication     History of bleeding post intercourse     Will have discomfort for about 5 min    Always has had pain with intercourse     At worst: 7/10   Post intercourse discomfort: 3-4/10     5/11: Less pain with certain positions, but pain is still present. [place] continues to be most painful period. Pain averages 6/10. Less discomfort following intercourse     6/15: she reports no pain with initial insertion of her partner now, but continues to notice discomfort with deeper penetration that averages a 7/10, post intercourse pain is less and only averages 1/10    Orgasm Yes    Masturbation Yes, denies pain   Libido Feels it is nonexistence, feels that it could be related to her pain with intercourse    Pain with speculum insertion          Pain Comment:        Low back pain Went for PT previously, has not been noticing back pain since                                Other Comment:        Physical activity  Go to the gym, will do lifting and cardio, walks outside/hiking, 3x a week typically   PLOF Previous history of dancing, did ballet and modern in college   Living environment Lives with her boyfriend, apartment    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 denies  Problems with stumbling or falling? Denies     Cauda equina questions:  Problems with bowel and bladder control? Specifically retention? See above  Pins and needles or numbness in the saddle area? Denies     Review of systems:  General - (chills, night sweats, recent infection, fever, weight loss/gain, unexplained night pain, excessive fatigue): Denies  Do you have a history of cancer? Denies  Gastrointestinal system - (abdominal pain, bowel changes, nausea, vomiting, bloating): Bloating that is inconsistent, might have it prior to her menstrual period   Cardiovascular system - (chest pain, palpitations, orthopnea, other): Denies  Respiratory system - (cough, SOB, sputum production, other): Denies  Musculoskeletal system: osteoporosis, vertebral fracture? Denies  Endocrine - (polyuria, polydipsia, heat or cold intolerance, other): Denies  Neurological - (numbness/tingling, falling/stumbling, HA, dizziness, diplopia, dysphagia/dysarthria, double vision, tinnitus, memory, drop attacks, other): Denies  Any long-term steroid use? Denies      Patient goals stop having pain with sexual activity     OBJECTIVE FINDINGS:      ASSESSMENT   PELVIS/POSTURE            LUMBAR AROM    Flexion    Extension    rotation    Sidebending        HIP ROM    ER    IR    Flexion    Extension    Ankle ROM    Lower Extremity Strength    hip flexion    hip extension    Hip IR    Hip ER    Hip abduction    Hip adduction    Knee    Ankle            SPECIAL TESTS    Hamstring length    Hip FADIR    Hip SCOUR    Trendelenburg    Slump test    SLR test    BAM            LUMBAR PALPATION    HIP PALPATION    OTHER PALPATION        SI JOINT TESTING     S/L Compression test    Thigh thrust test    Prone sacral thrust test    Gaenslen test        ABDOMEN    palpation Muscle guarding present and TTP reported with diaphragm palpation and lower abdomen on L>R side   Lower rib angle 90 degrees   Breathing pattern Min to moderate diaphragm  engagement with breathing   Diastasis    Skin/ integumentary        BALANCE    GAIT MECHANICS    RUNNING MECHANICS    SQUATTING MECHANICS    LUNGING MECHANICS    BED MOBILITY    SIT TO STAND      Verbal consent give for internal evaluation and treatment. Pt provides verbal consent for internal assessment     ASSESSMENT   EXTERNAL genital EXAM    PFM OBSERVATION    Skin integrity    contraction    relaxation    Bulge/pelvic drop    Cough    Light touch sensation    INTERNAL PFM exam Internal vaginal   Levator Ani Strength  4/5   Power    Endurance    Repetitions    Quick Flicks    Coordination Demonstrates appropriate lengthening and contraction        Palpation TTP with insertion, muscle guarding and TTP in bilat BC, STP and IC on L>R. Muscle guarding present in bilat BC, STP, and IC     5/11: less discomfort reported with initial insertion, muscle guarding present in bilat BC, STP and IC    6/15: targeted deep layer today, she reports 1-2/10 discomfort with bilat OI palpation, muscle guarding present in OI and PC bilat            TAILBONE                    TREATMENT LOG:  verbal consent for internal intervention: Pt provides verbal consent for internal assessment    Diagnosis  Pelvic Floor Dysfunction    Precautions       PT INITIAL EVALUATION:   4/6/23     PT PROGRESS NOTE:   5/11/23     Y/N Treatment Details: Time:   MODALITIES  16515   0 mins   Heat       Ice       THER ACT          52683   25 mins   OUTCOME MEASURES  PSFS: 40%    Falls Screen, Medication Review, VS, pain assessment Y      Pt Education:verbalized understanding of education and returned demonstration appropriately  Y Pelvic floor anatomy/ purpose function, POC, breathing pattern, abdominal tension influence on PFm, pain cycle of vagina   4/13: reviewed pain cycle of vagina, discussed findings on internal assessment, pelvic stability, intimate mabel dilators  5/4: meditation/mindfullness for nervous system down regulation   5/11: dilator  options  5/18: dilator training, frequency of dilation, handout provided with setup/cleanup/progression for dilators  6/15: stability training, breathing with exertion, findings on deep layer internal assessment     HEP / HEP Review Y  eval: quadruped diaphragmatic breathing, incorporating breathing throughout day   4/13: continue breathing, dilators   5/4: child's pose, happy baby, guided relaxation  5/11: pallof press, shoulder extension, flamingo  5/18: dilators  6/1: dilators for home release, cupping to lower abdomen   6/15: bird dog    THER EX         [id number]   22 mins   pallof press n OTB; x20    Shoulder extension n BTB; x20    Flamingo y BTB; x20    Lunge into hip flexion y 10lb; x10    Half kneel chops n 10lb, x 15    Standing wrap arounds n 10lbs; 3 x 30sec     PB rollout with march y x15 on 65 cm PB    Bird dog y x20                 NEURO RE-ED    79812   0 min    Diaphragmatic breathing n In quadruped, x10     Pec stretch n 2 min on foam roller    Thoracic extension  n On foam, x 20     Cat/camel n x20           Guided meditation  n Etelvina Jarvis Full Body Relaxation     MANUAL                 79205   10 mins   Joint Mobilization       Soft Tissue mobilizations External      Soft Tissue Mobilization Internal y Release to bilat STP, BC and IC    IASTM  Lower abdomen myofascial release

## 2023-06-17 NOTE — PROGRESS NOTES
Physical Therapy Progress Note    PT PROGRESS NOTE FOR OUTPATIENT THERAPY    Patient: Mini Hodges   MRN: [id number]  : 2000 22 y.o.    Referring Physician: System, Provider Not In  Date of Visit: 6/15/2023    Certification Dates: 23 through 23    Recommended Frequency & Duration:  1 time/week for up to 3 months        Diagnosis:   1. Pelvic floor dysfunction        Chief Complaints:  No chief complaint on file.      Precautions:   Existing Precautions/Restrictions: no known precautions/restrictions    TODAY'S VISIT:    Time In Session:  Start Time: 1403  Stop Time: 1500  Time Calculation (min): 57 min   General Information - 06/15/23 1407        Session Details    Document Type progress note     Mode of Treatment individual therapy        General Information    Referring Physician ABENA Talbert     History of present illness/functional impairment Mini is a 21 y/o cis gender female referred to PFPT by obgyn with c/o dyspareunia and pain with pelvic exams.     Patient/Family/Caregiver Comments/Observations Mini reports that she was able to use her dilators 3x last week and was able to use them 1x this week. She has been working on exercises at home. She is continuing to use dilator level 3. She notes improved awareness for holding tension and notes less     Existing Precautions/Restrictions no known precautions/restrictions                Daily Falls Screen - 06/15/23 1407        Daily Falls Assessment    Patient reported fall since last visit No                Pain/Vitals - 06/15/23 1407        Pain Assessment    Currently in pain No/Denies        Pre Activity Vital Signs    /70     BP Location Left upper arm     BP Method Manual     Patient Position Sitting                PT - 06/15/23 1407        Physical Therapy    Physical Therapy Specialty Pelvic Floor Program: Age 14+        PT Plan    Frequency of treatment 1 time/week     PT Duration 3 months     PT Cert From  04/06/23     PT Cert To 07/05/23     Date PT POC was sent to provider 04/06/23     Signed PT Plan of Care received?  Yes                Assessment and Plan - 06/17/23 0856        Assessment    Plan of Care reviewed and patient/family in agreement Yes     System Pathology/Pathophysiology Noted musculoskeletal;neuromuscular;genitourinary     Problem List abnormal muscle tone;decreased endurance;decreased strength;decreased flexibility;impaired coordination;pain     Clinical Assessment Mini has been attending PFPT for 8 visits. She continues to progress through her dilators with little to no pain reported. Mini reports improved insertional tolerance with less pain reported with initial insertion, she continues to have discomfort with deep palpation. She appears to have less muscle guarding throughout the superficial layer, muscle guarding and TTP reported with bilat OI and PC. She will benefit from continued skilled PT to reduce pain and improve PFm function.     Plan and Recommendations re-eval     Planned Services CPT 67958 Gait training;CPT 50397 Manual therapy;CPT 81143 Neuromuscular Reeducation;CPT 92905 Therapeutic exercises;CPT 50090 Therapeutic activities;CPT 93978 Self-care/Home management training                 OBJECTIVE MEASUREMENTS/DATA:    See treatment log    Outcome Measures        4/6/2023    08:59   PT SUBJECTIVE Outcome Measures   PSFS % Score 40 %       Today's Treatment::    Education provided:  Yes: See treatment log for details of education provided    Mini is a 21 y/o cis gender female presenting to PFPT with c/o dyspareunia and pain with pelvic exams.  Mini went to the gynecologist about a few month ago, she has been having pain with intercourse and was referred to PFPT. She notes always having pain with insertion.     Bladder: Comment:        Urination frequency 6-7x a day   5/11: feeling the same    Urgency:  Can hold if she needs to, will sometimes have cramping since her IUD  "  Incontinence Denies    Pads denies   Nocturia denies   Pain 1x a night    Emptying Does not need to strain, feels like she fully   Prolapse symptoms  Denies    Liquid consumption Drinks about 3-4 refills of 24 oz water bottle, drinks about 3 cups of coffee, pre workout if she is going to the gym   UTI history When she was first sexually active she had 3 UTIs in a month, has not had a UTI recently          Bowel: Comment:        Frequency 2-3x a day, typically spread throughout the day, does not need to strain  5/11: currently on her period so frequency is not her normal    Urge Strong normal urge, can make it    Incontinence denies   Emptying Feels fully empty when she finishes   [place] stool Type 3-4   Fiber Will take fiber, green powder, drink more water and then will take laxatives. Will start taking supplements if she misses a day    Does meal prep, oatmeal or breakfast sandwich or eggs/veggies, potatoes or rice with chicken or turkey for lunch, dinner protein starch and veggie, does a lot protein, will snack on rice cakes and fruit or protein bars and yogurt   Management Fiber, green powder, water and laxatives  5/11: she reports not needing laxatives recently, but has been feeling more bloated on her period    Pain Had a hemorrhoid once about a few years ago, denies pain with BMs         OBGYN Comment:        Pregnancies 0   Births 0   Birth type n/a   Tears/ episiotomies n/a   Surgery No surgeries   Menstruation IUD has been lighter flow, consistent and regular, lots of cramping but has been less since her IUD. Prior heavier flow, was getting a lot of cramping     5/11: currently on her period, flow is heavier this month                    Sexual Activity Comment:        Type Penis vaginal    Pain Initial insertion will be pain, lasts about 2 minutes, feels it reduces after \"muscle relax,\" will have burning/tearing sensation     Does not notice a difference in pain based on position    Tried lubrication " once or twice, will have burning but unsure if it was from initial insertion or the lubrication     History of bleeding post intercourse     Will have discomfort for about 5 min    Always has had pain with intercourse     At worst: 7/10   Post intercourse discomfort: 3-4/10     5/11: Less pain with certain positions, but pain is still present. [place] continues to be most painful period. Pain averages 6/10. Less discomfort following intercourse     6/15: she reports no pain with initial insertion of her partner now, but continues to notice discomfort with deeper penetration that averages a 7/10, post intercourse pain is less and only averages 1/10    Orgasm Yes    Masturbation Yes, denies pain   Libido Feels it is nonexistence, feels that it could be related to her pain with intercourse    Pain with speculum insertion          Pain Comment:        Low back pain Went for PT previously, has not been noticing back pain since                                Other Comment:        Physical activity  Go to the gym, will do lifting and cardio, walks outside/hiking, 3x a week typically   PLOF Previous history of dancing, did ballet and modern in college   Living environment Lives with her boyfriend, apartment    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 denies  Problems with stumbling or falling? Denies     Cauda equina questions:  Problems with bowel and bladder control? Specifically retention? See above  Pins and needles or numbness in the saddle area? Denies     Review of systems:  General - (chills, night sweats, recent infection, fever, weight loss/gain, unexplained night pain, excessive fatigue): Denies  Do you have a history of cancer? Denies  Gastrointestinal system -  (abdominal pain, bowel changes, nausea, vomiting, bloating): Bloating that is inconsistent, might have it prior to her menstrual period   Cardiovascular system - (chest pain, palpitations, orthopnea, other): Denies  Respiratory system - (cough, SOB, sputum production, other): Denies  Musculoskeletal system: osteoporosis, vertebral fracture? Denies  Endocrine - (polyuria, polydipsia, heat or cold intolerance, other): Denies  Neurological - (numbness/tingling, falling/stumbling, HA, dizziness, diplopia, dysphagia/dysarthria, double vision, tinnitus, memory, drop attacks, other): Denies  Any long-term steroid use? Denies      Patient goals stop having pain with sexual activity     OBJECTIVE FINDINGS:      ASSESSMENT   PELVIS/POSTURE            LUMBAR AROM    Flexion    Extension    rotation    Sidebending        HIP ROM    ER    IR    Flexion    Extension    Ankle ROM    Lower Extremity Strength    hip flexion    hip extension    Hip IR    Hip ER    Hip abduction    Hip adduction    Knee    Ankle            SPECIAL TESTS    Hamstring length    Hip FADIR    Hip SCOUR    Trendelenburg    Slump test    SLR test    BAM            LUMBAR PALPATION    HIP PALPATION    OTHER PALPATION        SI JOINT TESTING     S/L Compression test    Thigh thrust test    Prone sacral thrust test    Gaenslen test        ABDOMEN    palpation Muscle guarding present and TTP reported with diaphragm palpation and lower abdomen on L>R side   Lower rib angle 90 degrees   Breathing pattern Min to moderate diaphragm engagement with breathing   Diastasis    Skin/ integumentary        BALANCE    GAIT MECHANICS    RUNNING MECHANICS    SQUATTING MECHANICS    LUNGING MECHANICS    BED MOBILITY    SIT TO STAND      Verbal consent give for internal evaluation and treatment. Pt provides verbal consent for internal assessment     ASSESSMENT   EXTERNAL genital EXAM    PFM OBSERVATION    Skin integrity    contraction    relaxation    Bulge/pelvic drop     Cough    Light touch sensation    INTERNAL PFM exam Internal vaginal   Levator Ani Strength  4/5   Power    Endurance    Repetitions    Quick Flicks    Coordination Demonstrates appropriate lengthening and contraction        Palpation TTP with insertion, muscle guarding and TTP in bilat BC, STP and IC on L>R. Muscle guarding present in bilat BC, STP, and IC     5/11: less discomfort reported with initial insertion, muscle guarding present in bilat BC, STP and IC    6/15: targeted deep layer today, she reports 1-2/10 discomfort with bilat OI palpation, muscle guarding present in OI and PC bilat            TAILBONE                    TREATMENT LOG:  verbal consent for internal intervention: Pt provides verbal consent for internal assessment    Diagnosis  Pelvic Floor Dysfunction    Precautions       PT INITIAL EVALUATION:   4/6/23     PT PROGRESS NOTE:   5/11/23     Y/N Treatment Details: Time:   MODALITIES  10711   0 mins   Heat       Ice       THER ACT          92633   25 mins   OUTCOME MEASURES  PSFS: 40%    Falls Screen, Medication Review, VS, pain assessment Y      Pt Education:verbalized understanding of education and returned demonstration appropriately  Y Pelvic floor anatomy/ purpose function, POC, breathing pattern, abdominal tension influence on PFm, pain cycle of vagina   4/13: reviewed pain cycle of vagina, discussed findings on internal assessment, pelvic stability, intimate mabel dilators  5/4: meditation/mindfullness for nervous system down regulation   5/11: dilator options  5/18: dilator training, frequency of dilation, handout provided with setup/cleanup/progression for dilators  6/15: stability training, breathing with exertion, findings on deep layer internal assessment     HEP / HEP Review Y  eval: quadruped diaphragmatic breathing, incorporating breathing throughout day   4/13: continue breathing, dilators   5/4: child's pose, happy baby, guided relaxation  5/11: pallof press, shoulder  extension, flamingo  5/18: dilators  6/1: dilators for home release, cupping to lower abdomen   6/15: bird dog    THER EX         40906   22 mins   pallof press n OTB; x20    Shoulder extension n BTB; x20    Flamingo y BTB; x20    Lunge into hip flexion y 10lb; x10    Half kneel chops n 10lb, x 15    Standing wrap arounds n 10lbs; 3 x 30sec     PB rollout with march y x15 on 65 cm PB    Bird dog y x20                 NEURO RE-ED    85999   0 min    Diaphragmatic breathing n In quadruped, x10     Pec stretch n 2 min on foam roller    Thoracic extension  n On foam, x 20     Cat/camel n x20           Guided meditation  n Etelvina [place] Full Body Relaxation     MANUAL                 36503   10 mins   Joint Mobilization       Soft Tissue mobilizations External      Soft Tissue Mobilization Internal y Release to bilat STP, BC and IC    IASTM  Lower abdomen myofascial release                    Goals Addressed                    This Visit's Progress    •  Mutually agreed upon pain goal         Mutually agreed upon pain goal: 0/10    5/11: 6/10    6/15: 7/10 with deeper penetration       •  pelvic goals (pt-stated)         In 8 weeks Mini will:     Be (I) with pelvic floor contraction/ relaxation/ bearing down in order to improve normal bowel and bladder function. MET    (I) with diaphragmatic breathing for improved Pfm tissue mobility MET    be able to tolerate digital palpation of pelvic floor as well as digital insertion and removal with pain reduce by 30% in order to tolerate pelvic exams. MET    improve PSFS by 30% in order to improve QOL as well as pelvic floor function. Not assessed today    In 16 weeks Mini will:   (I) with HEP in order to maintain and aide in gains made with skilled PT services Progressing    (I) with toilet posture and bowel management in order to improve bowel function- MET    Increase fiber intake by 10-15 grams in order to improve bowel function. MET    be able to tolerate digital  palpation of pelvic floor as well as digital insertion and removal with pain reduce by 80% in order to tolerate pelvic exams.  Progressing    improve PSFS by 80% in order to improve QOL as well as pelvic floor function. Not assessed today    be (I) with self abdominal myofascial massage to decrease abdominal pain and dysfunction. MET    Improve gluteal strength by 1/2 MMT GEO in order to decrease demand on pelvic floor and improve overall functional mobility. Progressing    Reduced TTP and fascial restrictions in abdomen by 50% in order to improve pelvic floor function and QOL. Progressing    Return to pelvic exams without signs/ symptoms of pelvic floor dysfunction. Progressing        •  stop having pain with sexual activity (pt-stated)         5/11: 15% improvement since starting PT    6/15: she feels she has made a 45% improvement since starting PT            Andree Kim, PT

## 2023-06-29 ENCOUNTER — HOSPITAL ENCOUNTER (OUTPATIENT)
Dept: PHYSICAL THERAPY | Age: 23
Setting detail: THERAPIES SERIES
Discharge: HOME | End: 2023-06-29
Attending: NURSE PRACTITIONER
Payer: COMMERCIAL

## 2023-06-29 DIAGNOSIS — M62.89 PELVIC FLOOR DYSFUNCTION: Primary | ICD-10-CM

## 2023-06-29 PROCEDURE — 97140 MANUAL THERAPY 1/> REGIONS: CPT | Mod: GP

## 2023-06-29 PROCEDURE — 97530 THERAPEUTIC ACTIVITIES: CPT | Mod: GP

## 2023-06-29 PROCEDURE — 97110 THERAPEUTIC EXERCISES: CPT | Mod: GP

## 2023-06-29 NOTE — LETTER
[address] [facility] [address]      Dear DR. Farrell,    Thank you for this referral. Please review the attached notes and plan of care for your approval.  Please contact our department with any questions.     Sincerely,     Andree Kim, PT    By co-signing this Plan of Care (POC) you agree to the following:  I have reviewed the the Plan of Care established by the therapist within this document and certify that the services are skilled and medically necessary. I have reviewed the plan and recommend that these services continue to meet the goals stated in this document.    PHYSICIAN SIGNATURE: __________________________________     DATE: ___________________  TIME: _____________           Physical Therapy Plan of Care 23   Effective from: 2023  Effective to: 2023    Plan ID: [id number]            Participants as of 2023    Name Type Comments Contact Info    Andree Kim, PT Physical Therapist      ABENA Talbert Referring Physician  [phone]       Last Progress Notes Note     Author: Andree Kim PT Status: Signed Last edited: 2023  2:00 PM         Physical Therapy Progress Note    [facility] Fax: [phone]    PT RE-EVALUATION FOR OUTPATIENT THERAPY    Patient: Mini Hodges     MRN: [id number]  : 2000 22 y.o.    Referring Physician: System, Provider Not In  Date of Visit: 2023    New Certification Dates: 23 through 23    Recommended Frequency & Duration:  1 time/week for up to 3 months        Diagnosis:   1. Pelvic floor dysfunction        Chief Complaints:  No chief complaint on file.      Precautions:   Existing Precautions/Restrictions: no known precautions/restrictions    TODAY'S VISIT:    Time In Session:  Start Time: 1403  Stop Time: 1500  Time Calculation (min): 57 min   General Information - 23 1403        Session Details    Document Type re-evaluation     Mode of Treatment individual therapy        General  Information    Referring Physician ABENA Talbert     History of present illness/functional impairment Mini is a 23 y/o cis gender female referred to PFPT by obgyn with c/o dyspareunia and pain with pelvic exams.     Patient/Family/Caregiver Comments/Observations Mini reports that she has been consistent with her exercises at home. She reports only being able to use dilators a few times since last, having less pain with her dilators. She is currently using dilator level 3.     Existing Precautions/Restrictions no known precautions/restrictions                Daily Falls Screen - 06/29/23 1831        Daily Falls Assessment    Patient reported fall since last visit No                Pain/Vitals - 06/29/23 1403        Pain Assessment    Currently in pain No/Denies        Pre Activity Vital Signs    /74     BP Location Left upper arm     BP Method Manual     Patient Position Sitting                PT - 06/29/23 1831        Physical Therapy    Physical Therapy Specialty Pelvic Floor Program: Age 14+        PT Plan    Frequency of treatment 1 time/week     PT Duration 3 months     PT Cert From 06/29/23     PT Cert To 09/27/23     Date PT POC was sent to provider 06/29/23     Signed PT Plan of Care received?  No                Assessment and Plan - 06/29/23 1831        Assessment    Plan of Care reviewed and patient/family in agreement Yes     System Pathology/Pathophysiology Noted musculoskeletal;neuromuscular;genitourinary     Problem List abnormal muscle tone;decreased endurance;decreased strength;decreased flexibility;impaired coordination;pain     Clinical Assessment Mini has been attending PFPT for 9 visits, she continues to reportpain with deeper penetration when sexually active with her partner, no longer reports pain with initial insertion. She appears to have less guarding present in bilat BC and STP, continues to have guarding present in bilat OI and PC. Mini has progressed to level 3 in  her dilator set. She appears to have less guarding present in bilat UQs and demonstrates improve diaphragm engagement with breathing. She demonstrates improved PSFS score from 40% to 55%. She notes a 50% improvement since starting PT. Mini will benefit from continued skilled PT to reduce pain and to improve function of PFm.     Plan and Recommendations biweekly 6-8 visits     Planned Services CPT 73578 Gait training;CPT 21032 Manual therapy;CPT 20131 Neuromuscular Reeducation;CPT 82843 Therapeutic activities;CPT 09315 Therapeutic exercises;CPT 47466 Self-care/Home management training                 OBJECTIVE MEASUREMENTS/DATA:     Outcome Measures    PT Outcome Measures - 06/30/23 0629        NEW (2/6/23):  PSFS     PSFS ACTIVITY 1 Sexual activity     PSFS ANSWER 1 5     PSFS ACTIVITY 2 Gynecology exams     PSFS ANSWER 2 6     PSFS Sum of Activity Scores 11     PSFS Number of Activities 2     PSFS Percent Score 55 %                   Outcome Measures        4/6/2023    08:59 6/30/2023    06:29   PT SUBJECTIVE Outcome Measures   PSFS % Score 40 % 55 %       Today's Treatment::    Mini is a 23 y/o cis gender female presenting to PFPT with c/o dyspareunia and pain with pelvic exams.  Mini went to the gynecologist about a few month ago, she has been having pain with intercourse and was referred to PFPT. She notes always having pain with insertion.     Bladder: Comment:        Urination frequency 6-7x a day   5/11: feeling the same   6/29: 6-7x    Urgency:  Can hold if she needs to, will sometimes have cramping since her IUD   Incontinence Denies    Pads denies   Nocturia 1x a night    Pain denies   Emptying Does not need to strain, feels like she fully empties  6/29: still fully emptying   Prolapse symptoms  Denies    Liquid consumption Drinks about 3-4 refills of 24 oz water bottle, drinks about 3 cups of coffee, pre workout if she is going to the gym   UTI history When she was first sexually active she had 3  "UTIs in a month, has not had a UTI recently          Bowel: Comment:        Frequency 2-3x a day, typically spread throughout the day, does not need to strain  5/11: currently on her period so frequency is not her normal   6/29: 2 x a day, 1x a day when she is not working out in the morning    Urge Strong normal urge, can make it    Incontinence denies   Emptying Feels fully empty when she finishes   [place] stool Type 3-4   Fiber Will take fiber, green powder, drink more water and then will take laxatives. Will start taking supplements if she misses a day    Does meal prep, oatmeal or breakfast sandwich or eggs/veggies, potatoes or rice with chicken or turkey for lunch, dinner protein starch and veggie, does a lot protein, will snack on rice cakes and fruit or protein bars and yogurt   Management Fiber, green powder, water and laxatives  5/11: she reports not needing laxatives recently, but has been feeling more bloated on her period   6/29: not needing laxatives recently, having less bloating regularly with probiotics, still feeling bloated around ovulation   Pain Had a hemorrhoid once about a few years ago, denies pain with BMs         OBGYN Comment:        Pregnancies 0   Births 0   Birth type n/a   Tears/ episiotomies n/a   Surgery No surgeries   Menstruation IUD has been lighter flow, consistent and regular, lots of cramping but has been less since her IUD. Prior heavier flow, was getting a lot of cramping     5/11: currently on her period, flow is heavier this month                    Sexual Activity Comment:        Type Penis vaginal    Pain Initial insertion will be pain, lasts about 2 minutes, feels it reduces after \"muscle relax,\" will have burning/tearing sensation     Does not notice a difference in pain based on position    Tried lubrication once or twice, will have burning but unsure if it was from initial insertion or the lubrication     History of bleeding post intercourse     Will have discomfort " for about 5 min    Always has had pain with intercourse     At worst: 7/10   Post intercourse discomfort: 3-4/10     5/11: Less pain with certain positions, but pain is still present. [place] continues to be most painful period. Pain averages 6/10. Less discomfort following intercourse     6/15: she reports no pain with initial insertion of her partner now, but continues to notice discomfort with deeper penetration that averages a 7/10, post intercourse pain is less and only averages 1/10     6/29: Still having pain with deep penetration, little to no pain with initial insertion    Orgasm Yes    Masturbation Yes, denies pain   Libido Feels it is nonexistence, feels that it could be related to her pain with intercourse    Pain with speculum insertion          Pain Comment:        Low back pain Went for PT previously, has not been noticing back pain since                                Other Comment:        Physical activity  Go to the gym, will do lifting and cardio, walks outside/hiking, 3x a week typically   PLOF Previous history of dancing, did ballet and modern in college   Living environment Lives with her boyfriend, apartment    Other Works in coffee shop part time, part time working in the office for [employer]    Stress management Going to the gym, spending time with partner and watching TV             Systems Review:   Cord questions:  Pins and needles or tingling in both arms and both legs at the same time? denies  Problems with stumbling or falling? Denies     Cauda equina questions:  Problems with bowel and bladder control? Specifically retention? See above  Pins and needles or numbness in the saddle area? Denies     Review of systems:  General - (chills, night sweats, recent infection, fever, weight loss/gain, unexplained night pain, excessive fatigue): Denies  Do you have a history of cancer? Denies  Gastrointestinal system - (abdominal pain, bowel changes, nausea, vomiting, bloating): Bloating that is  inconsistent, might have it prior to her menstrual period   Cardiovascular system - (chest pain, palpitations, orthopnea, other): Denies  Respiratory system - (cough, SOB, sputum production, other): Denies  Musculoskeletal system: osteoporosis, vertebral fracture? Denies  Endocrine - (polyuria, polydipsia, heat or cold intolerance, other): Denies  Neurological - (numbness/tingling, falling/stumbling, HA, dizziness, diplopia, dysphagia/dysarthria, double vision, tinnitus, memory, drop attacks, other): Denies  Any long-term steroid use? Denies      Patient goals stop having pain with sexual activity     OBJECTIVE FINDINGS:      ASSESSMENT   PELVIS/POSTURE            LUMBAR AROM    Flexion    Extension    rotation    Sidebending        HIP ROM    ER 6/29: WFL   IR 6/29: WFL   Flexion 6/29: WFL   Extension 6/29: WFL   Ankle ROM    Lower Extremity Strength    hip flexion 6/29: B 5/5   hip extension 6/29: R 5/5 L 4+/5   Hip IR 6/29: R 5/5 L 4+/5   Hip ER 6/29: B 5/5   Hip abduction 6/29: B 5/5   Hip adduction 6/29: B 5/5   Knee    Ankle            SPECIAL TESTS    Hamstring length    Hip FADIR    Hip SCOUR    Trendelenburg    Slump test    SLR test    BAM            LUMBAR PALPATION    HIP PALPATION    OTHER PALPATION        SI JOINT TESTING     S/L Compression test    Thigh thrust test    Prone sacral thrust test    Gaenslen test        ABDOMEN    palpation Muscle guarding present and TTP reported with diaphragm palpation and lower abdomen on L>R side    6/29: TTP reported at midline in diaphragm    Lower rib angle 90 degrees   Breathing pattern Min to moderate diaphragm engagement with breathing  6/29: diaphragm engaged with breathing    Diastasis    Skin/ integumentary        BALANCE    GAIT MECHANICS    RUNNING MECHANICS    SQUATTING MECHANICS    LUNGING MECHANICS    BED MOBILITY    SIT TO STAND      Verbal consent give for internal evaluation and treatment. Pt provides verbal consent for internal assessment      ASSESSMENT   EXTERNAL genital EXAM    PFM OBSERVATION    Skin integrity    contraction    relaxation    Bulge/pelvic drop    Cough    Light touch sensation    INTERNAL PFM exam Internal vaginal   Levator Ani Strength  4/5   Power    Endurance    Repetitions    Quick Flicks    Coordination Demonstrates appropriate lengthening and contraction        Palpation TTP with insertion, muscle guarding and TTP in bilat BC, STP and IC on L>R. Muscle guarding present in bilat BC, STP, and IC     5/11: less discomfort reported with initial insertion, muscle guarding present in bilat BC, STP and IC    6/15: targeted deep layer today, she reports 1-2/10 discomfort with bilat OI palpation, muscle guarding present in OI and PC bilat     6/29: No TTP reported with initial insertion, muscle guarding present in bilat OI and PC           TAILBONE                    TREATMENT LOG:  verbal consent for internal intervention: Pt provides verbal consent for internal assessment    Diagnosis  Pelvic Floor Dysfunction    Precautions       PT INITIAL EVALUATION:   4/6/23     PT PROGRESS NOTE:   5/11/23  Re-eval:6/29/23     Y/N Treatment Details: Time:   MODALITIES  37709   0 mins   Heat       Ice       THER ACT          03811   25 mins   OUTCOME MEASURES Y PSFS: 40%  6/29: 55%    Falls Screen, Medication Review, VS, pain assessment Y      Pt Education:verbalized understanding of education and returned demonstration appropriately  Y Pelvic floor anatomy/ purpose function, POC, breathing pattern, abdominal tension influence on PFm, pain cycle of vagina   4/13: reviewed pain cycle of vagina, discussed findings on internal assessment, pelvic stability, intimate mabel dilators  5/4: meditation/mindfullness for nervous system down regulation   5/11: dilator options  5/18: dilator training, frequency of dilation, handout provided with setup/cleanup/progression for dilators  6/15: stability training, breathing with exertion, findings on deep layer  internal assessment   6/29: progress made so far, plan for future sessions, reviewed progression of dilators     HEP / HEP Review Y  eval: quadruped diaphragmatic breathing, incorporating breathing throughout day   4/13: continue breathing, dilators   5/4: child's pose, happy baby, guided relaxation  5/11: pallof press, shoulder extension, flamingo  5/18: dilators  6/1: dilators for home release, cupping to lower abdomen   6/15: bird dog  6/29: SL foam roller     THER EX         13233   15 mins   pallof press n OTB; x20    Shoulder extension n BTB; x20    Flamingo n BTB; x20    Lunge into hip flexion y 10lb; x10    Half kneel chops n 10lb, x 15    Standing wrap arounds n 10lbs; 3 x 30sec     PB rollout with march n x15 on 65 cm PB    Bird dog n x20    SL foam roller step over y x15    SL foam roller RDL y 9lb kettle bell, x15     Squat into hip flexion y 9lb kettle bell, x 15                 NEURO RE-ED    17468   0 min    Diaphragmatic breathing n In quadruped, x10     Pec stretch n 2 min on foam roller    Thoracic extension  n On foam, x 20     Cat/camel n x20           Guided meditation  n Etelvina Jarvis Full Body Relaxation     MANUAL                 21230   12 mins   Joint Mobilization       Soft Tissue mobilizations External      Soft Tissue Mobilization Internal y Release to bilat STP, BC and IC    IASTM  Lower abdomen myofascial release                   Goals:   Goals     •  Mutually agreed upon pain goal       Mutually agreed upon pain goal: 0/10    5/11: 6/10    6/15: 7/10 with deeper penetration     6/29: 7/10 pain with deep penetration       •  pelvic goals (pt-stated)       In 8 weeks Mini will:     Be (I) with pelvic floor contraction/ relaxation/ bearing down in order to improve normal bowel and bladder function. MET    (I) with diaphragmatic breathing for improved Pfm tissue mobility MET    be able to tolerate digital palpation of pelvic floor as well as digital insertion and removal with pain  reduce by 30% in order to tolerate pelvic exams. MET    improve PSFS by 30% in order to improve QOL as well as pelvic floor function. Progressing, PSFS improves from 40% to 55%    In 16 weeks Mini will:   (I) with HEP in order to maintain and aide in gains made with skilled PT services Progressing    (I) with toilet posture and bowel management in order to improve bowel function- MET    Increase fiber intake by 10-15 grams in order to improve bowel function. MET    be able to tolerate digital palpation of pelvic floor as well as digital insertion and removal with pain reduce by 80% in order to tolerate pelvic exams.  Progressing    improve PSFS by 80% in order to improve QOL as well as pelvic floor function. Progressing, PSFS improves from 40% to 55%    be (I) with self abdominal myofascial massage to decrease abdominal pain and dysfunction. MET    Improve gluteal strength by 1/2 MMT GEO in order to decrease demand on pelvic floor and improve overall functional mobility. Progressing    Reduced TTP and fascial restrictions in abdomen by 50% in order to improve pelvic floor function and QOL. Progressing    Return to pelvic exams without signs/ symptoms of pelvic floor dysfunction. Progressing        •  stop having pain with sexual activity (pt-stated)       5/11: 15% improvement since starting PT    6/15: she feels she has made a 45% improvement since starting PT    6/29: 50% improvement since starting PT, not having pain with initial insertion, still feeling limited in positions secondary to pain              This 22 y.o. year old female presents to PT with above stated diagnosis. Physical Therapy evaluation reveals abnormal muscle tone, decreased endurance, decreased strength, decreased flexibility, impaired coordination, pain resulting in   limitations. Mini Hodges will benefit from skilled PT services to address limitation, work towards rehab and patient goals and maximize PLOF of chosen ADLs.     Planned  Services: The patient's treatment will include CPT 20598 Gait training, CPT 00237 Manual therapy, CPT 20971 Neuromuscular Reeducation, CPT 80952 Therapeutic activities, CPT 70193 Therapeutic exercises, CPT 26708 Self-care/Home management training, .     Andree Kim, PT                       Current Participants as of 6/30/2023    Name Type Comments Contact Info    Andree Kim, PT Physical Therapist      Signature pending    ABENA Talbert Referring Physician  [phone]    Signature pending

## 2023-06-29 NOTE — OP PT TREATMENT LOG
Mini is a 23 y/o cis gender female presenting to PFPT with c/o dyspareunia and pain with pelvic exams.  Mini went to the gynecologist about a few month ago, she has been having pain with intercourse and was referred to PFPT. She notes always having pain with insertion.     Bladder: Comment:        Urination frequency 6-7x a day   5/11: feeling the same   6/29: 6-7x    Urgency:  Can hold if she needs to, will sometimes have cramping since her IUD   Incontinence Denies    Pads denies   Nocturia 1x a night    Pain denies   Emptying Does not need to strain, feels like she fully empties  6/29: still fully emptying   Prolapse symptoms  Denies    Liquid consumption Drinks about 3-4 refills of 24 oz water bottle, drinks about 3 cups of coffee, pre workout if she is going to the gym   UTI history When she was first sexually active she had 3 UTIs in a month, has not had a UTI recently          Bowel: Comment:        Frequency 2-3x a day, typically spread throughout the day, does not need to strain  5/11: currently on her period so frequency is not her normal   6/29: 2 x a day, 1x a day when she is not working out in the morning    Urge Strong normal urge, can make it    Incontinence denies   Emptying Feels fully empty when she finishes   [place] stool Type 3-4   Fiber Will take fiber, green powder, drink more water and then will take laxatives. Will start taking supplements if she misses a day    Does meal prep, oatmeal or breakfast sandwich or eggs/veggies, potatoes or rice with chicken or turkey for lunch, dinner protein starch and veggie, does a lot protein, will snack on rice cakes and fruit or protein bars and yogurt   Management Fiber, green powder, water and laxatives  5/11: she reports not needing laxatives recently, but has been feeling more bloated on her period   6/29: not needing laxatives recently, having less bloating regularly with probiotics, still feeling bloated around ovulation   Pain Had a hemorrhoid  "once about a few years ago, denies pain with BMs         OBGYN Comment:        Pregnancies 0   Births 0   Birth type n/a   Tears/ episiotomies n/a   Surgery No surgeries   Menstruation IUD has been lighter flow, consistent and regular, lots of cramping but has been less since her IUD. Prior heavier flow, was getting a lot of cramping     5/11: currently on her period, flow is heavier this month                    Sexual Activity Comment:        Type Penis vaginal    Pain Initial insertion will be pain, lasts about 2 minutes, feels it reduces after \"muscle relax,\" will have burning/tearing sensation     Does not notice a difference in pain based on position    Tried lubrication once or twice, will have burning but unsure if it was from initial insertion or the lubrication     History of bleeding post intercourse     Will have discomfort for about 5 min    Always has had pain with intercourse     At worst: 7/10   Post intercourse discomfort: 3-4/10     5/11: Less pain with certain positions, but pain is still present. [place] continues to be most painful period. Pain averages 6/10. Less discomfort following intercourse     6/15: she reports no pain with initial insertion of her partner now, but continues to notice discomfort with deeper penetration that averages a 7/10, post intercourse pain is less and only averages 1/10     6/29: Still having pain with deep penetration, little to no pain with initial insertion    Orgasm Yes    Masturbation Yes, denies pain   Libido Feels it is nonexistence, feels that it could be related to her pain with intercourse    Pain with speculum insertion          Pain Comment:        Low back pain Went for PT previously, has not been noticing back pain since                                Other Comment:        Physical activity  Go to the gym, will do lifting and cardio, walks outside/hiking, 3x a week typically   PLOF Previous history of dancing, did ballet and modern in college "   Living environment Lives with her boyfriend, apartment    Other Works in coffee shop part time, part time working in the office for [employer]    Stress management Going to the gym, spending time with partner and watching TV             Systems Review:   Cord questions:  Pins and needles or tingling in both arms and both legs at the same time? denies  Problems with stumbling or falling? Denies     Cauda equina questions:  Problems with bowel and bladder control? Specifically retention? See above  Pins and needles or numbness in the saddle area? Denies     Review of systems:  General - (chills, night sweats, recent infection, fever, weight loss/gain, unexplained night pain, excessive fatigue): Denies  Do you have a history of cancer? Denies  Gastrointestinal system - (abdominal pain, bowel changes, nausea, vomiting, bloating): Bloating that is inconsistent, might have it prior to her menstrual period   Cardiovascular system - (chest pain, palpitations, orthopnea, other): Denies  Respiratory system - (cough, SOB, sputum production, other): Denies  Musculoskeletal system: osteoporosis, vertebral fracture? Denies  Endocrine - (polyuria, polydipsia, heat or cold intolerance, other): Denies  Neurological - (numbness/tingling, falling/stumbling, HA, dizziness, diplopia, dysphagia/dysarthria, double vision, tinnitus, memory, drop attacks, other): Denies  Any long-term steroid use? Denies      Patient goals stop having pain with sexual activity     OBJECTIVE FINDINGS:      ASSESSMENT   PELVIS/POSTURE            LUMBAR AROM    Flexion    Extension    rotation    Sidebending        HIP ROM    ER 6/29: WFL   IR 6/29: WFL   Flexion 6/29: WFL   Extension 6/29: WFL   Ankle ROM    Lower Extremity Strength    hip flexion 6/29: B 5/5   hip extension 6/29: R 5/5 L 4+/5   Hip IR 6/29: R 5/5 L 4+/5   Hip ER 6/29: B 5/5   Hip abduction 6/29: B 5/5   Hip adduction 6/29: B 5/5   Knee    Ankle            SPECIAL TESTS    Hamstring length     Hip FADIR    Hip SCOUR    Trendelenburg    Slump test    SLR test    BAM            LUMBAR PALPATION    HIP PALPATION    OTHER PALPATION        SI JOINT TESTING     S/L Compression test    Thigh thrust test    Prone sacral thrust test    Gaenslen test        ABDOMEN    palpation Muscle guarding present and TTP reported with diaphragm palpation and lower abdomen on L>R side    6/29: TTP reported at midline in diaphragm    Lower rib angle 90 degrees   Breathing pattern Min to moderate diaphragm engagement with breathing  6/29: diaphragm engaged with breathing    Diastasis    Skin/ integumentary        BALANCE    GAIT MECHANICS    RUNNING MECHANICS    SQUATTING MECHANICS    LUNGING MECHANICS    BED MOBILITY    SIT TO STAND      Verbal consent give for internal evaluation and treatment. Pt provides verbal consent for internal assessment     ASSESSMENT   EXTERNAL genital EXAM    PFM OBSERVATION    Skin integrity    contraction    relaxation    Bulge/pelvic drop    Cough    Light touch sensation    INTERNAL PFM exam Internal vaginal   Levator Ani Strength  4/5   Power    Endurance    Repetitions    Quick Flicks    Coordination Demonstrates appropriate lengthening and contraction        Palpation TTP with insertion, muscle guarding and TTP in bilat BC, STP and IC on L>R. Muscle guarding present in bilat BC, STP, and IC     5/11: less discomfort reported with initial insertion, muscle guarding present in bilat BC, STP and IC    6/15: targeted deep layer today, she reports 1-2/10 discomfort with bilat OI palpation, muscle guarding present in OI and PC bilat     6/29: No TTP reported with initial insertion, muscle guarding present in bilat OI and PC           TAILBONE                    TREATMENT LOG:  verbal consent for internal intervention: Pt provides verbal consent for internal assessment    Diagnosis  Pelvic Floor Dysfunction    Precautions       PT INITIAL EVALUATION:   4/6/23     PT PROGRESS NOTE:    5/11/23  Re-eval:6/29/23     Y/N Treatment Details: Time:   MODALITIES  90349   0 mins   Heat       Ice       THER ACT          33285   25 mins   OUTCOME MEASURES Y PSFS: 40%  6/29: 55%    Falls Screen, Medication Review, VS, pain assessment Y      Pt Education:verbalized understanding of education and returned demonstration appropriately  Y Pelvic floor anatomy/ purpose function, POC, breathing pattern, abdominal tension influence on PFm, pain cycle of vagina   4/13: reviewed pain cycle of vagina, discussed findings on internal assessment, pelvic stability, intimate mabel dilators  5/4: meditation/mindfullness for nervous system down regulation   5/11: dilator options  5/18: dilator training, frequency of dilation, handout provided with setup/cleanup/progression for dilators  6/15: stability training, breathing with exertion, findings on deep layer internal assessment   6/29: progress made so far, plan for future sessions, reviewed progression of dilators     HEP / HEP Review Y  eval: quadruped diaphragmatic breathing, incorporating breathing throughout day   4/13: continue breathing, dilators   5/4: child's pose, happy baby, guided relaxation  5/11: pallof press, shoulder extension, flamingo  5/18: dilators  6/1: dilators for home release, cupping to lower abdomen   6/15: bird dog  6/29: SL foam roller     THER EX         69696   15 mins   pallof press n OTB; x20    Shoulder extension n BTB; x20    [place]lamingo n BTB; x20    Lunge into hip flexion y 10lb; x10    Half kneel chops n 10lb, x 15    Standing wrap arounds n 10lbs; 3 x 30sec     PB rollout with march n x15 on 65 cm PB    Bird dog n x20    SL foam roller step over y x15    SL foam roller RDL y 9lb kettle bell, x15     Squat into hip flexion y 9lb kettle bell, x 15                 NEURO RE-ED    12119   0 min    Diaphragmatic breathing n In quadruped, x10     Pec stretch n 2 min on foam roller    Thoracic extension  n On foam, x 20     Cat/camel n x20            Guided meditation  madeleine Conley Full Body Relaxation     MANUAL                 92076   12 mins   Joint Mobilization       Soft Tissue mobilizations External      Soft Tissue Mobilization Internal y Release to bilat STP, BC and IC    IASTM  Lower abdomen myofascial release

## 2023-06-30 NOTE — PROGRESS NOTES
Physical Therapy Progress Note    [facility] Fax: [phone]    PT RE-EVALUATION FOR OUTPATIENT THERAPY    Patient: Mini Hodges     MRN: [id number]  : 2000 22 y.o.    Referring Physician: System, Provider Not In  Date of Visit: 2023    New Certification Dates: 23 through 23    Recommended Frequency & Duration:  1 time/week for up to 3 months        Diagnosis:   1. Pelvic floor dysfunction        Chief Complaints:  No chief complaint on file.      Precautions:   Existing Precautions/Restrictions: no known precautions/restrictions    TODAY'S VISIT:    Time In Session:  Start Time: 1403  Stop Time: 1500  Time Calculation (min): 57 min   General Information - 23 1403        Session Details    Document Type re-evaluation     Mode of Treatment individual therapy        General Information    Referring Physician ABENA Talbert     History of present illness/functional impairment Mini is a 21 y/o cis gender female referred to PFPT by obgyn with c/o dyspareunia and pain with pelvic exams.     Patient/Family/Caregiver Comments/Observations Mini reports that she has been consistent with her exercises at home. She reports only being able to use dilators a few times since last, having less pain with her dilators. She is currently using dilator level 3.     Existing Precautions/Restrictions no known precautions/restrictions                Daily Falls Screen - 23 1831        Daily Falls Assessment    Patient reported fall since last visit No                Pain/Vitals - 23 1403        Pain Assessment    Currently in pain No/Denies        Pre Activity Vital Signs    /74     BP Location Left upper arm     BP Method Manual     Patient Position Sitting                PT - 23 1831        Physical Therapy    Physical Therapy Specialty Pelvic Floor Program: Age 14+        PT Plan    Frequency of treatment 1 time/week     PT Duration 3 months     PT Cert From  06/29/23     PT Cert To 09/27/23     Date PT POC was sent to provider 06/29/23     Signed PT Plan of Care received?  No                Assessment and Plan - 06/29/23 1831        Assessment    Plan of Care reviewed and patient/family in agreement Yes     System Pathology/Pathophysiology Noted musculoskeletal;neuromuscular;genitourinary     Problem List abnormal muscle tone;decreased endurance;decreased strength;decreased flexibility;impaired coordination;pain     Clinical Assessment Mini has been attending PFPT for 9 visits, she continues to reportpain with deeper penetration when sexually active with her partner, no longer reports pain with initial insertion. She appears to have less guarding present in bilat BC and STP, continues to have guarding present in bilat OI and PC. Mini has progressed to level 3 in her dilator set. She appears to have less guarding present in bilat UQs and demonstrates improve diaphragm engagement with breathing. She demonstrates improved PSFS score from 40% to 55%. She notes a 50% improvement since starting PT. Mini will benefit from continued skilled PT to reduce pain and to improve function of PFm.     Plan and Recommendations biweekly 6-8 visits     Planned Services CPT 12938 Gait training;CPT 07040 Manual therapy;CPT 94260 Neuromuscular Reeducation;CPT 42529 Therapeutic activities;CPT 90213 Therapeutic exercises;CPT 47784 Self-care/Home management training                 OBJECTIVE MEASUREMENTS/DATA:     Outcome Measures    PT Outcome Measures - 06/30/23 0629        NEW (2/6/23):  PSFS     PSFS ACTIVITY 1 Sexual activity     PSFS ANSWER 1 5     PSFS ACTIVITY 2 Gynecology exams     PSFS ANSWER 2 6     PSFS Sum of Activity Scores 11     PSFS Number of Activities 2     PSFS Percent Score 55 %                   Outcome Measures        4/6/2023    08:59 6/30/2023    06:29   PT SUBJECTIVE Outcome Measures   PSFS % Score 40 % 55 %       Today's Treatment::    Mini is a 23 y/o cis  gender female presenting to PFPT with c/o dyspareunia and pain with pelvic exams.  Mini went to the gynecologist about a few month ago, she has been having pain with intercourse and was referred to PFPT. She notes always having pain with insertion.     Bladder: Comment:        Urination frequency 6-7x a day   5/11: feeling the same   6/29: 6-7x    Urgency:  Can hold if she needs to, will sometimes have cramping since her IUD   Incontinence Denies    Pads denies   Nocturia 1x a night    Pain denies   Emptying Does not need to strain, feels like she fully empties  6/29: still fully emptying   Prolapse symptoms  Denies    Liquid consumption Drinks about 3-4 refills of 24 oz water bottle, drinks about 3 cups of coffee, pre workout if she is going to the gym   UTI history When she was first sexually active she had 3 UTIs in a month, has not had a UTI recently          Bowel: Comment:        Frequency 2-3x a day, typically spread throughout the day, does not need to strain  5/11: currently on her period so frequency is not her normal   6/29: 2 x a day, 1x a day when she is not working out in the morning    Urge Strong normal urge, can make it    Incontinence denies   Emptying Feels fully empty when she finishes   [place] stool Type 3-4   Fiber Will take fiber, green powder, drink more water and then will take laxatives. Will start taking supplements if she misses a day    Does meal prep, oatmeal or breakfast sandwich or eggs/veggies, potatoes or rice with chicken or turkey for lunch, dinner protein starch and veggie, does a lot protein, will snack on rice cakes and fruit or protein bars and yogurt   Management Fiber, green powder, water and laxatives  5/11: she reports not needing laxatives recently, but has been feeling more bloated on her period   6/29: not needing laxatives recently, having less bloating regularly with probiotics, still feeling bloated around ovulation   Pain Had a hemorrhoid once about a few years  "ago, denies pain with BMs         OBGYN Comment:        Pregnancies 0   Births 0   Birth type n/a   Tears/ episiotomies n/a   Surgery No surgeries   Menstruation IUD has been lighter flow, consistent and regular, lots of cramping but has been less since her IUD. Prior heavier flow, was getting a lot of cramping     5/11: currently on her period, flow is heavier this month                    Sexual Activity Comment:        Type Penis vaginal    Pain Initial insertion will be pain, lasts about 2 minutes, feels it reduces after \"muscle relax,\" will have burning/tearing sensation     Does not notice a difference in pain based on position    Tried lubrication once or twice, will have burning but unsure if it was from initial insertion or the lubrication     History of bleeding post intercourse     Will have discomfort for about 5 min    Always has had pain with intercourse     At worst: 7/10   Post intercourse discomfort: 3-4/10     5/11: Less pain with certain positions, but pain is still present. [place] continues to be most painful period. Pain averages 6/10. Less discomfort following intercourse     6/15: she reports no pain with initial insertion of her partner now, but continues to notice discomfort with deeper penetration that averages a 7/10, post intercourse pain is less and only averages 1/10     6/29: Still having pain with deep penetration, little to no pain with initial insertion    Orgasm Yes    Masturbation Yes, denies pain   Libido Feels it is nonexistence, feels that it could be related to her pain with intercourse    Pain with speculum insertion          Pain Comment:        Low back pain Went for PT previously, has not been noticing back pain since                                Other Comment:        Physical activity  Go to the gym, will do lifting and cardio, walks outside/hiking, 3x a week typically   PLOF Previous history of dancing, did ballet and modern in college   Living environment Lives " with her boyfriend, apartment    Other Works in coffee shop part time, part time working in the office for [email or web address]    Stress management Going to the gym, spending time with partner and watching TV             Systems Review:   Cord questions:  Pins and needles or tingling in both arms and both legs at the same time? denies  Problems with stumbling or falling? Denies     Cauda equina questions:  Problems with bowel and bladder control? Specifically retention? See above  Pins and needles or numbness in the saddle area? Denies     Review of systems:  General - (chills, night sweats, recent infection, fever, weight loss/gain, unexplained night pain, excessive fatigue): Denies  Do you have a history of cancer? Denies  Gastrointestinal system - (abdominal pain, bowel changes, nausea, vomiting, bloating): Bloating that is inconsistent, might have it prior to her menstrual period   Cardiovascular system - (chest pain, palpitations, orthopnea, other): Denies  Respiratory system - (cough, SOB, sputum production, other): Denies  Musculoskeletal system: osteoporosis, vertebral fracture? Denies  Endocrine - (polyuria, polydipsia, heat or cold intolerance, other): Denies  Neurological - (numbness/tingling, falling/stumbling, HA, dizziness, diplopia, dysphagia/dysarthria, double vision, tinnitus, memory, drop attacks, other): Denies  Any long-term steroid use? Denies      Patient goals stop having pain with sexual activity     OBJECTIVE FINDINGS:      ASSESSMENT   PELVIS/POSTURE            LUMBAR AROM    Flexion    Extension    rotation    Sidebending        HIP ROM    ER 6/29: WFL   IR 6/29: WFL   Flexion 6/29: WFL   Extension 6/29: WFL   Ankle ROM    Lower Extremity Strength    hip flexion 6/29: B 5/5   hip extension 6/29: R 5/5 L 4+/5   Hip IR 6/29: R 5/5 L 4+/5   Hip ER 6/29: B 5/5   Hip abduction 6/29: B 5/5   Hip adduction 6/29: B 5/5   Knee    Ankle            SPECIAL TESTS    Hamstring length    Hip FADIR    Hip SCOUR     Trendelenburg    Slump test    SLR test    BAM            LUMBAR PALPATION    HIP PALPATION    OTHER PALPATION        SI JOINT TESTING     S/L Compression test    Thigh thrust test    Prone sacral thrust test    Gaenslen test        ABDOMEN    palpation Muscle guarding present and TTP reported with diaphragm palpation and lower abdomen on L>R side    6/29: TTP reported at midline in diaphragm    Lower rib angle 90 degrees   Breathing pattern Min to moderate diaphragm engagement with breathing  6/29: diaphragm engaged with breathing    Diastasis    Skin/ integumentary        BALANCE    GAIT MECHANICS    RUNNING MECHANICS    SQUATTING MECHANICS    LUNGING MECHANICS    BED MOBILITY    SIT TO STAND      Verbal consent give for internal evaluation and treatment. Pt provides verbal consent for internal assessment     ASSESSMENT   EXTERNAL genital EXAM    PFM OBSERVATION    Skin integrity    contraction    relaxation    Bulge/pelvic drop    Cough    Light touch sensation    INTERNAL PFM exam Internal vaginal   Levator Ani Strength  4/5   Power    Endurance    Repetitions    Quick Flicks    Coordination Demonstrates appropriate lengthening and contraction        Palpation TTP with insertion, muscle guarding and TTP in bilat BC, STP and IC on L>R. Muscle guarding present in bilat BC, STP, and IC     5/11: less discomfort reported with initial insertion, muscle guarding present in bilat BC, STP and IC    6/15: targeted deep layer today, she reports 1-2/10 discomfort with bilat OI palpation, muscle guarding present in OI and PC bilat     6/29: No TTP reported with initial insertion, muscle guarding present in bilat OI and PC           TAILBONE                    TREATMENT LOG:  verbal consent for internal intervention: Pt provides verbal consent for internal assessment    Diagnosis  Pelvic Floor Dysfunction    Precautions       PT INITIAL EVALUATION:   4/6/23     PT PROGRESS NOTE:   5/11/23  Re-eval:6/29/23     Y/N  Treatment Details: Time:   MODALITIES  24683   0 mins   Heat       Ice       THER ACT          31018   25 mins   OUTCOME MEASURES Y PSFS: 40%  6/29: 55%    Falls Screen, Medication Review, VS, pain assessment Y      Pt Education:verbalized understanding of education and returned demonstration appropriately  Y Pelvic floor anatomy/ purpose function, POC, breathing pattern, abdominal tension influence on PFm, pain cycle of vagina   4/13: reviewed pain cycle of vagina, discussed findings on internal assessment, pelvic stability, intimate mabel dilators  5/4: meditation/mindfullness for nervous system down regulation   5/11: dilator options  5/18: dilator training, frequency of dilation, handout provided with setup/cleanup/progression for dilators  6/15: stability training, breathing with exertion, findings on deep layer internal assessment   6/29: progress made so far, plan for future sessions, reviewed progression of dilators     HEP / HEP Review Y  eval: quadruped diaphragmatic breathing, incorporating breathing throughout day   4/13: continue breathing, dilators   5/4: child's pose, happy baby, guided relaxation  5/11: pallof press, shoulder extension, flamingo  5/18: dilators  6/1: dilators for home release, cupping to lower abdomen   6/15: bird dog  6/29: SL foam roller     THER EX         22851   15 mins   pallof press n OTB; x20    Shoulder extension n BTB; x20    Flamingo n BTB; x20    Lunge into hip flexion y 10lb; x10    Half kneel chops n 10lb, x 15    Standing wrap arounds n 10lbs; 3 x 30sec     PB rollout with march n x15 on 65 cm PB    Bird dog n x20    SL foam roller step over y x15    SL foam roller RDL y 9lb kettle bell, x15     Squat into hip flexion y 9lb kettle bell, x 15                 NEURO RE-ED    39757   0 min    Diaphragmatic breathing n In quadruped, x10     Pec stretch n 2 min on foam roller    Thoracic extension  n On foam, x 20     Cat/camel n x20           Guided meditation  n Etelvina  Jarvis Full Body Relaxation     MANUAL                 10677   12 mins   Joint Mobilization       Soft Tissue mobilizations External      Soft Tissue Mobilization Internal y Release to bilat STP, BC and IC    IASTM  Lower abdomen myofascial release                   Goals:   Goals     •  Mutually agreed upon pain goal       Mutually agreed upon pain goal: 0/10    5/11: 6/10    6/15: 7/10 with deeper penetration     6/29: 7/10 pain with deep penetration       •  pelvic goals (pt-stated)       In 8 weeks Mini will:     Be (I) with pelvic floor contraction/ relaxation/ bearing down in order to improve normal bowel and bladder function. MET    (I) with diaphragmatic breathing for improved Pfm tissue mobility MET    be able to tolerate digital palpation of pelvic floor as well as digital insertion and removal with pain reduce by 30% in order to tolerate pelvic exams. MET    improve PSFS by 30% in order to improve QOL as well as pelvic floor function. Progressing, PSFS improves from 40% to 55%    In 16 weeks Mini will:   (I) with HEP in order to maintain and aide in gains made with skilled PT services Progressing    (I) with toilet posture and bowel management in order to improve bowel function- MET    Increase fiber intake by 10-15 grams in order to improve bowel function. MET    be able to tolerate digital palpation of pelvic floor as well as digital insertion and removal with pain reduce by 80% in order to tolerate pelvic exams.  Progressing    improve PSFS by 80% in order to improve QOL as well as pelvic floor function. Progressing, PSFS improves from 40% to 55%    be (I) with self abdominal myofascial massage to decrease abdominal pain and dysfunction. MET    Improve gluteal strength by 1/2 MMT GEO in order to decrease demand on pelvic floor and improve overall functional mobility. Progressing    Reduced TTP and fascial restrictions in abdomen by 50% in order to improve pelvic floor function and QOL.  Progressing    Return to pelvic exams without signs/ symptoms of pelvic floor dysfunction. Progressing        •  stop having pain with sexual activity (pt-stated)       5/11: 15% improvement since starting PT    6/15: she feels she has made a 45% improvement since starting PT    6/29: 50% improvement since starting PT, not having pain with initial insertion, still feeling limited in positions secondary to pain              This 22 y.o. year old female presents to PT with above stated diagnosis. Physical Therapy evaluation reveals abnormal muscle tone, decreased endurance, decreased strength, decreased flexibility, impaired coordination, pain resulting in   limitations. Mini Hodges will benefit from skilled PT services to address limitation, work towards rehab and patient goals and maximize PLOF of chosen ADLs.     Planned Services: The patient's treatment will include CPT 59231 Gait training, CPT 83103 Manual therapy, CPT 76000 Neuromuscular Reeducation, CPT 80624 Therapeutic activities, CPT 99939 Therapeutic exercises, CPT 10364 Self-care/Home management training, .     Andree Kim, PT

## 2023-07-12 ENCOUNTER — TELEPHONE (OUTPATIENT)
Dept: REGISTRATION | Facility: CLINIC | Age: 23
End: 2023-07-12
Payer: COMMERCIAL

## 2023-07-13 ENCOUNTER — HOSPITAL ENCOUNTER (OUTPATIENT)
Dept: PHYSICAL THERAPY | Age: 23
Setting detail: THERAPIES SERIES
Discharge: HOME | End: 2023-07-13
Attending: NURSE PRACTITIONER
Payer: COMMERCIAL

## 2023-07-13 DIAGNOSIS — M62.89 PELVIC FLOOR DYSFUNCTION: Primary | ICD-10-CM

## 2023-07-13 PROCEDURE — 97530 THERAPEUTIC ACTIVITIES: CPT | Mod: GP

## 2023-07-13 PROCEDURE — 97110 THERAPEUTIC EXERCISES: CPT | Mod: GP

## 2023-07-13 PROCEDURE — 97140 MANUAL THERAPY 1/> REGIONS: CPT | Mod: GP

## 2023-07-13 NOTE — OP PT TREATMENT LOG
Mini is a 23 y/o cis gender female presenting to PFPT with c/o dyspareunia and pain with pelvic exams.  Mini went to the gynecologist about a few month ago, she has been having pain with intercourse and was referred to PFPT. She notes always having pain with insertion.     Bladder: Comment:        Urination frequency 6-7x a day   5/11: feeling the same   6/29: 6-7x    Urgency:  Can hold if she needs to, will sometimes have cramping since her IUD   Incontinence Denies    Pads denies   Nocturia 1x a night    Pain denies   Emptying Does not need to strain, feels like she fully empties  6/29: still fully emptying   Prolapse symptoms  Denies    Liquid consumption Drinks about 3-4 refills of 24 oz water bottle, drinks about 3 cups of coffee, pre workout if she is going to the gym   UTI history When she was first sexually active she had 3 UTIs in a month, has not had a UTI recently          Bowel: Comment:        Frequency 2-3x a day, typically spread throughout the day, does not need to strain  5/11: currently on her period so frequency is not her normal   6/29: 2 x a day, 1x a day when she is not working out in the morning    Urge Strong normal urge, can make it    Incontinence denies   Emptying Feels fully empty when she finishes   [place] stool Type 3-4   Fiber Will take fiber, green powder, drink more water and then will take laxatives. Will start taking supplements if she misses a day    Does meal prep, oatmeal or breakfast sandwich or eggs/veggies, potatoes or rice with chicken or turkey for lunch, dinner protein starch and veggie, does a lot protein, will snack on rice cakes and fruit or protein bars and yogurt   Management Fiber, green powder, water and laxatives  5/11: she reports not needing laxatives recently, but has been feeling more bloated on her period   6/29: not needing laxatives recently, having less bloating regularly with probiotics, still feeling bloated around ovulation   Pain Had a hemorrhoid  "once about a few years ago, denies pain with BMs         OBGYN Comment:        Pregnancies 0   Births 0   Birth type n/a   Tears/ episiotomies n/a   Surgery No surgeries   Menstruation IUD has been lighter flow, consistent and regular, lots of cramping but has been less since her IUD. Prior heavier flow, was getting a lot of cramping     5/11: currently on her period, flow is heavier this month                    Sexual Activity Comment:        Type Penis vaginal    Pain Initial insertion will be pain, lasts about 2 minutes, feels it reduces after \"muscle relax,\" will have burning/tearing sensation     Does not notice a difference in pain based on position    Tried lubrication once or twice, will have burning but unsure if it was from initial insertion or the lubrication     History of bleeding post intercourse     Will have discomfort for about 5 min    Always has had pain with intercourse     At worst: 7/10   Post intercourse discomfort: 3-4/10     5/11: Less pain with certain positions, but pain is still present. [place] continues to be most painful period. Pain averages 6/10. Less discomfort following intercourse     6/15: she reports no pain with initial insertion of her partner now, but continues to notice discomfort with deeper penetration that averages a 7/10, post intercourse pain is less and only averages 1/10     6/29: Still having pain with deep penetration, little to no pain with initial insertion    Orgasm Yes    Masturbation Yes, denies pain   Libido Feels it is nonexistence, feels that it could be related to her pain with intercourse    Pain with speculum insertion          Pain Comment:        Low back pain Went for PT previously, has not been noticing back pain since                                Other Comment:        Physical activity  Go to the gym, will do lifting and cardio, walks outside/hiking, 3x a week typically   PLOF Previous history of dancing, did ballet and modern in college "   Living environment Lives with her boyfriend, apartment    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 denies  Problems with stumbling or falling? Denies     Cauda equina questions:  Problems with bowel and bladder control? Specifically retention? See above  Pins and needles or numbness in the saddle area? Denies     Review of systems:  General - (chills, night sweats, recent infection, fever, weight loss/gain, unexplained night pain, excessive fatigue): Denies  Do you have a history of cancer? Denies  Gastrointestinal system - (abdominal pain, bowel changes, nausea, vomiting, bloating): Bloating that is inconsistent, might have it prior to her menstrual period   Cardiovascular system - (chest pain, palpitations, orthopnea, other): Denies  Respiratory system - (cough, SOB, sputum production, other): Denies  Musculoskeletal system: osteoporosis, vertebral fracture? Denies  Endocrine - (polyuria, polydipsia, heat or cold intolerance, other): Denies  Neurological - (numbness/tingling, falling/stumbling, HA, dizziness, diplopia, dysphagia/dysarthria, double vision, tinnitus, memory, drop attacks, other): Denies  Any long-term steroid use? Denies      Patient goals stop having pain with sexual activity     OBJECTIVE FINDINGS:      ASSESSMENT   PELVIS/POSTURE            LUMBAR AROM    Flexion    Extension    rotation    Sidebending        HIP ROM    ER 6/29: WFL   IR 6/29: WFL   Flexion 6/29: WFL   Extension 6/29: WFL   Ankle ROM    Lower Extremity Strength    hip flexion 6/29: B 5/5   hip extension 6/29: R 5/5 L 4+/5   Hip IR 6/29: R 5/5 L 4+/5   Hip ER 6/29: B 5/5   Hip abduction 6/29: B 5/5   Hip adduction 6/29: B 5/5   Knee    Ankle            SPECIAL TESTS    Hamstring length     Hip FADIR    Hip SCOUR    Trendelenburg    Slump test    SLR test    BAM            LUMBAR PALPATION    HIP PALPATION    OTHER PALPATION        SI JOINT TESTING     S/L Compression test    Thigh thrust test    Prone sacral thrust test    Gaenslen test        ABDOMEN    palpation Muscle guarding present and TTP reported with diaphragm palpation and lower abdomen on L>R side    6/29: TTP reported at midline in diaphragm    Lower rib angle 90 degrees   Breathing pattern Min to moderate diaphragm engagement with breathing  6/29: diaphragm engaged with breathing    Diastasis    Skin/ integumentary        BALANCE    GAIT MECHANICS    RUNNING MECHANICS    SQUATTING MECHANICS    LUNGING MECHANICS    BED MOBILITY    SIT TO STAND      Verbal consent give for internal evaluation and treatment. Pt provides verbal consent for internal assessment     ASSESSMENT   EXTERNAL genital EXAM    PFM OBSERVATION    Skin integrity    contraction    relaxation    Bulge/pelvic drop    Cough    Light touch sensation    INTERNAL PFM exam Internal vaginal   Levator Ani Strength  4/5   Power    Endurance    Repetitions    Quick Flicks    Coordination Demonstrates appropriate lengthening and contraction        Palpation TTP with insertion, muscle guarding and TTP in bilat BC, STP and IC on L>R. Muscle guarding present in bilat BC, STP, and IC     5/11: less discomfort reported with initial insertion, muscle guarding present in bilat BC, STP and IC    6/15: targeted deep layer today, she reports 1-2/10 discomfort with bilat OI palpation, muscle guarding present in OI and PC bilat     6/29: No TTP reported with initial insertion, muscle guarding present in bilat OI and PC           TAILBONE                    TREATMENT LOG:  verbal consent for internal intervention: Pt provides verbal consent for internal assessment    Diagnosis  Pelvic Floor Dysfunction    Precautions       PT INITIAL EVALUATION:   4/6/23     PT PROGRESS NOTE:    5/11/23  Re-eval:6/29/23     Y/N Treatment Details: Time:   MODALITIES  50381   0 mins   Heat       Ice       THER ACT          35887   10 mins   OUTCOME MEASURES n PSFS: 40%  6/29: 55%    Falls Screen, Medication Review, VS, pain assessment Y      Pt Education:verbalized understanding of education and returned demonstration appropriately  Y Pelvic floor anatomy/ purpose function, POC, breathing pattern, abdominal tension influence on PFm, pain cycle of vagina   4/13: reviewed pain cycle of vagina, discussed findings on internal assessment, pelvic stability, intimate mabel dilators  5/4: meditation/mindfullness for nervous system down regulation   5/11: dilator options  5/18: dilator training, frequency of dilation, handout provided with setup/cleanup/progression for dilators  6/15: stability training, breathing with exertion, findings on deep layer internal assessment   6/29: progress made so far, plan for future sessions, reviewed progression of dilators   7/13: reviewed dilator frequency     HEP / HEP Review Y  eval: quadruped diaphragmatic breathing, incorporating breathing throughout day   4/13: continue breathing, dilators   5/4: child's pose, happy baby, guided relaxation  5/11: pallof press, shoulder extension, flamingo  5/18: dilators  6/1: dilators for home release, cupping to lower abdomen   6/15: bird dog  6/29: SL foam roller   7/13: return to dilators     THER EX         [id number]   30 mins   pallof press n OTB; x20    Shoulder extension n BTB; x20    Flamingo n BTB; x20    Lunge into hip flexion y 10lb; x10    Half kneel chops n 10lb, x 15    Standing wrap arounds n 10lbs; 3 x 30sec     PB rollout with march n x15 on 65 cm PB    Bird dog n x20    SL foam roller step over n x15    SL foam roller RDL y 9lb kettle bell, x15     Squat into hip flexion n 9lb kettle bell, x 15    SL hip flexion y x20 w/ 2lb ankle weight    Monster walks y PTB x 4 laps    Side steps y PTB x 4 laps    BOSU, rounded side up y Chops  with green theraband, x 20    BOSU, rounded side down y bilat UE raise with exhale    Rebounder y SLS on foam and lateral SLS, 1 kg, x 15                 NEURO RE-ED    30694   0 min    Diaphragmatic breathing n In quadruped, x10     Pec stretch n 2 min on foam roller    Thoracic extension  n On foam, x 20     Cat/camel n x20           Guided meditation  n Etelvina [place] Full Body Relaxation     MANUAL                 [id number]   20 mins   Joint Mobilization       Soft Tissue mobilizations External      Soft Tissue Mobilization Internal y Release to bilat STP, BC and IC    IASTM  Lower abdomen myofascial release

## 2023-07-14 NOTE — PROGRESS NOTES
Physical Therapy Visit    PT DAILY NOTE FOR OUTPATIENT THERAPY    Patient: Mini Hodges MRN: [id number]  : 2000 22 y.o.  Referring Physician: System, Provider Not In  Date of Visit: 2023    Certification Dates: 23 through 23    Diagnosis:   1. Pelvic floor dysfunction        Chief Complaints:       Precautions:   Existing Precautions/Restrictions: no known precautions/restrictions      TODAY'S VISIT    Time In Session:  Start Time: 1400  Stop Time: 1500  Time Calculation (min): 60 min   History/Vitals/Pain/Encounter Info - 23 1400        Injury History/Precautions/Daily Required Info    Document Type daily treatment     Primary Therapist Andree     Referring Physician ABENA Talbert     Existing Precautions/Restrictions no known precautions/restrictions     History of present illness/functional impairment Mini is a 21 y/o cis gender female referred to PFPT by obgyn with c/o dyspareunia and pain with pelvic exams.     Patient/Family/Caregiver Comments/Observations Mini reports she has been traveling and has not had the chance to use her dilators since last visit. She has been consistent with her exercises from her HEP.     Patient reported fall since last visit No        Pain Assessment    Currently in pain No/Denies        Pre Activity Vital Signs    /80     BP Location Left upper arm     BP Method Manual     Patient Position Sitting                Daily Treatment Assessment and Plan - 23 1400        Daily Treatment Assessment and Plan    Progress toward goals Progressing     Daily Outcome Summary Mini appears to have increased muscle guarding present in superficial layer today compared to previous visit. Tension reduces with prolonged pressure and diaphragmatic breathing. Continue to progress stability exercises, pt tolerates. She was encouraged to return to dilator use at a frequency of 2-3x per week. She will benefit from continued skilled PT to reduce  pain and improve function of PFm     Plan and Recommendations progress note                     OBJECTIVE DATA TAKEN TODAY:    None taken    Today's Treatment:    Mini is a 21 y/o cis gender female presenting to PFPT with c/o dyspareunia and pain with pelvic exams.  Mini went to the gynecologist about a few month ago, she has been having pain with intercourse and was referred to PFPT. She notes always having pain with insertion.     Bladder: Comment:        Urination frequency 6-7x a day   5/11: feeling the same   6/29: 6-7x    Urgency:  Can hold if she needs to, will sometimes have cramping since her IUD   Incontinence Denies    Pads denies   Nocturia 1x a night    Pain denies   Emptying Does not need to strain, feels like she fully empties  6/29: still fully emptying   Prolapse symptoms  Denies    Liquid consumption Drinks about 3-4 refills of 24 oz water bottle, drinks about 3 cups of coffee, pre workout if she is going to the gym   UTI history When she was first sexually active she had 3 UTIs in a month, has not had a UTI recently          Bowel: Comment:        Frequency 2-3x a day, typically spread throughout the day, does not need to strain  5/11: currently on her period so frequency is not her normal   6/29: 2 x a day, 1x a day when she is not working out in the morning    Urge Strong normal urge, can make it    Incontinence denies   Emptying Feels fully empty when she finishes   [place] stool Type 3-4   Fiber Will take fiber, green powder, drink more water and then will take laxatives. Will start taking supplements if she misses a day    Does meal prep, oatmeal or breakfast sandwich or eggs/veggies, potatoes or rice with chicken or turkey for lunch, dinner protein starch and veggie, does a lot protein, will snack on rice cakes and fruit or protein bars and yogurt   Management Fiber, green powder, water and laxatives  5/11: she reports not needing laxatives recently, but has been feeling more bloated  "on her period   6/29: not needing laxatives recently, having less bloating regularly with probiotics, still feeling bloated around ovulation   Pain Had a hemorrhoid once about a few years ago, denies pain with BMs         OBGYN Comment:        Pregnancies 0   Births 0   Birth type n/a   Tears/ episiotomies n/a   Surgery No surgeries   Menstruation IUD has been lighter flow, consistent and regular, lots of cramping but has been less since her IUD. Prior heavier flow, was getting a lot of cramping     5/11: currently on her period, flow is heavier this month                    Sexual Activity Comment:        Type Penis vaginal    Pain Initial insertion will be pain, lasts about 2 minutes, feels it reduces after \"muscle relax,\" will have burning/tearing sensation     Does not notice a difference in pain based on position    Tried lubrication once or twice, will have burning but unsure if it was from initial insertion or the lubrication     History of bleeding post intercourse     Will have discomfort for about 5 min    Always has had pain with intercourse     At worst: 7/10   Post intercourse discomfort: 3-4/10     5/11: Less pain with certain positions, but pain is still present. [place] continues to be most painful period. Pain averages 6/10. Less discomfort following intercourse     6/15: she reports no pain with initial insertion of her partner now, but continues to notice discomfort with deeper penetration that averages a 7/10, post intercourse pain is less and only averages 1/10     6/29: Still having pain with deep penetration, little to no pain with initial insertion    Orgasm Yes    Masturbation Yes, denies pain   Libido Feels it is nonexistence, feels that it could be related to her pain with intercourse    Pain with speculum insertion          Pain Comment:        Low back pain Went for PT previously, has not been noticing back pain since                                Other Comment:        Physical " activity  Go to the gym, will do lifting and cardio, walks outside/hiking, 3x a week typically   PLOF Previous history of dancing, did ballet and modern in college   Living environment Lives with her boyfriend, apartment    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 denies  Problems with stumbling or falling? Denies     Cauda equina questions:  Problems with bowel and bladder control? Specifically retention? See above  Pins and needles or numbness in the saddle area? Denies     Review of systems:  General - (chills, night sweats, recent infection, fever, weight loss/gain, unexplained night pain, excessive fatigue): Denies  Do you have a history of cancer? Denies  Gastrointestinal system - (abdominal pain, bowel changes, nausea, vomiting, bloating): Bloating that is inconsistent, might have it prior to her menstrual period   Cardiovascular system - (chest pain, palpitations, orthopnea, other): Denies  Respiratory system - (cough, SOB, sputum production, other): Denies  Musculoskeletal system: osteoporosis, vertebral fracture? Denies  Endocrine - (polyuria, polydipsia, heat or cold intolerance, other): Denies  Neurological - (numbness/tingling, falling/stumbling, HA, dizziness, diplopia, dysphagia/dysarthria, double vision, tinnitus, memory, drop attacks, other): Denies  Any long-term steroid use? Denies      Patient goals stop having pain with sexual activity     OBJECTIVE FINDINGS:      ASSESSMENT   PELVIS/POSTURE            LUMBAR AROM    Flexion    Extension    rotation    Sidebending        HIP ROM    ER 6/29: WFL   IR 6/29: WFL   Flexion 6/29: WFL   Extension 6/29: WFL   Ankle ROM    Lower Extremity Strength    hip flexion 6/29: B 5/5   hip extension 6/29: R 5/5 L 4+/5   Hip  IR 6/29: R 5/5 L 4+/5   Hip ER 6/29: B 5/5   Hip abduction 6/29: B 5/5   Hip adduction 6/29: B 5/5   Knee    Ankle            SPECIAL TESTS    Hamstring length    Hip FADIR    Hip SCOUR    Trendelenburg    Slump test    SLR test    BAM            LUMBAR PALPATION    HIP PALPATION    OTHER PALPATION        SI JOINT TESTING     S/L Compression test    Thigh thrust test    Prone sacral thrust test    Gaenslen test        ABDOMEN    palpation Muscle guarding present and TTP reported with diaphragm palpation and lower abdomen on L>R side    6/29: TTP reported at midline in diaphragm    Lower rib angle 90 degrees   Breathing pattern Min to moderate diaphragm engagement with breathing  6/29: diaphragm engaged with breathing    Diastasis    Skin/ integumentary        BALANCE    GAIT MECHANICS    RUNNING MECHANICS    SQUATTING MECHANICS    LUNGING MECHANICS    BED MOBILITY    SIT TO STAND      Verbal consent give for internal evaluation and treatment. Pt provides verbal consent for internal assessment     ASSESSMENT   EXTERNAL genital EXAM    PFM OBSERVATION    Skin integrity    contraction    relaxation    Bulge/pelvic drop    Cough    Light touch sensation    INTERNAL PFM exam Internal vaginal   Levator Ani Strength  4/5   Power    Endurance    Repetitions    Quick Flicks    Coordination Demonstrates appropriate lengthening and contraction        Palpation TTP with insertion, muscle guarding and TTP in bilat BC, STP and IC on L>R. Muscle guarding present in bilat BC, STP, and IC     5/11: less discomfort reported with initial insertion, muscle guarding present in bilat BC, STP and IC    6/15: targeted deep layer today, she reports 1-2/10 discomfort with bilat OI palpation, muscle guarding present in OI and PC bilat     6/29: No TTP reported with initial insertion, muscle guarding present in bilat OI and PC           TAILBONE                    TREATMENT LOG:  verbal consent for internal intervention: Pt provides verbal  consent for internal assessment    Diagnosis  Pelvic Floor Dysfunction    Precautions       PT INITIAL EVALUATION:   4/6/23     PT PROGRESS NOTE:   5/11/23  Re-eval:6/29/23     Y/N Treatment Details: Time:   MODALITIES  77751   0 mins   Heat       Ice       THER ACT          [id number]   10 mins   OUTCOME MEASURES n PSFS: 40%  6/29: 55%    Falls Screen, Medication Review, VS, pain assessment Y      Pt Education:verbalized understanding of education and returned demonstration appropriately  Y Pelvic floor anatomy/ purpose function, POC, breathing pattern, abdominal tension influence on PFm, pain cycle of vagina   4/13: reviewed pain cycle of vagina, discussed findings on internal assessment, pelvic stability, intimate mabel dilators  5/4: meditation/mindfullness for nervous system down regulation   5/11: dilator options  5/18: dilator training, frequency of dilation, handout provided with setup/cleanup/progression for dilators  6/15: stability training, breathing with exertion, findings on deep layer internal assessment   6/29: progress made so far, plan for future sessions, reviewed progression of dilators   7/13: reviewed dilator frequency     HEP / HEP Review Y  eval: quadruped diaphragmatic breathing, incorporating breathing throughout day   4/13: continue breathing, dilators   5/4: child's pose, happy baby, guided relaxation  5/11: pallof press, shoulder extension, flamingo  5/18: dilators  6/1: dilators for home release, cupping to lower abdomen   6/15: bird dog  6/29: SL foam roller   7/13: return to dilators     THER EX         [id number]   30 mins   pallof press n OTB; x20    Shoulder extension n BTB; x20    Flamingo n BTB; x20    Lunge into hip flexion y 10lb; x10    Half kneel chops n 10lb, x 15    Standing wrap arounds n 10lbs; 3 x 30sec     PB rollout with march n x15 on 65 cm PB    Bird dog n x20    SL foam roller step over n x15    SL foam roller RDL y 9lb kettle bell, x15     Squat into hip flexion n 9lb kettle  jauregui, x 15    SL hip flexion y x20 w/ 2lb ankle weight    Monster walks y PTB x 4 laps    Side steps y PTB x 4 laps    BOSU, rounded side up y Chops with green theraband, x 20    BOSU, rounded side down y bilat UE raise with exhale    Rebounder y SLS on foam and lateral SLS, 1 kg, x 15                 NEURO RE-ED    [id number]   0 min    Diaphragmatic breathing n In quadruped, x10     Pec stretch n 2 min on foam roller    Thoracic extension  n On foam, x 20     Cat/camel n x20           Guided meditation  n Etelvina [place] Full Body Relaxation     MANUAL                 [id number]   20 mins   Joint Mobilization       Soft Tissue mobilizations External      Soft Tissue Mobilization Internal y Release to bilat STP, BC and IC    IASTM  Lower abdomen myofascial release

## 2023-07-27 ENCOUNTER — HOSPITAL ENCOUNTER (OUTPATIENT)
Dept: PHYSICAL THERAPY | Age: 23
Setting detail: THERAPIES SERIES
Discharge: HOME | End: 2023-07-27
Attending: NURSE PRACTITIONER
Payer: COMMERCIAL

## 2023-07-27 DIAGNOSIS — M62.89 PELVIC FLOOR DYSFUNCTION: Primary | ICD-10-CM

## 2023-07-27 PROCEDURE — 97112 NEUROMUSCULAR REEDUCATION: CPT | Mod: GP

## 2023-07-27 PROCEDURE — 97530 THERAPEUTIC ACTIVITIES: CPT | Mod: GP

## 2023-07-27 PROCEDURE — 97140 MANUAL THERAPY 1/> REGIONS: CPT | Mod: GP

## 2023-07-27 NOTE — PROGRESS NOTES
"Physical Therapy Progress Note    PT PROGRESS NOTE FOR OUTPATIENT THERAPY    Patient: Mini Hodges   MRN: [id number]  : 2000 22 y.o.    Referring Physician: System, Provider Not In  Date of Visit: 2023    Certification Dates: 23 through 23    Recommended Frequency & Duration:  1 time/week for up to 3 months        Diagnosis:   1. Pelvic floor dysfunction        Chief Complaints:  No chief complaint on file.      Precautions:   Existing Precautions/Restrictions: no known precautions/restrictions    TODAY'S VISIT:    Time In Session:  Start Time: 1400  Stop Time: 1500  Time Calculation (min): 60 min   General Information - 23 1359        Session Details    Document Type progress note     Mode of Treatment individual therapy        General Information    Referring Physician ABENA Talbert     History of present illness/functional impairment Mini is a 23 y/o cis gender female referred to PFPT by obgyn with c/o dyspareunia and pain with pelvic exams.     Patient/Family/Caregiver Comments/Observations Mini reports that she has been consistent with her dilators and was able to progress to level 4. She notes needing to \"work her way in\" with level 4.     Existing Precautions/Restrictions no known precautions/restrictions                Daily Falls Screen - 23 1359        Daily Falls Assessment    Patient reported fall since last visit No                Pain/Vitals - 23 1359        Pain Assessment    Currently in pain No/Denies        Pre Activity Vital Signs    /66     BP Location Left upper arm     BP Method Manual     Patient Position Sitting                PT - 23 1359        Physical Therapy    Physical Therapy Specialty Pelvic Floor Program: Age 14+        PT Plan    Frequency of treatment 1 time/week     PT Duration 3 months     PT Cert From 23     PT Cert To 23     Date PT POC was sent to provider 23     Signed PT Plan of Care " received?  No                Assessment and Plan - 07/27/23 1804        Assessment    Plan of Care reviewed and patient/family in agreement Yes     System Pathology/Pathophysiology Noted musculoskeletal;neuromuscular;genitourinary     Problem List abnormal muscle tone;decreased endurance;decreased strength;decreased flexibility;impaired coordination;pain     Clinical Assessment Mini has been attending PFPT for 11 visits, she continues to progress through her dilators with ability to fully insert level 4 with minimal to no discomfort. She appears to have muscle guarding present in bilat DTP that reduces with prolonged pressure and diaphragmatic breathing. Mini notes mild discomfort with initial insertion on internal assessment. Continued to progress core strengthening with planks on BOSU. She will benefit from continued skilled PT to reduce pain and improve function of PFm     Plan and Recommendations internal release, progress core strengthening     Planned Services CPT 18549 Manual therapy;CPT 51438 Neuromuscular Reeducation;CPT 97371 Gait training;CPT 84527 Therapeutic exercises;CPT 29726 Therapeutic activities;CPT 82543 Self-care/Home management training                 OBJECTIVE MEASUREMENTS/DATA:    See below    Outcome Measures        4/6/2023    08:59 6/30/2023    06:29   PT SUBJECTIVE Outcome Measures   PSFS % Score 40 % 55 %       Today's Treatment::    Education provided:  Yes: See treatment log for details of education provided    Mini is a 23 y/o cis gender female presenting to PFPT with c/o dyspareunia and pain with pelvic exams.  Mini went to the gynecologist about a few month ago, she has been having pain with intercourse and was referred to PFPT. She notes always having pain with insertion.     Bladder: Comment:        Urination frequency 6-7x a day   5/11: feeling the same   6/29: 6-7x   7/27: 6-7x a day   Urgency:  Can hold if she needs to, will sometimes have cramping since her  IUD  7/27: no issue with holding   Incontinence Denies    Pads denies   Nocturia 1x a night    Pain denies   Emptying Does not need to strain, feels like she fully empties  6/29: still fully emptying  7/27: same   Prolapse symptoms  Denies    Liquid consumption Drinks about 3-4 refills of 24 oz water bottle, drinks about 3 cups of coffee, pre workout if she is going to the gym   UTI history When she was first sexually active she had 3 UTIs in a month, has not had a UTI recently          Bowel: Comment:        Frequency 2-3x a day, typically spread throughout the day, does not need to strain  5/11: currently on her period so frequency is not her normal   6/29: 2 x a day, 1x a day when she is not working out in the morning   7/27: BMs have been regular   Urge Strong normal urge, can make it    Incontinence denies   Emptying Feels fully empty when she finishes   [place] stool Type 3-4   Fiber Will take fiber, green powder, drink more water and then will take laxatives. Will start taking supplements if she misses a day    Does meal prep, oatmeal or breakfast sandwich or eggs/veggies, potatoes or rice with chicken or turkey for lunch, dinner protein starch and veggie, does a lot protein, will snack on rice cakes and fruit or protein bars and yogurt   Management Fiber, green powder, water and laxatives  5/11: she reports not needing laxatives recently, but has been feeling more bloated on her period   6/29: not needing laxatives recently, having less bloating regularly with probiotics, still feeling bloated around ovulation   Pain Had a hemorrhoid once about a few years ago, denies pain with BMs         OBGYN Comment:        Pregnancies 0   Births 0   Birth type n/a   Tears/ episiotomies n/a   Surgery No surgeries   Menstruation IUD has been lighter flow, consistent and regular, lots of cramping but has been less since her IUD. Prior heavier flow, was getting a lot of cramping     5/11: currently on her period, flow is  "heavier this month                    Sexual Activity Comment:        Type Penis vaginal    Pain Initial insertion will be pain, lasts about 2 minutes, feels it reduces after \"muscle relax,\" will have burning/tearing sensation     Does not notice a difference in pain based on position    Tried lubrication once or twice, will have burning but unsure if it was from initial insertion or the lubrication     History of bleeding post intercourse     Will have discomfort for about 5 min    Always has had pain with intercourse     At worst: 7/10   Post intercourse discomfort: 3-4/10     5/11: Less pain with certain positions, but pain is still present. Mission[place] continues to be most painful period. Pain averages 6/10. Less discomfort following intercourse     6/15: she reports no pain with initial insertion of her partner now, but continues to notice discomfort with deeper penetration that averages a     6/29: Still having pain with deep penetration, little to no pain with initial insertion     7/10, post intercourse pain is less and only averages 1/10     7/27: did not have intercourse since last session, discomfort with level four dilator   Orgasm Yes    Masturbation Yes, denies pain   Libido Feels it is nonexistence, feels that it could be related to her pain with intercourse    Pain with speculum insertion          Pain Comment:        Low back pain Went for PT previously, has not been noticing back pain since                                Other Comment:        Physical activity  Go to the gym, will do lifting and cardio, walks outside/hiking, 3x a week typically   PLOF Previous history of dancing, did ballet and modern in college   Living environment Lives with her boyfriend, apartment    Other Works in coffee shop part time, part time working in the office for [employer]    Stress management Going to the gym, spending time with partner and watching TV             Systems Review:   Cord questions:  Pins and needles or " tingling in both arms and both legs at the same time? denies  Problems with stumbling or falling? Denies     Cauda equina questions:  Problems with bowel and bladder control? Specifically retention? See above  Pins and needles or numbness in the saddle area? Denies     Review of systems:  General - (chills, night sweats, recent infection, fever, weight loss/gain, unexplained night pain, excessive fatigue): Denies  Do you have a history of cancer? Denies  Gastrointestinal system - (abdominal pain, bowel changes, nausea, vomiting, bloating): Bloating that is inconsistent, might have it prior to her menstrual period   Cardiovascular system - (chest pain, palpitations, orthopnea, other): Denies  Respiratory system - (cough, SOB, sputum production, other): Denies  Musculoskeletal system: osteoporosis, vertebral fracture? Denies  Endocrine - (polyuria, polydipsia, heat or cold intolerance, other): Denies  Neurological - (numbness/tingling, falling/stumbling, HA, dizziness, diplopia, dysphagia/dysarthria, double vision, tinnitus, memory, drop attacks, other): Denies  Any long-term steroid use? Denies      Patient goals stop having pain with sexual activity     OBJECTIVE FINDINGS:      ASSESSMENT   PELVIS/POSTURE            LUMBAR AROM    Flexion    Extension    rotation    Sidebending        HIP ROM    ER 6/29: WFL   IR 6/29: WFL   Flexion 6/29: WFL   Extension 6/29: WFL   Ankle ROM    Lower Extremity Strength    hip flexion 6/29: B 5/5   hip extension 6/29: R 5/5 L 4+/5   Hip IR 6/29: R 5/5 L 4+/5   Hip ER 6/29: B 5/5   Hip abduction 6/29: B 5/5   Hip adduction 6/29: B 5/5   Knee    Ankle            SPECIAL TESTS    Hamstring length    Hip FADIR    Hip SCOUR    Trendelenburg    Slump test    SLR test    BAM            LUMBAR PALPATION    HIP PALPATION    OTHER PALPATION        SI JOINT TESTING     S/L Compression test    Thigh thrust test    Prone sacral thrust test    Gaenslen test        ABDOMEN    palpation Muscle  guarding present and TTP reported with diaphragm palpation and lower abdomen on L>R side    6/29: TTP reported at midline in diaphragm    Lower rib angle 90 degrees   Breathing pattern Min to moderate diaphragm engagement with breathing  6/29: diaphragm engaged with breathing    Diastasis    Skin/ integumentary        BALANCE    GAIT MECHANICS    RUNNING MECHANICS    SQUATTING MECHANICS    LUNGING MECHANICS    BED MOBILITY    SIT TO STAND      Verbal consent give for internal evaluation and treatment. Pt provides verbal consent for internal assessment     ASSESSMENT   EXTERNAL genital EXAM    PFM OBSERVATION    Skin integrity    contraction    relaxation    Bulge/pelvic drop    Cough    Light touch sensation    INTERNAL PFM exam Internal vaginal   Levator Ani Strength  4/5   Power    Endurance    Repetitions    Quick Flicks    Coordination Demonstrates appropriate lengthening and contraction        Palpation TTP with insertion, muscle guarding and TTP in bilat BC, STP and IC on L>R. Muscle guarding present in bilat BC, STP, and IC     5/11: less discomfort reported with initial insertion, muscle guarding present in bilat BC, STP and IC    6/15: targeted deep layer today, she reports 1-2/10 discomfort with bilat OI palpation, muscle guarding present in OI and PC bilat     6/29: No TTP reported with initial insertion, muscle guarding present in bilat OI and PC    7/27: slight TTP with initial insertion that does not linger, muscle guarding present in bilat DTP           TAILBONE                    TREATMENT LOG:  verbal consent for internal intervention: Pt provides verbal consent for internal assessment    Diagnosis  Pelvic Floor Dysfunction    Precautions       PT INITIAL EVALUATION:   4/6/23     PT PROGRESS NOTE:   5/11/23  Re-eval:6/29/23 5/27/23     Y/N Treatment Details: Time:   MODALITIES  02021   0 mins   Heat       Ice       THER ACT          99237   25 mins   OUTCOME MEASURES n PSFS: 40%  6/29: 55%    Falls  Screen, Medication Review, VS, pain assessment Y      Pt Education:verbalized understanding of education and returned demonstration appropriately  Y Pelvic floor anatomy/ purpose function, POC, breathing pattern, abdominal tension influence on PFm, pain cycle of vagina   4/13: reviewed pain cycle of vagina, discussed findings on internal assessment, pelvic stability, intimate mabel dilators  5/4: meditation/mindfullness for nervous system down regulation   5/11: dilator options  5/18: dilator training, frequency of dilation, handout provided with setup/cleanup/progression for dilators  6/15: stability training, breathing with exertion, findings on deep layer internal assessment   6/29: progress made so far, plan for future sessions, reviewed progression of dilators   7/13: reviewed dilator frequency   7/27: progressing dilators     HEP / HEP Review Y  eval: quadruped diaphragmatic breathing, incorporating breathing throughout day   4/13: continue breathing, dilators   5/4: child's pose, happy baby, guided relaxation  5/11: pallof press, shoulder extension, flamingo  5/18: dilators  6/1: dilators for home release, cupping to lower abdomen   6/15: bird dog  6/29: SL foam roller   7/13: return to dilators   7/27: half kneel stretch    THER EX         65918   5 mins   pallof press n OTB; x20    Shoulder extension n BTB; x20    Flamingo n BTB; x20    Lunge into hip flexion n 10lb; x10    Half kneel chops n 10lb, x 15    Standing wrap arounds n 10lbs; 3 x 30sec     PB rollout with march n x15 on 65 cm PB    Bird dog n x20    SL foam roller step over n x15    SL foam roller RDL n 9lb kettle bell, x15     Squat into hip flexion n 9lb kettle bell, x 15    SL hip flexion n x20 w/ 2lb ankle weight    Monster walks n PTB x 4 laps    Side steps n PTB x 4 laps    BOSU, rounded side up n Chops with green theraband, x 20    BOSU, rounded side down n bilat UE raise with exhale    Rebounder n SLS on foam and lateral SLS, 1 kg, x 15     Planks y With BOSU, mountain climbers x 10           NEURO RE-ED    49309   15 min    Diaphragmatic breathing n In quadruped, x10     Pec stretch y 2 min on foam roller    Thoracic extension  y On foam, x 20     Cat/camel n x20     Half kneel stretch y 3 x 30 sec    Guided meditation  n Etelvina Conley Full Body Relaxation     MANUAL                 84214   15 mins   Joint Mobilization       Soft Tissue mobilizations External      Soft Tissue Mobilization Internal y Release to bilat STP, BC and IC    IASTM  Lower abdomen myofascial release                    Goals Addressed                    This Visit's Progress    •  Mutually agreed upon pain goal         Mutually agreed upon pain goal: 0/10    5/11: 6/10    6/15: 7/10 with deeper penetration     6/29: 7/10 pain with deep penetration     7/27: restriction present with level four dilator, 6/10 pain with initial insertion of level 4       •  stop having pain with sexual activity (pt-stated)         5/11: 15% improvement since starting PT    6/15: she feels she has made a 45% improvement since starting PT    6/29: 50% improvement since starting PT, not having pain with initial insertion, still feeling limited in positions secondary to pain    7/27: feeling better about dilator progression            Andree Kim, PT

## 2023-07-27 NOTE — OP PT TREATMENT LOG
Mini is a 21 y/o cis gender female presenting to PFPT with c/o dyspareunia and pain with pelvic exams.  Mini went to the gynecologist about a few month ago, she has been having pain with intercourse and was referred to PFPT. She notes always having pain with insertion.     Bladder: Comment:        Urination frequency 6-7x a day   5/11: feeling the same   6/29: 6-7x   7/27: 6-7x a day   Urgency:  Can hold if she needs to, will sometimes have cramping since her IUD  7/27: no issue with holding   Incontinence Denies    Pads denies   Nocturia 1x a night    Pain denies   Emptying Does not need to strain, feels like she fully empties  6/29: still fully emptying  7/27: same   Prolapse symptoms  Denies    Liquid consumption Drinks about 3-4 refills of 24 oz water bottle, drinks about 3 cups of coffee, pre workout if she is going to the gym   UTI history When she was first sexually active she had 3 UTIs in a month, has not had a UTI recently          Bowel: Comment:        Frequency 2-3x a day, typically spread throughout the day, does not need to strain  5/11: currently on her period so frequency is not her normal   6/29: 2 x a day, 1x a day when she is not working out in the morning   7/27: BMs have been regular   Urge Strong normal urge, can make it    Incontinence denies   Emptying Feels fully empty when she finishes   [place] stool Type 3-4   Fiber Will take fiber, green powder, drink more water and then will take laxatives. Will start taking supplements if she misses a day    Does meal prep, oatmeal or breakfast sandwich or eggs/veggies, potatoes or rice with chicken or turkey for lunch, dinner protein starch and veggie, does a lot protein, will snack on rice cakes and fruit or protein bars and yogurt   Management Fiber, green powder, water and laxatives  5/11: she reports not needing laxatives recently, but has been feeling more bloated on her period   6/29: not needing laxatives recently, having less bloating  "regularly with probiotics, still feeling bloated around ovulation   Pain Had a hemorrhoid once about a few years ago, denies pain with BMs         OBGYN Comment:        Pregnancies 0   Births 0   Birth type n/a   Tears/ episiotomies n/a   Surgery No surgeries   Menstruation IUD has been lighter flow, consistent and regular, lots of cramping but has been less since her IUD. Prior heavier flow, was getting a lot of cramping     5/11: currently on her period, flow is heavier this month                    Sexual Activity Comment:        Type Penis vaginal    Pain Initial insertion will be pain, lasts about 2 minutes, feels it reduces after \"muscle relax,\" will have burning/tearing sensation     Does not notice a difference in pain based on position    Tried lubrication once or twice, will have burning but unsure if it was from initial insertion or the lubrication     History of bleeding post intercourse     Will have discomfort for about 5 min    Always has had pain with intercourse     At worst: 7/10   Post intercourse discomfort: 3-4/10     5/11: Less pain with certain positions, but pain is still present. [place] continues to be most painful period. Pain averages 6/10. Less discomfort following intercourse     6/15: she reports no pain with initial insertion of her partner now, but continues to notice discomfort with deeper penetration that averages a     6/29: Still having pain with deep penetration, little to no pain with initial insertion     7/10, post intercourse pain is less and only averages 1/10     7/27: did not have intercourse since last session, discomfort with level four dilator   Orgasm Yes    Masturbation Yes, denies pain   Libido Feels it is nonexistence, feels that it could be related to her pain with intercourse    Pain with speculum insertion          Pain Comment:        Low back pain Went for PT previously, has not been noticing back pain since                                Other Comment:     "    Physical activity  Go to the gym, will do lifting and cardio, walks outside/hiking, 3x a week typically   PLOF Previous history of dancing, did ballet and modern in college   Living environment Lives with her boyfriend, apartment    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 denies  Problems with stumbling or falling? Denies     Cauda equina questions:  Problems with bowel and bladder control? Specifically retention? See above  Pins and needles or numbness in the saddle area? Denies     Review of systems:  General - (chills, night sweats, recent infection, fever, weight loss/gain, unexplained night pain, excessive fatigue): Denies  Do you have a history of cancer? Denies  Gastrointestinal system - (abdominal pain, bowel changes, nausea, vomiting, bloating): Bloating that is inconsistent, might have it prior to her menstrual period   Cardiovascular system - (chest pain, palpitations, orthopnea, other): Denies  Respiratory system - (cough, SOB, sputum production, other): Denies  Musculoskeletal system: osteoporosis, vertebral fracture? Denies  Endocrine - (polyuria, polydipsia, heat or cold intolerance, other): Denies  Neurological - (numbness/tingling, falling/stumbling, HA, dizziness, diplopia, dysphagia/dysarthria, double vision, tinnitus, memory, drop attacks, other): Denies  Any long-term steroid use? Denies      Patient goals stop having pain with sexual activity     OBJECTIVE FINDINGS:      ASSESSMENT   PELVIS/POSTURE            LUMBAR AROM    Flexion    Extension    rotation    Sidebending        HIP ROM    ER 6/29: WFL   IR 6/29: WFL   Flexion 6/29: WFL   Extension 6/29: WFL   Ankle ROM    Lower Extremity Strength    hip flexion 6/29: B 5/5   hip extension 6/29: R 5/5 L  4+/5   Hip IR 6/29: R 5/5 L 4+/5   Hip ER 6/29: B 5/5   Hip abduction 6/29: B 5/5   Hip adduction 6/29: B 5/5   Knee    Ankle            SPECIAL TESTS    Hamstring length    Hip FADIR    Hip SCOUR    Trendelenburg    Slump test    SLR test    BAM            LUMBAR PALPATION    HIP PALPATION    OTHER PALPATION        SI JOINT TESTING     S/L Compression test    Thigh thrust test    Prone sacral thrust test    Gaenslen test        ABDOMEN    palpation Muscle guarding present and TTP reported with diaphragm palpation and lower abdomen on L>R side    6/29: TTP reported at midline in diaphragm    Lower rib angle 90 degrees   Breathing pattern Min to moderate diaphragm engagement with breathing  6/29: diaphragm engaged with breathing    Diastasis    Skin/ integumentary        BALANCE    GAIT MECHANICS    RUNNING MECHANICS    SQUATTING MECHANICS    LUNGING MECHANICS    BED MOBILITY    SIT TO STAND      Verbal consent give for internal evaluation and treatment. Pt provides verbal consent for internal assessment     ASSESSMENT   EXTERNAL genital EXAM    PFM OBSERVATION    Skin integrity    contraction    relaxation    Bulge/pelvic drop    Cough    Light touch sensation    INTERNAL PFM exam Internal vaginal   Levator Ani Strength  4/5   Power    Endurance    Repetitions    Quick Flicks    Coordination Demonstrates appropriate lengthening and contraction        Palpation TTP with insertion, muscle guarding and TTP in bilat BC, STP and IC on L>R. Muscle guarding present in bilat BC, STP, and IC     5/11: less discomfort reported with initial insertion, muscle guarding present in bilat BC, STP and IC    6/15: targeted deep layer today, she reports 1-2/10 discomfort with bilat OI palpation, muscle guarding present in OI and PC bilat     6/29: No TTP reported with initial insertion, muscle guarding present in bilat OI and PC    7/27: slight TTP with initial insertion that does not linger, muscle guarding present in bilat DTP            TAILBONE                    TREATMENT LOG:  verbal consent for internal intervention: Pt provides verbal consent for internal assessment    Diagnosis  Pelvic Floor Dysfunction    Precautions       PT INITIAL EVALUATION:   4/6/23     PT PROGRESS NOTE:   5/11/23  Re-eval:6/29/23 5/27/23     Y/N Treatment Details: Time:   MODALITIES  69691   0 mins   Heat       Ice       THER ACT          [id number]   25 mins   OUTCOME MEASURES n PSFS: 40%  6/29: 55%    Falls Screen, Medication Review, VS, pain assessment Y      Pt Education:verbalized understanding of education and returned demonstration appropriately  Y Pelvic floor anatomy/ purpose function, POC, breathing pattern, abdominal tension influence on PFm, pain cycle of vagina   4/13: reviewed pain cycle of vagina, discussed findings on internal assessment, pelvic stability, intimate mabel dilators  5/4: meditation/mindfullness for nervous system down regulation   5/11: dilator options  5/18: dilator training, frequency of dilation, handout provided with setup/cleanup/progression for dilators  6/15: stability training, breathing with exertion, findings on deep layer internal assessment   6/29: progress made so far, plan for future sessions, reviewed progression of dilators   7/13: reviewed dilator frequency   7/27: progressing dilators     HEP / HEP Review Y  eval: quadruped diaphragmatic breathing, incorporating breathing throughout day   4/13: continue breathing, dilators   5/4: child's pose, happy baby, guided relaxation  5/11: pallof press, shoulder extension, flamingo  5/18: dilators  6/1: dilators for home release, cupping to lower abdomen   6/15: bird dog  6/29: SL foam roller   7/13: return to dilators   7/27: half kneel stretch    THER EX         [id number]   5 mins   pallof press n OTB; x20    Shoulder extension n BTB; x20    Flamingo n BTB; x20    Lunge into hip flexion n 10lb; x10    Half kneel chops n 10lb, x 15    Standing wrap arounds n 10lbs; 3 x 30sec     PB  rollout with march n x15 on 65 cm PB    Bird dog n x20    SL foam roller step over n x15    SL foam roller RDL n 9lb kettle bell, x15     Squat into hip flexion n 9lb kettle bell, x 15    SL hip flexion n x20 w/ 2lb ankle weight    Monster walks n PTB x 4 laps    Side steps n PTB x 4 laps    BOSU, rounded side up n Chops with green theraband, x 20    BOSU, rounded side down n bilat UE raise with exhale    Rebounder n SLS on foam and lateral SLS, 1 kg, x 15    Planks y With BOSU, mountain climbers x 10           NEURO RE-ED    [id number]   15 min    Diaphragmatic breathing n In quadruped, x10     Pec stretch y 2 min on foam roller    Thoracic extension  y On foam, x 20     Cat/camel n x20     Half kneel stretch y 3 x 30 sec    Guided meditation  n Etelvina Conley Full Body Relaxation     MANUAL                 [id number]   15 mins   Joint Mobilization       Soft Tissue mobilizations External      Soft Tissue Mobilization Internal y Release to bilat STP, BC and IC    IASTM  Lower abdomen myofascial release

## 2023-08-10 ENCOUNTER — HOSPITAL ENCOUNTER (OUTPATIENT)
Dept: PHYSICAL THERAPY | Age: 23
Setting detail: THERAPIES SERIES
Discharge: HOME | End: 2023-08-10
Attending: NURSE PRACTITIONER
Payer: COMMERCIAL

## 2023-08-10 DIAGNOSIS — M62.89 PELVIC FLOOR DYSFUNCTION: Primary | ICD-10-CM

## 2023-08-10 PROCEDURE — 97530 THERAPEUTIC ACTIVITIES: CPT | Mod: GP

## 2023-08-10 PROCEDURE — 97110 THERAPEUTIC EXERCISES: CPT | Mod: GP

## 2023-08-10 PROCEDURE — 97140 MANUAL THERAPY 1/> REGIONS: CPT | Mod: GP

## 2023-08-10 NOTE — OP PT TREATMENT LOG
Mini is a 23 y/o cis gender female presenting to PFPT with c/o dyspareunia and pain with pelvic exams.  Mini went to the gynecologist about a few month ago, she has been having pain with intercourse and was referred to PFPT. She notes always having pain with insertion.     Bladder: Comment:        Urination frequency 6-7x a day   5/11: feeling the same   6/29: 6-7x   7/27: 6-7x a day   Urgency:  Can hold if she needs to, will sometimes have cramping since her IUD  7/27: no issue with holding   Incontinence Denies    Pads denies   Nocturia 1x a night    Pain denies   Emptying Does not need to strain, feels like she fully empties  6/29: still fully emptying  7/27: same   Prolapse symptoms  Denies    Liquid consumption Drinks about 3-4 refills of 24 oz water bottle, drinks about 3 cups of coffee, pre workout if she is going to the gym   UTI history When she was first sexually active she had 3 UTIs in a month, has not had a UTI recently          Bowel: Comment:        Frequency 2-3x a day, typically spread throughout the day, does not need to strain  5/11: currently on her period so frequency is not her normal   6/29: 2 x a day, 1x a day when she is not working out in the morning   7/27: BMs have been regular   Urge Strong normal urge, can make it    Incontinence denies   Emptying Feels fully empty when she finishes   [place] stool Type 3-4   Fiber Will take fiber, green powder, drink more water and then will take laxatives. Will start taking supplements if she misses a day    Does meal prep, oatmeal or breakfast sandwich or eggs/veggies, potatoes or rice with chicken or turkey for lunch, dinner protein starch and veggie, does a lot protein, will snack on rice cakes and fruit or protein bars and yogurt   Management Fiber, green powder, water and laxatives  5/11: she reports not needing laxatives recently, but has been feeling more bloated on her period   6/29: not needing laxatives recently, having less bloating  "regularly with probiotics, still feeling bloated around ovulation   Pain Had a hemorrhoid once about a few years ago, denies pain with BMs         OBGYN Comment:        Pregnancies 0   Births 0   Birth type n/a   Tears/ episiotomies n/a   Surgery No surgeries   Menstruation IUD has been lighter flow, consistent and regular, lots of cramping but has been less since her IUD. Prior heavier flow, was getting a lot of cramping     5/11: currently on her period, flow is heavier this month                    Sexual Activity Comment:        Type Penis vaginal    Pain Initial insertion will be pain, lasts about 2 minutes, feels it reduces after \"muscle relax,\" will have burning/tearing sensation     Does not notice a difference in pain based on position    Tried lubrication once or twice, will have burning but unsure if it was from initial insertion or the lubrication     History of bleeding post intercourse     Will have discomfort for about 5 min    Always has had pain with intercourse     At worst: 7/10   Post intercourse discomfort: 3-4/10     5/11: Less pain with certain positions, but pain is still present. [place] continues to be most painful period. Pain averages 6/10. Less discomfort following intercourse     6/15: she reports no pain with initial insertion of her partner now, but continues to notice discomfort with deeper penetration that averages a     6/29: Still having pain with deep penetration, little to no pain with initial insertion     7/10, post intercourse pain is less and only averages 1/10     7/27: did not have intercourse since last session, discomfort with level four dilator   Orgasm Yes    Masturbation Yes, denies pain   Libido Feels it is nonexistence, feels that it could be related to her pain with intercourse    Pain with speculum insertion          Pain Comment:        Low back pain Went for PT previously, has not been noticing back pain since                                Other Comment:     "    Physical activity  Go to the gym, will do lifting and cardio, walks outside/hiking, 3x a week typically   PLOF Previous history of dancing, did ballet and modern in college   Living environment Lives with her boyfriend, apartment    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 denies  Problems with stumbling or falling? Denies     Cauda equina questions:  Problems with bowel and bladder control? Specifically retention? See above  Pins and needles or numbness in the saddle area? Denies     Review of systems:  General - (chills, night sweats, recent infection, fever, weight loss/gain, unexplained night pain, excessive fatigue): Denies  Do you have a history of cancer? Denies  Gastrointestinal system - (abdominal pain, bowel changes, nausea, vomiting, bloating): Bloating that is inconsistent, might have it prior to her menstrual period   Cardiovascular system - (chest pain, palpitations, orthopnea, other): Denies  Respiratory system - (cough, SOB, sputum production, other): Denies  Musculoskeletal system: osteoporosis, vertebral fracture? Denies  Endocrine - (polyuria, polydipsia, heat or cold intolerance, other): Denies  Neurological - (numbness/tingling, falling/stumbling, HA, dizziness, diplopia, dysphagia/dysarthria, double vision, tinnitus, memory, drop attacks, other): Denies  Any long-term steroid use? Denies      Patient goals stop having pain with sexual activity     OBJECTIVE FINDINGS:      ASSESSMENT   PELVIS/POSTURE            LUMBAR AROM    Flexion    Extension    rotation    Sidebending        HIP ROM    ER 6/29: WFL   IR 6/29: WFL   Flexion 6/29: WFL   Extension 6/29: WFL   Ankle ROM    Lower Extremity Strength    hip flexion 6/29: B 5/5   hip extension 6/29: R 5/5 L  4+/5   Hip IR 6/29: R 5/5 L 4+/5   Hip ER 6/29: B 5/5   Hip abduction 6/29: B 5/5   Hip adduction 6/29: B 5/5   Knee    Ankle            SPECIAL TESTS    Hamstring length    Hip FADIR    Hip SCOUR    Trendelenburg    Slump test    SLR test    BAM            LUMBAR PALPATION    HIP PALPATION    OTHER PALPATION        SI JOINT TESTING     S/L Compression test    Thigh thrust test    Prone sacral thrust test    Gaenslen test        ABDOMEN    palpation Muscle guarding present and TTP reported with diaphragm palpation and lower abdomen on L>R side    6/29: TTP reported at midline in diaphragm    Lower rib angle 90 degrees   Breathing pattern Min to moderate diaphragm engagement with breathing  6/29: diaphragm engaged with breathing    Diastasis    Skin/ integumentary        BALANCE    GAIT MECHANICS    RUNNING MECHANICS    SQUATTING MECHANICS    LUNGING MECHANICS    BED MOBILITY    SIT TO STAND      Verbal consent give for internal evaluation and treatment. Pt provides verbal consent for internal assessment     ASSESSMENT   EXTERNAL genital EXAM    PFM OBSERVATION    Skin integrity    contraction    relaxation    Bulge/pelvic drop    Cough    Light touch sensation    INTERNAL PFM exam Internal vaginal   Levator Ani Strength  4/5   Power    Endurance    Repetitions    Quick Flicks    Coordination Demonstrates appropriate lengthening and contraction        Palpation TTP with insertion, muscle guarding and TTP in bilat BC, STP and IC on L>R. Muscle guarding present in bilat BC, STP, and IC     5/11: less discomfort reported with initial insertion, muscle guarding present in bilat BC, STP and IC    6/15: targeted deep layer today, she reports 1-2/10 discomfort with bilat OI palpation, muscle guarding present in OI and PC bilat     6/29: No TTP reported with initial insertion, muscle guarding present in bilat OI and PC    7/27: slight TTP with initial insertion that does not linger, muscle guarding present in bilat DTP            [facility]                    TREATMENT LOG:  verbal consent for internal intervention: Pt provides verbal consent for internal assessment    Diagnosis  Pelvic Floor Dysfunction    Precautions       PT INITIAL EVALUATION:   4/6/23     PT PROGRESS NOTE:   5/11/23  Re-eval:6/29/23 5/27/23     Y/N Treatment Details: Time:   MODALITIES  94288   0 mins   Heat       Ice       THER ACT          [id number]   10 mins   OUTCOME MEASURES n PSFS: 40%  6/29: 55%    Falls Screen, Medication Review, VS, pain assessment Y      Pt Education:verbalized understanding of education and returned demonstration appropriately  Y Pelvic floor anatomy/ purpose function, POC, breathing pattern, abdominal tension influence on PFm, pain cycle of vagina   4/13: reviewed pain cycle of vagina, discussed findings on internal assessment, pelvic stability, intimate mabel dilators  5/4: meditation/mindfullness for nervous system down regulation   5/11: dilator options  5/18: dilator training, frequency of dilation, handout provided with setup/cleanup/progression for dilators  6/15: stability training, breathing with exertion, findings on deep layer internal assessment   6/29: progress made so far, plan for future sessions, reviewed progression of dilators   7/13: reviewed dilator frequency   7/27: progressing dilators     HEP / HEP Review Y  eval: quadruped diaphragmatic breathing, incorporating breathing throughout day   4/13: continue breathing, dilators   5/4: child's pose, happy baby, guided relaxation  5/11: pallof press, shoulder extension, flamingo  5/18: dilators  6/1: dilators for home release, cupping to lower abdomen   6/15: bird dog  6/29: SL foam roller   7/13: return to dilators   7/27: half kneel stretch  8/10: Bosu plank progressions    THER EX         [id number]   23 mins   pallof press n OTB; x20    Shoulder extension n BTB; x20    Flamingo n BTB; x20    Lunge into hip flexion n 10lb; x10    Half kneel chops n 10lb, x 15    Standing wrap  arounds n 10lbs; 3 x 30sec     PB rollout with march n x15 on 65 cm PB    Bird dog y X20, LE only with PTB     SL foam roller step over n x15    SL foam roller RDL y 9lb kettle bell, x15     Squat into hip flexion n 9lb kettle bell, x 15    SL hip flexion n x20 w/ 2lb ankle weight    Monster walks n PTB x 4 laps    Side steps n PTB x 4 laps    BOSU, rounded side up n Chops with green theraband, x 20    BOSU, rounded side down n bilat UE raise with exhale    Rebounder n SLS on foam and lateral SLS, 1 kg, x 15    Planks y With BOSU, mountain climbers x 10, and lateral taps x 6    Physioball rollout with march y x15    Stir the pot y x15                 NEURO RE-ED    [id number]   0 min    Diaphragmatic breathing n In quadruped, x10     Pec stretch n 2 min on foam roller    Thoracic extension  n On foam, x 20     Cat/camel n x20     Half kneel stretch n 3 x 30 sec    Guided meditation  n Etelvina Conley Full Body Relaxation     MANUAL                 [id number]   25 mins   Joint Mobilization       Soft Tissue mobilizations External      Soft Tissue Mobilization Internal y Release to bilat STP, BC and IC    IASTM  Lower abdomen myofascial release

## 2023-08-11 NOTE — PROGRESS NOTES
Physical Therapy Visit    PT DAILY NOTE FOR OUTPATIENT THERAPY    Patient: Mini Hodges MRN: [id number]  : 2000 22 y.o.  Referring Physician: System, Provider Not In  Date of Visit: 8/10/2023    Certification Dates: 23 through 23    Diagnosis:   1. Pelvic floor dysfunction        Chief Complaints:       Precautions:   Existing Precautions/Restrictions: no known precautions/restrictions      TODAY'S VISIT    Time In Session:  Start Time: 1402  Stop Time: 1500  Time Calculation (min): 58 min   History/Vitals/Pain/Encounter Info - 08/10/23 1405        Injury History/Precautions/Daily Required Info    Document Type daily treatment     Primary Therapist Andree     Referring Physician ABENA Talbert     Existing Precautions/Restrictions no known precautions/restrictions     History of present illness/functional impairment Mini is a 21 y/o cis gender female referred to PFPT by obgyn with c/o dyspareunia and pain with pelvic exams.     Patient/Family/Caregiver Comments/Observations Mini reports that she was able to use her dilator 1x a week. She notes insertion of level 4 has become quicker. She notes that her bowel movements have been easier recently     Patient reported fall since last visit No        Pain Assessment    Currently in pain No/Denies        Pre Activity Vital Signs    /80     BP Location Left upper arm     BP Method Manual     Patient Position Sitting                Daily Treatment Assessment and Plan - 08/10/23 1405        Daily Treatment Assessment and Plan    Progress toward goals Progressing     Daily Outcome Summary Continued internal release, pt appears to have guarding present in L DTP, which reduces with prolonged pressure and breathing, she denies TTP with release and initial insertion. Continued to progress core strengthening, pt reports appropriate challenge with BOSU planks and lateral taps. She will benefit from continued skilled PT to reduce pain and  improve function of PFm     Plan and Recommendations progress note                     OBJECTIVE DATA TAKEN TODAY:    None taken    Today's Treatment:    Mini is a 23 y/o cis gender female presenting to PFPT with c/o dyspareunia and pain with pelvic exams.  Mini went to the gynecologist about a few month ago, she has been having pain with intercourse and was referred to PFPT. She notes always having pain with insertion.     Bladder: Comment:        Urination frequency 6-7x a day   5/11: feeling the same   6/29: 6-7x   7/27: 6-7x a day   Urgency:  Can hold if she needs to, will sometimes have cramping since her IUD  7/27: no issue with holding   Incontinence Denies    Pads denies   Nocturia 1x a night    Pain denies   Emptying Does not need to strain, feels like she fully empties  6/29: still fully emptying  7/27: same   Prolapse symptoms  Denies    Liquid consumption Drinks about 3-4 refills of 24 oz water bottle, drinks about 3 cups of coffee, pre workout if she is going to the gym   UTI history When she was first sexually active she had 3 UTIs in a month, has not had a UTI recently          Bowel: Comment:        Frequency 2-3x a day, typically spread throughout the day, does not need to strain  5/11: currently on her period so frequency is not her normal   6/29: 2 x a day, 1x a day when she is not working out in the morning   7/27: BMs have been regular   Urge Strong normal urge, can make it    Incontinence denies   Emptying Feels fully empty when she finishes   [place] stool Type 3-4   Fiber Will take fiber, green powder, drink more water and then will take laxatives. Will start taking supplements if she misses a day    Does meal prep, oatmeal or breakfast sandwich or eggs/veggies, potatoes or rice with chicken or turkey for lunch, dinner protein starch and veggie, does a lot protein, will snack on rice cakes and fruit or protein bars and yogurt   Management Fiber, green powder, water and laxatives  5/11:  "she reports not needing laxatives recently, but has been feeling more bloated on her period   6/29: not needing laxatives recently, having less bloating regularly with probiotics, still feeling bloated around ovulation   Pain Had a hemorrhoid once about a few years ago, denies pain with BMs         OBGYN Comment:        Pregnancies 0   Births 0   Birth type n/a   Tears/ episiotomies n/a   Surgery No surgeries   Menstruation IUD has been lighter flow, consistent and regular, lots of cramping but has been less since her IUD. Prior heavier flow, was getting a lot of cramping     5/11: currently on her period, flow is heavier this month                    Sexual Activity Comment:        Type Penis vaginal    Pain Initial insertion will be pain, lasts about 2 minutes, feels it reduces after \"muscle relax,\" will have burning/tearing sensation     Does not notice a difference in pain based on position    Tried lubrication once or twice, will have burning but unsure if it was from initial insertion or the lubrication     History of bleeding post intercourse     Will have discomfort for about 5 min    Always has had pain with intercourse     At worst: 7/10   Post intercourse discomfort: 3-4/10     5/11: Less pain with certain positions, but pain is still present. Mission[place] continues to be most painful period. Pain averages 6/10. Less discomfort following intercourse     6/15: she reports no pain with initial insertion of her partner now, but continues to notice discomfort with deeper penetration that averages a     6/29: Still having pain with deep penetration, little to no pain with initial insertion     7/10, post intercourse pain is less and only averages 1/10     7/27: did not have intercourse since last session, discomfort with level four dilator   Orgasm Yes    Masturbation Yes, denies pain   Libido Feels it is nonexistence, feels that it could be related to her pain with intercourse    Pain with speculum insertion  "         Pain Comment:        Low back pain Went for PT previously, has not been noticing back pain since                                Other Comment:        Physical activity  Go to the gym, will do lifting and cardio, walks outside/hiking, 3x a week typically   PLOF Previous history of dancing, did ballet and modern in college   Living environment Lives with her boyfriend, apartment    Other Works in coffee shop part time, part time working in the office for [employer]    Stress management Going to the gym, spending time with partner and watching TV             Systems Review:   Cord questions:  Pins and needles or tingling in both arms and both legs at the same time? denies  Problems with stumbling or falling? Denies     Cauda equina questions:  Problems with bowel and bladder control? Specifically retention? See above  Pins and needles or numbness in the saddle area? Denies     Review of systems:  General - (chills, night sweats, recent infection, fever, weight loss/gain, unexplained night pain, excessive fatigue): Denies  Do you have a history of cancer? Denies  Gastrointestinal system - (abdominal pain, bowel changes, nausea, vomiting, bloating): Bloating that is inconsistent, might have it prior to her menstrual period   Cardiovascular system - (chest pain, palpitations, orthopnea, other): Denies  Respiratory system - (cough, SOB, sputum production, other): Denies  Musculoskeletal system: osteoporosis, vertebral fracture? Denies  Endocrine - (polyuria, polydipsia, heat or cold intolerance, other): Denies  Neurological - (numbness/tingling, falling/stumbling, HA, dizziness, diplopia, dysphagia/dysarthria, double vision, tinnitus, memory, drop attacks, other): Denies  Any long-term steroid use? Denies      Patient goals stop having pain with sexual activity     OBJECTIVE FINDINGS:      ASSESSMENT   PELVIS/POSTURE            LUMBAR AROM    Flexion    Extension    rotation    Sidebending        HIP ROM    ER 6/29:  WFL   IR 6/29: WFL   Flexion 6/29: WFL   Extension 6/29: WFL   Ankle ROM    Lower Extremity Strength    hip flexion 6/29: B 5/5   hip extension 6/29: R 5/5 L 4+/5   Hip IR 6/29: R 5/5 L 4+/5   Hip ER 6/29: B 5/5   Hip abduction 6/29: B 5/5   Hip adduction 6/29: B 5/5   Knee    Ankle            SPECIAL TESTS    Hamstring length    Hip FADIR    Hip SCOUR    Trendelenburg    Slump test    SLR test    BAM            LUMBAR PALPATION    HIP PALPATION    OTHER PALPATION        SI JOINT TESTING     S/L Compression test    Thigh thrust test    Prone sacral thrust test    Gaenslen test        ABDOMEN    palpation Muscle guarding present and TTP reported with diaphragm palpation and lower abdomen on L>R side    6/29: TTP reported at midline in diaphragm    Lower rib angle 90 degrees   Breathing pattern Min to moderate diaphragm engagement with breathing  6/29: diaphragm engaged with breathing    Diastasis    Skin/ integumentary        BALANCE    GAIT MECHANICS    RUNNING MECHANICS    SQUATTING MECHANICS    LUNGING MECHANICS    BED MOBILITY    SIT TO STAND      Verbal consent give for internal evaluation and treatment. Pt provides verbal consent for internal assessment     ASSESSMENT   EXTERNAL genital EXAM    PFM OBSERVATION    Skin integrity    contraction    relaxation    Bulge/pelvic drop    Cough    Light touch sensation    INTERNAL PFM exam Internal vaginal   Levator Ani Strength  4/5   Power    Endurance    Repetitions    Quick Flicks    Coordination Demonstrates appropriate lengthening and contraction        Palpation TTP with insertion, muscle guarding and TTP in bilat BC, STP and IC on L>R. Muscle guarding present in bilat BC, STP, and IC     5/11: less discomfort reported with initial insertion, muscle guarding present in bilat BC, STP and IC    6/15: targeted deep layer today, she reports 1-2/10 discomfort with bilat OI palpation, muscle guarding present in OI and PC bilat     6/29: No TTP reported with initial  insertion, muscle guarding present in bilat OI and PC    7/27: slight TTP with initial insertion that does not linger, muscle guarding present in bilat DTP           TAILBONE                    TREATMENT LOG:  verbal consent for internal intervention: Pt provides verbal consent for internal assessment    Diagnosis  Pelvic Floor Dysfunction    Precautions       PT INITIAL EVALUATION:   4/6/23     PT PROGRESS NOTE:   5/11/23  Re-eval:6/29/23 5/27/23     Y/N Treatment Details: Time:   MODALITIES  77628   0 mins   Heat       Ice       THER ACT          78430   10 mins   OUTCOME MEASURES n PSFS: 40%  6/29: 55%    Falls Screen, Medication Review, VS, pain assessment Y      Pt Education:verbalized understanding of education and returned demonstration appropriately  Y Pelvic floor anatomy/ purpose function, POC, breathing pattern, abdominal tension influence on PFm, pain cycle of vagina   4/13: reviewed pain cycle of vagina, discussed findings on internal assessment, pelvic stability, intimate mabel dilators  5/4: meditation/mindfullness for nervous system down regulation   5/11: dilator options  5/18: dilator training, frequency of dilation, handout provided with setup/cleanup/progression for dilators  6/15: stability training, breathing with exertion, findings on deep layer internal assessment   6/29: progress made so far, plan for future sessions, reviewed progression of dilators   7/13: reviewed dilator frequency   7/27: progressing dilators     HEP / HEP Review Y  eval: quadruped diaphragmatic breathing, incorporating breathing throughout day   4/13: continue breathing, dilators   5/4: child's pose, happy baby, guided relaxation  5/11: pallof press, shoulder extension, flamingo  5/18: dilators  6/1: dilators for home release, cupping to lower abdomen   6/15: bird dog  6/29: SL foam roller   7/13: return to dilators   7/27: half kneel stretch  8/10: Bosu plank progressions    THER EX         [id number]   23 mins   pallof  press n OTB; x20    Shoulder extension n BTB; x20    Flamingo n BTB; x20    Lunge into hip flexion n 10lb; x10    Half kneel chops n 10lb, x 15    Standing wrap arounds n 10lbs; 3 x 30sec     PB rollout with march n x15 on 65 cm PB    Bird dog y X20, LE only with PTB     SL foam roller step over n x15    SL foam roller RDL y 9lb kettle bell, x15     Squat into hip flexion n 9lb kettle bell, x 15    SL hip flexion n x20 w/ 2lb ankle weight    Monster walks n PTB x 4 laps    Side steps n PTB x 4 laps    BOSU, rounded side up n Chops with green theraband, x 20    BOSU, rounded side down n bilat UE raise with exhale    Rebounder n SLS on foam and lateral SLS, 1 kg, x 15    Planks y With BOSU, mountain climbers x 10, and lateral taps x 6    Physioball rollout with march y x15    Stir the pot y x15                 NEURO RE-ED    33322   0 min    Diaphragmatic breathing n In quadruped, x10     Pec stretch n 2 min on foam roller    Thoracic extension  n On foam, x 20     Cat/camel n x20     Half kneel stretch n 3 x 30 sec    Guided meditation  madeleine Conley Full Body Relaxation     MANUAL                 [id number]   25 mins   Joint Mobilization       Soft Tissue mobilizations External      Soft Tissue Mobilization Internal y Release to bilat STP, BC and IC    IASTM  Lower abdomen myofascial release

## 2023-08-21 ENCOUNTER — HOSPITAL ENCOUNTER (OUTPATIENT)
Dept: PHYSICAL THERAPY | Age: 23
Setting detail: THERAPIES SERIES
Discharge: HOME | End: 2023-08-21
Attending: NURSE PRACTITIONER
Payer: COMMERCIAL

## 2023-08-21 DIAGNOSIS — M62.89 PELVIC FLOOR DYSFUNCTION: Primary | ICD-10-CM

## 2023-08-21 PROCEDURE — 97140 MANUAL THERAPY 1/> REGIONS: CPT | Mod: GP

## 2023-08-21 PROCEDURE — 97530 THERAPEUTIC ACTIVITIES: CPT | Mod: GP

## 2023-08-21 NOTE — OP PT TREATMENT LOG
Mini is a 23 y/o cis gender female presenting to PFPT with c/o dyspareunia and pain with pelvic exams.  Mini went to the gynecologist about a few month ago, she has been having pain with intercourse and was referred to PFPT. She notes always having pain with insertion.     Bladder: Comment:        Urination frequency 6-7x a day   5/11: feeling the same   6/29: 6-7x   7/27: 6-7x a day  8/21: same   Urgency:  Can hold if she needs to, will sometimes have cramping since her IUD  7/27: no issue with holding   Incontinence Denies    Pads denies   Nocturia 1x a night    Pain denies   Emptying Does not need to strain, feels like she fully empties  6/29: still fully emptying  7/27: same   Prolapse symptoms  Denies    Liquid consumption Drinks about 3-4 refills of 24 oz water bottle, drinks about 3 cups of coffee, pre workout if she is going to the gym   UTI history When she was first sexually active she had 3 UTIs in a month, has not had a UTI recently          Bowel: Comment:        Frequency 2-3x a day, typically spread throughout the day, does not need to strain  5/11: currently on her period so frequency is not her normal   6/29: 2 x a day, 1x a day when she is not working out in the morning   7/27: BMs have been regular  8/21: same   Urge Strong normal urge, can make it    Incontinence denies   Emptying Feels fully empty when she finishes   [place] stool Type 3-4   Fiber Will take fiber, green powder, drink more water and then will take laxatives. Will start taking supplements if she misses a day    Does meal prep, oatmeal or breakfast sandwich or eggs/veggies, potatoes or rice with chicken or turkey for lunch, dinner protein starch and veggie, does a lot protein, will snack on rice cakes and fruit or protein bars and yogurt   Management Fiber, green powder, water and laxatives  5/11: she reports not needing laxatives recently, but has been feeling more bloated on her period   6/29: not needing laxatives  "recently, having less bloating regularly with probiotics, still feeling bloated around ovulation   Pain Had a hemorrhoid once about a few years ago, denies pain with BMs         OBGYN Comment:        Pregnancies 0   Births 0   Birth type n/a   Tears/ episiotomies n/a   Surgery No surgeries   Menstruation IUD has been lighter flow, consistent and regular, lots of cramping but has been less since her IUD. Prior heavier flow, was getting a lot of cramping     5/11: currently on her period, flow is heavier this month                    Sexual Activity Comment:        Type Penis vaginal    Pain Initial insertion will be pain, lasts about 2 minutes, feels it reduces after \"muscle relax,\" will have burning/tearing sensation     Does not notice a difference in pain based on position    Tried lubrication once or twice, will have burning but unsure if it was from initial insertion or the lubrication     History of bleeding post intercourse     Will have discomfort for about 5 min    Always has had pain with intercourse     At worst: 7/10   Post intercourse discomfort: 3-4/10     5/11: Less pain with certain positions, but pain is still present. [place] continues to be most painful period. Pain averages 6/10. Less discomfort following intercourse     6/15: she reports no pain with initial insertion of her partner now, but continues to notice discomfort with deeper penetration that averages a     6/29: Still having pain with deep penetration, little to no pain with initial insertion     7/10, post intercourse pain is less and only averages 1/10     7/27: did not have intercourse since last session, discomfort with level four dilator    8/21: pain can be present for about 30 seconds and will be a 4-5/10 at worst    Orgasm Yes    Masturbation Yes, denies pain   Libido Feels it is nonexistence, feels that it could be related to her pain with intercourse    Pain with speculum insertion          Pain Comment:        Low back pain " Went for PT previously, has not been noticing back pain since                                Other Comment:        Physical activity  Go to the gym, will do lifting and cardio, walks outside/hiking, 3x a week typically   PLOF Previous history of dancing, did ballet and modern in college   Living environment Lives with her boyfriend, apartment    Other Works in coffee shop part time, part time working in the office for [employer]    Stress management Going to the gym, spending time with partner and watching TV             Systems Review:   Cord questions:  Pins and needles or tingling in both arms and both legs at the same time? denies  Problems with stumbling or falling? Denies     Cauda equina questions:  Problems with bowel and bladder control? Specifically retention? See above  Pins and needles or numbness in the saddle area? Denies     Review of systems:  General - (chills, night sweats, recent infection, fever, weight loss/gain, unexplained night pain, excessive fatigue): Denies  Do you have a history of cancer? Denies  Gastrointestinal system - (abdominal pain, bowel changes, nausea, vomiting, bloating): Bloating that is inconsistent, might have it prior to her menstrual period   Cardiovascular system - (chest pain, palpitations, orthopnea, other): Denies  Respiratory system - (cough, SOB, sputum production, other): Denies  Musculoskeletal system: osteoporosis, vertebral fracture? Denies  Endocrine - (polyuria, polydipsia, heat or cold intolerance, other): Denies  Neurological - (numbness/tingling, falling/stumbling, HA, dizziness, diplopia, dysphagia/dysarthria, double vision, tinnitus, memory, drop attacks, other): Denies  Any long-term steroid use? Denies      Patient goals stop having pain with sexual activity     OBJECTIVE FINDINGS:      ASSESSMENT   PELVIS/POSTURE            LUMBAR AROM    Flexion    Extension    rotation    Sidebending        HIP ROM    ER 6/29: WFL   IR 6/29: WFL   Flexion 6/29: WFL    Extension 6/29: WFL   Ankle ROM    Lower Extremity Strength    hip flexion 6/29: B 5/5   hip extension 6/29: R 5/5 L 4+/5   Hip IR 6/29: R 5/5 L 4+/5   Hip ER 6/29: B 5/5   Hip abduction 6/29: B 5/5   Hip adduction 6/29: B 5/5   Knee    Ankle            SPECIAL TESTS    Hamstring length    Hip FADIR    Hip SCOUR    Trendelenburg    Slump test    SLR test    BAM            LUMBAR PALPATION    HIP PALPATION 8/21: proximal adductors are TTP and appear guarded    OTHER PALPATION        SI JOINT TESTING     S/L Compression test    Thigh thrust test    Prone sacral thrust test    Gaenslen test        ABDOMEN    palpation Muscle guarding present and TTP reported with diaphragm palpation and lower abdomen on L>R side    6/29: TTP reported at midline in diaphragm    Lower rib angle 90 degrees   Breathing pattern Min to moderate diaphragm engagement with breathing  6/29: diaphragm engaged with breathing    Diastasis    Skin/ integumentary        BALANCE    GAIT MECHANICS    RUNNING MECHANICS    SQUATTING MECHANICS    LUNGING MECHANICS    BED MOBILITY    SIT TO STAND      Verbal consent give for internal evaluation and treatment. Pt provides verbal consent for internal assessment     ASSESSMENT   EXTERNAL genital EXAM    PFM OBSERVATION    Skin integrity    contraction    relaxation    Bulge/pelvic drop    Cough    Light touch sensation    INTERNAL PFM exam Internal vaginal   Levator Ani Strength  4/5   Power    Endurance    Repetitions    Quick Flicks    Coordination Demonstrates appropriate lengthening and contraction        Palpation TTP with insertion, muscle guarding and TTP in bilat BC, STP and IC on L>R. Muscle guarding present in bilat BC, STP, and IC     5/11: less discomfort reported with initial insertion, muscle guarding present in bilat BC, STP and IC    6/15: targeted deep layer today, she reports 1-2/10 discomfort with bilat OI palpation, muscle guarding present in OI and PC bilat     6/29: No TTP reported  with initial insertion, muscle guarding present in bilat OI and PC    7/27: slight TTP with initial insertion that does not linger, muscle guarding present in bilat DTP    8/21: discomfort with deeper palpation, mild muscle guarding in L>R DTP           TAILBONE                    TREATMENT LOG:  verbal consent for internal intervention: Pt provides verbal consent for internal assessment    Diagnosis  Pelvic Floor Dysfunction    Precautions       PT INITIAL EVALUATION:   4/6/23     PT PROGRESS NOTE:   5/11/23  Re-eval:6/29/23 7/27/23 8/21/23     Y/N Treatment Details: Time:   MODALITIES  36707   0 mins   Heat       Ice       THER ACT          78676   20 mins   OUTCOME MEASURES n PSFS: 40%  6/29: 55%    Falls Screen, Medication Review, VS, pain assessment Y      Pt Education:verbalized understanding of education and returned demonstration appropriately  Y Pelvic floor anatomy/ purpose function, POC, breathing pattern, abdominal tension influence on PFm, pain cycle of vagina   4/13: reviewed pain cycle of vagina, discussed findings on internal assessment, pelvic stability, intimate mabel dilators  5/4: meditation/mindfullness for nervous system down regulation   5/11: dilator options  5/18: dilator training, frequency of dilation, handout provided with setup/cleanup/progression for dilators  6/15: stability training, breathing with exertion, findings on deep layer internal assessment   6/29: progress made so far, plan for future sessions, reviewed progression of dilators   7/13: reviewed dilator frequency   7/27: progressing dilators   8/21: ohnut for limiting depth of penetration, reviewed symptom monitoring/avoiding greater than 3/10 pain with sexual activity or dilator use    HEP / HEP Review Y  eval: quadruped diaphragmatic breathing, incorporating breathing throughout day   4/13: continue breathing, dilators   5/4: child's pose, happy baby, guided relaxation  5/11: pallof press, shoulder extension,  flamingo  5/18: dilators  6/1: dilators for home release, cupping to lower abdomen   6/15: bird dog  6/29: SL foam roller   7/13: return to dilators   7/27: half kneel stretch  8/10: Bosu plank progressions  8/21: continue dilators    THER EX         [id number]   0 mins   pallof press n OTB; x20    Shoulder extension n BTB; x20    Flamingo n BTB; x20    Lunge into hip flexion n 10lb; x10    Half kneel chops n 10lb, x 15    Standing wrap arounds n 10lbs; 3 x 30sec     PB rollout with march n x15 on 65 cm PB    Bird dog n X20, LE only with PTB     SL foam roller step over n x15    SL foam roller RDL n 9lb kettle bell, x15     Squat into hip flexion n 9lb kettle bell, x 15    SL hip flexion n x20 w/ 2lb ankle weight    Monster walks n PTB x 4 laps    Side steps n PTB x 4 laps    BOSU, rounded side up n Chops with green theraband, x 20    BOSU, rounded side down n bilat UE raise with exhale    Rebounder n SLS on foam and lateral SLS, 1 kg, x 15    Planks n With BOSU, mountain climbers x 10, and lateral taps x 6    Physioball rollout with march n x15    Stir the pot n x15                 NEURO RE-ED    [id number]   0 min    Diaphragmatic breathing n In quadruped, x10     Pec stretch n 2 min on foam roller    Thoracic extension  n On foam, x 20     Cat/camel n x20     Half kneel stretch n 3 x 30 sec    Guided meditation  n Etelvina Jarvis Full Body Relaxation     MANUAL                 [id number]   25 mins   Joint Mobilization       Soft Tissue mobilizations External      Soft Tissue Mobilization Internal y Release to bilat DTP     IASTM     y Lower abdomen myofascial release    bilat proximal hip adductors

## 2023-08-22 NOTE — PROGRESS NOTES
Physical Therapy Progress Note    PT PROGRESS NOTE FOR OUTPATIENT THERAPY    Patient: Mini Hodges   MRN: [id number]  : 2000 22 y.o.    Referring Physician: Marbella Jackson PA  Date of Visit: 2023    Certification Dates: 23 through 23    Recommended Frequency & Duration:  1 time/week for up to 3 months        Diagnosis:   1. Pelvic floor dysfunction        Chief Complaints:  No chief complaint on file.      Precautions:   Existing Precautions/Restrictions: no known precautions/restrictions    TODAY'S VISIT:    Time In Session:  Start Time: 1214 (pt arrives late)  Stop Time: 1259  Time Calculation (min): 45 min   General Information - 23 1206        Session Details    Document Type progress note     Mode of Treatment individual therapy        General Information    Referring Physician ABENA Talbert     History of present illness/functional impairment Mini is a 23 y/o cis gender female referred to PFPT by obgyn with c/o dyspareunia and pain with pelvic exams.     Patient/Family/Caregiver Comments/Observations Renetta reports that insertion has been easier and is noticing the pain and when there is pain it subsides quicker. She feels that deeper penetration has been easier as well. She is no longer having pain after intercourse. She was using her dilators consistently this past two weeks.     Existing Precautions/Restrictions no known precautions/restrictions                Daily Falls Screen - 23 1214        Daily Falls Assessment    Patient reported fall since last visit No                Pain/Vitals - 23 1214        Pain Assessment    Currently in pain No/Denies        Pre Activity Vital Signs    /80     BP Location Left upper arm     BP Method Manual     Patient Position Sitting                PT - 23 1206        Physical Therapy    Physical Therapy Specialty Pelvic Floor Program: Age 14+        PT Plan    Frequency of treatment 1 time/week     PT  Duration 3 months     PT Cert From 06/29/23     PT Cert To 09/27/23     Date PT POC was sent to provider 06/29/23     Signed PT Plan of Care received?  No                Assessment and Plan - 08/21/23 1214        Assessment    Plan of Care reviewed and patient/family in agreement Yes     System Pathology/Pathophysiology Noted musculoskeletal;neuromuscular;genitourinary     Rehab Potential/Prognosis good, to achieve stated therapy goals     Problem List abnormal muscle tone;decreased endurance;decreased strength;decreased flexibility;impaired coordination;pain     Clinical Assessment Mini has been attending PFPT for 13 visits, she continues to have discomfort with insertion and deeper penetration. Mini progresses through dilators with minimal pain, she is consistently using level 4 of her dilators. She continues to have muscle guarding in DTP, that reduces quickly with prolonged pressure and diaphragmatic breathing. She will benefit from continued skilled PT to reduce pain and improve function of PFm.     Plan and Recommendations possible discharge     Planned Services CPT 43390 Canalith repositioning procedure/maneuvers;CPT 59128 Gait training;CPT 28797 Manual therapy;CPT 60930 Therapeutic exercises;CPT 90263 Therapeutic activities;CPT 43640 Self-care/Home management training                 OBJECTIVE MEASUREMENTS/DATA:    See below    Outcome Measures        4/6/2023    08:59 6/30/2023    06:29   PT SUBJECTIVE Outcome Measures   PSFS % Score 40 % 55 %       Today's Treatment::    Education provided:  Yes: See treatment log for details of education provided    Mini is a 21 y/o cis gender female presenting to PFPT with c/o dyspareunia and pain with pelvic exams.  Mini went to the gynecologist about a few month ago, she has been having pain with intercourse and was referred to PFPT. She notes always having pain with insertion.     Bladder: Comment:        Urination frequency 6-7x a day   5/11: feeling the  same   6/29: 6-7x   7/27: 6-7x a day  8/21: same   Urgency:  Can hold if she needs to, will sometimes have cramping since her IUD  7/27: no issue with holding   Incontinence Denies    Pads denies   Nocturia 1x a night    Pain denies   Emptying Does not need to strain, feels like she fully empties  6/29: still fully emptying  7/27: same   Prolapse symptoms  Denies    Liquid consumption Drinks about 3-4 refills of 24 oz water bottle, drinks about 3 cups of coffee, pre workout if she is going to the gym   UTI history When she was first sexually active she had 3 UTIs in a month, has not had a UTI recently          Bowel: Comment:        Frequency 2-3x a day, typically spread throughout the day, does not need to strain  5/11: currently on her period so frequency is not her normal   6/29: 2 x a day, 1x a day when she is not working out in the morning   7/27: BMs have been regular  8/21: same   Urge Strong normal urge, can make it    Incontinence denies   Emptying Feels fully empty when she finishes   [place] stool Type 3-4   Fiber Will take fiber, green powder, drink more water and then will take laxatives. Will start taking supplements if she misses a day    Does meal prep, oatmeal or breakfast sandwich or eggs/veggies, potatoes or rice with chicken or turkey for lunch, dinner protein starch and veggie, does a lot protein, will snack on rice cakes and fruit or protein bars and yogurt   Management Fiber, green powder, water and laxatives  5/11: she reports not needing laxatives recently, but has been feeling more bloated on her period   6/29: not needing laxatives recently, having less bloating regularly with probiotics, still feeling bloated around ovulation   Pain Had a hemorrhoid once about a few years ago, denies pain with BMs         OBGYN Comment:        Pregnancies 0   Births 0   Birth type n/a   Tears/ episiotomies n/a   Surgery No surgeries   Menstruation IUD has been lighter flow, consistent and regular, lots of  "cramping but has been less since her IUD. Prior heavier flow, was getting a lot of cramping     5/11: currently on her period, flow is heavier this month                    Sexual Activity Comment:        Type Penis vaginal    Pain Initial insertion will be pain, lasts about 2 minutes, feels it reduces after \"muscle relax,\" will have burning/tearing sensation     Does not notice a difference in pain based on position    Tried lubrication once or twice, will have burning but unsure if it was from initial insertion or the lubrication     History of bleeding post intercourse     Will have discomfort for about 5 min    Always has had pain with intercourse     At worst: 7/10   Post intercourse discomfort: 3-4/10     5/11: Less pain with certain positions, but pain is still present. [place] continues to be most painful period. Pain averages 6/10. Less discomfort following intercourse     6/15: she reports no pain with initial insertion of her partner now, but continues to notice discomfort with deeper penetration that averages a     6/29: Still having pain with deep penetration, little to no pain with initial insertion     7/10, post intercourse pain is less and only averages 1/10     7/27: did not have intercourse since last session, discomfort with level four dilator    8/21: pain can be present for about 30 seconds and will be a 4-5/10 at worst    Orgasm Yes    Masturbation Yes, denies pain   Libido Feels it is nonexistence, feels that it could be related to her pain with intercourse    Pain with speculum insertion          Pain Comment:        Low back pain Went for PT previously, has not been noticing back pain since                                Other Comment:        Physical activity  Go to the gym, will do lifting and cardio, walks outside/hiking, 3x a week typically   PLOF Previous history of dancing, did ballet and modern in college   Living environment Lives with her boyfriend, apartment    Other Works in " coffee shop part time, part time working in the office for [employer]    Stress management Going to the gym, spending time with partner and watching TV             Systems Review:   Cord questions:  Pins and needles or tingling in both arms and both legs at the same time? denies  Problems with stumbling or falling? Denies     Cauda equina questions:  Problems with bowel and bladder control? Specifically retention? See above  Pins and needles or numbness in the saddle area? Denies     Review of systems:  General - (chills, night sweats, recent infection, fever, weight loss/gain, unexplained night pain, excessive fatigue): Denies  Do you have a history of cancer? Denies  Gastrointestinal system - (abdominal pain, bowel changes, nausea, vomiting, bloating): Bloating that is inconsistent, might have it prior to her menstrual period   Cardiovascular system - (chest pain, palpitations, orthopnea, other): Denies  Respiratory system - (cough, SOB, sputum production, other): Denies  Musculoskeletal system: osteoporosis, vertebral fracture? Denies  Endocrine - (polyuria, polydipsia, heat or cold intolerance, other): Denies  Neurological - (numbness/tingling, falling/stumbling, HA, dizziness, diplopia, dysphagia/dysarthria, double vision, tinnitus, memory, drop attacks, other): Denies  Any long-term steroid use? Denies      Patient goals stop having pain with sexual activity     OBJECTIVE FINDINGS:      ASSESSMENT   PELVIS/POSTURE            LUMBAR AROM    Flexion    Extension    rotation    Sidebending        HIP ROM    ER 6/29: WFL   IR 6/29: WFL   Flexion 6/29: WFL   Extension 6/29: WFL   Ankle ROM    Lower Extremity Strength    hip flexion 6/29: B 5/5   hip extension 6/29: R 5/5 L 4+/5   Hip IR 6/29: R 5/5 L 4+/5   Hip ER 6/29: B 5/5   Hip abduction 6/29: B 5/5   Hip adduction 6/29: B 5/5   Knee    Ankle            SPECIAL TESTS    Hamstring length    Hip FADIR    Hip SCOUR    Trendelenburg    Slump test    SLR test    BAM             LUMBAR PALPATION    HIP PALPATION 8/21: proximal adductors are TTP and appear guarded    OTHER PALPATION        SI JOINT TESTING     S/L Compression test    Thigh thrust test    Prone sacral thrust test    Gaenslen test        ABDOMEN    palpation Muscle guarding present and TTP reported with diaphragm palpation and lower abdomen on L>R side    6/29: TTP reported at midline in diaphragm    Lower rib angle 90 degrees   Breathing pattern Min to moderate diaphragm engagement with breathing  6/29: diaphragm engaged with breathing    Diastasis    Skin/ integumentary        BALANCE    GAIT MECHANICS    RUNNING MECHANICS    SQUATTING MECHANICS    LUNGING MECHANICS    BED MOBILITY    SIT TO STAND      Verbal consent give for internal evaluation and treatment. Pt provides verbal consent for internal assessment     ASSESSMENT   EXTERNAL genital EXAM    PFM OBSERVATION    Skin integrity    contraction    relaxation    Bulge/pelvic drop    Cough    Light touch sensation    INTERNAL PFM exam Internal vaginal   Levator Ani Strength  4/5   Power    Endurance    Repetitions    Quick Flicks    Coordination Demonstrates appropriate lengthening and contraction        Palpation TTP with insertion, muscle guarding and TTP in bilat BC, STP and IC on L>R. Muscle guarding present in bilat BC, STP, and IC     5/11: less discomfort reported with initial insertion, muscle guarding present in bilat BC, STP and IC    6/15: targeted deep layer today, she reports 1-2/10 discomfort with bilat OI palpation, muscle guarding present in OI and PC bilat     6/29: No TTP reported with initial insertion, muscle guarding present in bilat OI and PC    7/27: slight TTP with initial insertion that does not linger, muscle guarding present in bilat DTP    8/21: discomfort with deeper palpation, mild muscle guarding in L>R DTP           TAILBONE                    TREATMENT LOG:  verbal consent for internal intervention: Pt provides verbal consent for  internal assessment    Diagnosis  Pelvic Floor Dysfunction    Precautions       PT INITIAL EVALUATION:   4/6/23     PT PROGRESS NOTE:   5/11/23  Re-eval:6/29/23 7/27/23 8/21/23     Y/N Treatment Details: Time:   MODALITIES  85816   0 mins   Heat       Ice       THER ACT          77653   20 mins   OUTCOME MEASURES n PSFS: 40%  6/29: 55%    Falls Screen, Medication Review, VS, pain assessment Y      Pt Education:verbalized understanding of education and returned demonstration appropriately  Y Pelvic floor anatomy/ purpose function, POC, breathing pattern, abdominal tension influence on PFm, pain cycle of vagina   4/13: reviewed pain cycle of vagina, discussed findings on internal assessment, pelvic stability, intimate mabel dilators  5/4: meditation/mindfullness for nervous system down regulation   5/11: dilator options  5/18: dilator training, frequency of dilation, handout provided with setup/cleanup/progression for dilators  6/15: stability training, breathing with exertion, findings on deep layer internal assessment   6/29: progress made so far, plan for future sessions, reviewed progression of dilators   7/13: reviewed dilator frequency   7/27: progressing dilators   8/21: ohnut for limiting depth of penetration, reviewed symptom monitoring/avoiding greater than 3/10 pain with sexual activity or dilator use    HEP / HEP Review Y  eval: quadruped diaphragmatic breathing, incorporating breathing throughout day   4/13: continue breathing, dilators   5/4: child's pose, happy baby, guided relaxation  5/11: pallof press, shoulder extension, flamingo  5/18: dilators  6/1: dilators for home release, cupping to lower abdomen   6/15: bird dog  6/29: SL foam roller   7/13: return to dilators   7/27: half kneel stretch  8/10: Bosu plank progressions  8/21: continue dilators    THER EX         37692   0 mins   pallof press n OTB; x20    Shoulder extension n BTB; x20    Flamingo n BTB; x20    Lunge into hip flexion n 10lb;  x10    Half kneel chops n 10lb, x 15    Standing wrap arounds n 10lbs; 3 x 30sec     PB rollout with march n x15 on 65 cm PB    Bird dog n X20, LE only with PTB     SL foam roller step over n x15    SL foam roller RDL n 9lb kettle bell, x15     Squat into hip flexion n 9lb kettle bell, x 15    SL hip flexion n x20 w/ 2lb ankle weight    Monster walks n PTB x 4 laps    Side steps n PTB x 4 laps    BOSU, rounded side up n Chops with green theraband, x 20    BOSU, rounded side down n bilat UE raise with exhale    Rebounder n SLS on foam and lateral SLS, 1 kg, x 15    Planks n With BOSU, mountain climbers x 10, and lateral taps x 6    Physioball rollout with march n x15    Stir the pot n x15                 NEURO RE-ED    51219   0 min    Diaphragmatic breathing n In quadruped, x10     Pec stretch n 2 min on foam roller    Thoracic extension  n On foam, x 20     Cat/camel n x20     Half kneel stretch n 3 x 30 sec    Guided meditation  n Etelv[place] Full Body Relaxation     MANUAL                 02613   25 mins   Joint Mobilization       Soft Tissue mobilizations External      Soft Tissue Mobilization Internal y Release to bilat DTP     IASTM     y Lower abdomen myofascial release    bilat proximal hip adductors                     Goals Addressed                    This Visit's Progress    •  Mutually agreed upon pain goal         Mutually agreed upon pain goal: 0/10    5/11: 6/10    6/15: 7/10 with deeper penetration     6/29: 7/10 pain with deep penetration     7/27: restriction present with level four dilator, 6/10 pain with initial insertion of level 4     8/21: pain with penis insertion has been 4-5/10       •  pelvic goals (pt-stated)         In 8 weeks Mini will:     Be (I) with pelvic floor contraction/ relaxation/ bearing down in order to improve normal bowel and bladder function. MET    (I) with diaphragmatic breathing for improved Pfm tissue mobility MET    be able to tolerate digital palpation of pelvic  floor as well as digital insertion and removal with pain reduce by 30% in order to tolerate pelvic exams. MET    improve PSFS by 30% in order to improve QOL as well as pelvic floor function. Progressing, PSFS improves from 40% to 55%    In 16 weeks Mini will:   (I) with HEP in order to maintain and aide in gains made with skilled PT services Progressing    (I) with toilet posture and bowel management in order to improve bowel function- MET    Increase fiber intake by 10-15 grams in order to improve bowel function. MET    be able to tolerate digital palpation of pelvic floor as well as digital insertion and removal with pain reduce by 80% in order to tolerate pelvic exams.  Progressing    improve PSFS by 80% in order to improve QOL as well as pelvic floor function. Progressing, PSFS improves from 40% to 55%    be (I) with self abdominal myofascial massage to decrease abdominal pain and dysfunction. MET    Improve gluteal strength by 1/2 MMT GEO in order to decrease demand on pelvic floor and improve overall functional mobility. Progressing    Reduced TTP and fascial restrictions in abdomen by 50% in order to improve pelvic floor function and QOL. MET    Return to pelvic exams without signs/ symptoms of pelvic floor dysfunction. Progressing        •  stop having pain with sexual activity (pt-stated)         5/11: 15% improvement since starting PT    6/15: she feels she has made a 45% improvement since starting PT    6/29: 50% improvement since starting PT, not having pain with initial insertion, still feeling limited in positions secondary to pain    7/27: feeling better about dilator progression    8/21: I feel I have made an 80-85% improvement since starting PT             Andree Kim, PT

## 2023-09-05 ENCOUNTER — VBI (OUTPATIENT)
Dept: ADMINISTRATIVE | Facility: OTHER | Age: 23
End: 2023-09-05

## 2023-09-25 ENCOUNTER — HOSPITAL ENCOUNTER (OUTPATIENT)
Dept: PHYSICAL THERAPY | Age: 23
Setting detail: THERAPIES SERIES
Discharge: HOME | End: 2023-09-25
Attending: NURSE PRACTITIONER
Payer: COMMERCIAL

## 2023-09-25 DIAGNOSIS — M62.89 PELVIC FLOOR DYSFUNCTION: Primary | ICD-10-CM

## 2023-09-25 PROCEDURE — 97530 THERAPEUTIC ACTIVITIES: CPT | Mod: GP

## 2023-09-25 NOTE — OP PT TREATMENT LOG
Mini is a 21 y/o cis gender female presenting to PFPT with c/o dyspareunia and pain with pelvic exams.  Mini went to the gynecologist about a few month ago, she has been having pain with intercourse and was referred to PFPT. She notes always having pain with insertion.     Bladder: Comment:        Urination frequency 6-7x a day   5/11: feeling the same   6/29: 6-7x   7/27: 6-7x a day  8/21: same  9/26: same   Urgency:  Can hold if she needs to, will sometimes have cramping since her IUD  7/27: no issue with holding   Incontinence Denies    Pads denies   Nocturia 1x a night    Pain denies   Emptying Does not need to strain, feels like she fully empties  6/29: still fully emptying  7/27: same   Prolapse symptoms  Denies    Liquid consumption Drinks about 3-4 refills of 24 oz water bottle, drinks about 3 cups of coffee, pre workout if she is going to the gym   UTI history When she was first sexually active she had 3 UTIs in a month, has not had a UTI recently          Bowel: Comment:        Frequency 2-3x a day, typically spread throughout the day, does not need to strain  5/11: currently on her period so frequency is not her normal   6/29: 2 x a day, 1x a day when she is not working out in the morning   7/27: BMs have been regular  8/21: same  9/26: BM frequency has been the same   Urge Strong normal urge, can make it    Incontinence denies   Emptying Feels fully empty when she finishes   [place] stool Type 3-4   Fiber Will take fiber, green powder, drink more water and then will take laxatives. Will start taking supplements if she misses a day    Does meal prep, oatmeal or breakfast sandwich or eggs/veggies, potatoes or rice with chicken or turkey for lunch, dinner protein starch and veggie, does a lot protein, will snack on rice cakes and fruit or protein bars and yogurt   Management Fiber, green powder, water and laxatives  5/11: she reports not needing laxatives recently, but has been feeling more bloated on  "her period   6/29: not needing laxatives recently, having less bloating regularly with probiotics, still feeling bloated around ovulation  9/26: has not needed laxatives    Pain Had a hemorrhoid once about a few years ago, denies pain with BMs         OBGYN Comment:        Pregnancies 0   Births 0   Birth type n/a   Tears/ episiotomies n/a   Surgery No surgeries   Menstruation IUD has been lighter flow, consistent and regular, lots of cramping but has been less since her IUD. Prior heavier flow, was getting a lot of cramping     5/11: currently on her period, flow is heavier this month                    Sexual Activity Comment:        Type Penis vaginal    Pain Initial insertion will be pain, lasts about 2 minutes, feels it reduces after \"muscle relax,\" will have burning/tearing sensation     Does not notice a difference in pain based on position    Tried lubrication once or twice, will have burning but unsure if it was from initial insertion or the lubrication     History of bleeding post intercourse     Will have discomfort for about 5 min    Always has had pain with intercourse     At worst: 7/10   Post intercourse discomfort: 3-4/10     5/11: Less pain with certain positions, but pain is still present. [place] continues to be most painful period. Pain averages 6/10. Less discomfort following intercourse     6/15: she reports no pain with initial insertion of her partner now, but continues to notice discomfort with deeper penetration that averages a     6/29: Still having pain with deep penetration, little to no pain with initial insertion     7/10, post intercourse pain is less and only averages 1/10     7/27: did not have intercourse since last session, discomfort with level four dilator    8/21: pain can be present for about 30 seconds and will be a 4-5/10 at worst     9/25: she is no longer having pain linger after intercourse, with insertion it will be a 4/10 and will last about 10-15 sec   Orgasm Yes  "   Masturbation Yes, denies pain   Libido Feels it is nonexistence, feels that it could be related to her pain with intercourse    Pain with speculum insertion          Pain Comment:        Low back pain Went for PT previously, has not been noticing back pain since                                Other Comment:        Physical activity  Go to the gym, will do lifting and cardio, walks outside/hiking, 3x a week typically   PLOF Previous history of dancing, did ballet and modern in college   Living environment Lives with her boyfriend, apartment    Other Works in coffee shop part time, part time working in the office for [employer]    Stress management Going to the gym, spending time with partner and watching TV             Systems Review:   Cord questions:  Pins and needles or tingling in both arms and both legs at the same time? denies  Problems with stumbling or falling? Denies     Cauda equina questions:  Problems with bowel and bladder control? Specifically retention? See above  Pins and needles or numbness in the saddle area? Denies     Review of systems:  General - (chills, night sweats, recent infection, fever, weight loss/gain, unexplained night pain, excessive fatigue): Denies  Do you have a history of cancer? Denies  Gastrointestinal system - (abdominal pain, bowel changes, nausea, vomiting, bloating): Bloating that is inconsistent, might have it prior to her menstrual period   Cardiovascular system - (chest pain, palpitations, orthopnea, other): Denies  Respiratory system - (cough, SOB, sputum production, other): Denies  Musculoskeletal system: osteoporosis, vertebral fracture? Denies  Endocrine - (polyuria, polydipsia, heat or cold intolerance, other): Denies  Neurological - (numbness/tingling, falling/stumbling, HA, dizziness, diplopia, dysphagia/dysarthria, double vision, tinnitus, memory, drop attacks, other): Denies  Any long-term steroid use? Denies      Patient goals stop having pain with sexual activity      OBJECTIVE FINDINGS:      ASSESSMENT   PELVIS/POSTURE            LUMBAR AROM    Flexion    Extension    rotation    Sidebending        HIP ROM    ER 6/29: WFL   IR 6/29: WFL   Flexion 6/29: WFL   Extension 6/29: WFL   Ankle ROM    Lower Extremity Strength    hip flexion 6/29: B 5/5  9/26: B 5/5   hip extension 6/29: R 5/5 L 4+/5  9/26: B 5/5   Hip IR 6/29: R 5/5 L 4+/5  9/26: B 5/5   Hip ER 6/29: B 5/5  9/26: B 5/5   Hip abduction 6/29: B 5/5  9/26: B 5/5   Hip adduction 6/29: B 5/5  9/26: B 5/5   Knee    Ankle            SPECIAL TESTS    Hamstring length    Hip FADIR    Hip SCOUR    Trendelenburg    Slump test    SLR test    BAM            LUMBAR PALPATION    HIP PALPATION 8/21: proximal adductors are TTP and appear guarded    OTHER PALPATION        SI JOINT TESTING     S/L Compression test    Thigh thrust test    Prone sacral thrust test    Gaenslen test        ABDOMEN    palpation Muscle guarding present and TTP reported with diaphragm palpation and lower abdomen on L>R side    6/29: TTP reported at midline in diaphragm   9/26: denies TTP with abdominal assessment    Lower rib angle 90 degrees  9/26: 90 degrees   Breathing pattern Min to moderate diaphragm engagement with breathing  6/29: diaphragm engaged with breathing  9/26: diaphragm engaged    Diastasis    Skin/ integumentary        BALANCE    GAIT MECHANICS    RUNNING MECHANICS    SQUATTING MECHANICS    LUNGING MECHANICS    BED MOBILITY    SIT TO STAND      Verbal consent give for internal evaluation and treatment. Pt provides verbal consent for internal assessment     ASSESSMENT   EXTERNAL genital EXAM    PFM OBSERVATION    Skin integrity    contraction    relaxation    Bulge/pelvic drop    Cough    Light touch sensation    INTERNAL PFM exam Internal vaginal   Levator Ani Strength  4/5   Power    Endurance    Repetitions    Quick Flicks    Coordination Demonstrates appropriate lengthening and contraction        Palpation TTP with insertion, muscle  guarding and TTP in bilat BC, STP and IC on L>R. Muscle guarding present in bilat BC, STP, and IC     5/11: less discomfort reported with initial insertion, muscle guarding present in bilat BC, STP and IC    6/15: targeted deep layer today, she reports 1-2/10 discomfort with bilat OI palpation, muscle guarding present in OI and PC bilat     6/29: No TTP reported with initial insertion, muscle guarding present in bilat OI and PC    7/27: slight TTP with initial insertion that does not linger, muscle guarding present in bilat DTP    8/21: discomfort with deeper palpation, mild muscle guarding in L>R DTP    9/26: denies TTP with insertion and deeper palpation, appears to have soft, mobile tissue            TAILBONE                    TREATMENT LOG:  verbal consent for internal intervention: Pt provides verbal consent for internal assessment    Diagnosis  Pelvic Floor Dysfunction    Precautions       PT INITIAL EVALUATION:   4/6/23     PT PROGRESS NOTE:   5/11/23  Re-eval:6/29/23 7/27/23 8/21/23     Y/N Treatment Details: Time:   MODALITIES  81686   0 mins   Heat       Ice       THER ACT          94497   40 mins   OUTCOME MEASURES y PSFS: 40%  6/29: 55%  9/25: PSFS: 75% GROC +6    Falls Screen, Medication Review, VS, pain assessment Y      Pt Education:verbalized understanding of education and returned demonstration appropriately  Y Pelvic floor anatomy/ purpose function, POC, breathing pattern, abdominal tension influence on PFm, pain cycle of vagina   4/13: reviewed pain cycle of vagina, discussed findings on internal assessment, pelvic stability, intimate mabel dilators  5/4: meditation/mindfullness for nervous system down regulation   5/11: dilator options  5/18: dilator training, frequency of dilation, handout provided with setup/cleanup/progression for dilators  6/15: stability training, breathing with exertion, findings on deep layer internal assessment   6/29: progress made so far, plan for future sessions,  reviewed progression of dilators   7/13: reviewed dilator frequency   7/27: progressing dilators   8/21: ohnut for limiting depth of penetration, reviewed symptom monitoring/avoiding greater than 3/10 pain with sexual activity or dilator use  9/26: reviewed dilator frequency, when to return to PT in the future if needed, reviewed HEP     HEP / HEP Review Y  eval: quadruped diaphragmatic breathing, incorporating breathing throughout day   4/13: continue breathing, dilators   5/4: child's pose, happy baby, guided relaxation  5/11: pallof press, shoulder extension, flamingo  5/18: dilators  6/1: dilators for home release, cupping to lower abdomen   6/15: bird dog  6/29: SL foam roller   7/13: return to dilators   7/27: half kneel stretch  8/10: Bosu plank progressions  8/21: continue dilators  9/26: HEP and Dilators     THER EX         [id number]   0 mins   pallof press n OTB; x20    Shoulder extension n BTB; x20    Flamingo n BTB; x20    Lunge into hip flexion n 10lb; x10    Half kneel chops n 10lb, x 15    Standing wrap arounds n 10lbs; 3 x 30sec     PB rollout with march n x15 on 65 cm PB    Bird dog n X20, LE only with PTB     SL foam roller step over n x15    SL foam roller RDL n 9lb kettle bell, x15     Squat into hip flexion n 9lb kettle bell, x 15    SL hip flexion n x20 w/ 2lb ankle weight    Monster walks n PTB x 4 laps    Side steps n PTB x 4 laps    BOSU, rounded side up n Chops with green theraband, x 20    BOSU, rounded side down n bilat UE raise with exhale    Rebounder n SLS on foam and lateral SLS, 1 kg, x 15    Planks n With BOSU, mountain climbers x 10, and lateral taps x 6    Physioball rollout with march n x15    Stir the pot n x15                 NEURO RE-ED    [id number]   0 min    Diaphragmatic breathing n In quadruped, x10     Pec stretch n 2 min on foam roller    Thoracic extension  n On foam, x 20     Cat/camel n x20     Half kneel stretch n 3 x 30 sec    Guided meditation  n Etelv[place] Full Body  Relaxation     MANUAL                 [id number]   0 mins   Joint Mobilization       Soft Tissue mobilizations External      Soft Tissue Mobilization Internal n Release to bilat DTP     IASTM n Lower abdomen myofascial release    bilat proximal hip adductors

## 2023-09-26 NOTE — PROGRESS NOTES
Physical Therapy Discharge      PT DISCHARGE NOTE FOR OUTPATIENT THERAPY    Patient: Mini Hodges MRN: [id number]  : 2000 22 y.o.  Referring Physician: Marbella Jackson PA C  Date of Visit: 2023      Certification Dates: 23 through 23    Total Visit Count: 14    Chief Complaints:  No chief complaint on file.      Precautions:  Existing Precautions/Restrictions: no known precautions/restrictions      TODAY'S VISIT:    Time In Session:  Start Time: 1716 (patient arrives late)  Stop Time: 1800  Time Calculation (min): 44 min   General Information - 23 172        Session Details    Document Type discharge evaluation     Mode of Treatment individual therapy        General Information    Referring Physician ABENA Talbert     History of present illness/functional impairment Mini is a 23 y/o cis gender female referred to PFPT by obgyn with c/o dyspareunia and pain with pelvic exams.     Patient/Family/Caregiver Comments/Observations Mini reports she has not been as consistent with dilators, but she has been consistent with her exercises. She reports less discomfort with insertion, if there is pain it does not last as long. She feels that she is able to insert at a faster rate. Mini feels independent with dilators and has met her goals at this time     Existing Precautions/Restrictions no known precautions/restrictions                Daily Falls Screen - 23 1720        Daily Falls Assessment    Patient reported fall since last visit No                Pain/Vitals - 23 1720        Pain Assessment    Currently in pain No/Denies        Pre Activity Vital Signs    /76     BP Location Left upper arm     BP Method Manual     Patient Position Standing                PT - 23 1720        Physical Therapy    Physical Therapy Specialty Pelvic Floor Program: Age 14+        PT Plan    Frequency of treatment 1 time/week     PT Duration 3 months     PT Cert From  06/29/23     PT Cert To 09/27/23     Date PT POC was sent to provider 06/29/23     Signed PT Plan of Care received?  Yes                Assessment and Plan - 09/25/23 1720        Assessment    Plan of Care reviewed and patient/family in agreement Yes     System Pathology/Pathophysiology Noted musculoskeletal;neuromuscular;genitourinary     Rehab Potential/Prognosis good, to achieve stated therapy goals     Problem List abnormal muscle tone;decreased endurance;decreased strength;decreased flexibility;impaired coordination;pain     Clinical Assessment Mini has attended PFPT for 14 visits, she feels she has met her goals at this time and is ready for discharge. She demonstrates improved LE strength and improved breathing pattern. She denies TTP with abdominal assessment. Mini denies TTP with digital insertion and with internal assessment, she appears to have less muscle guarding present and muscle appear soft and mobile. Her PSFS score improves from 40% to 75% and she reports +6 on GROC. Mini verbalizes understanding on when to return to PT if needed in the future. She is appropriate for discharge at this time.     Plan and Recommendations discharge from PT     Planned Services CPT 64678 Gait training;CPT 10325 Manual therapy;CPT 03543 Neuromuscular Reeducation;CPT 12739 Self-care/Home management training;CPT 02769 Therapeutic activities;CPT 29281 Therapeutic exercises                 OBJECTIVE MEASUREMENTS/DATA:    Outcome Measures    PT Outcome Measures - 09/25/23 1720        Other Outcome Measures Used/Comments    Outcome Measures General Comments GROC +6        NEW (2/6/23):  PSFS     PSFS ACTIVITY 1 sexual activity     PSFS ANSWER 1 8     PSFS ACTIVITY 2 Gynecology exams     PSFS ANSWER 2 7     PSFS Sum of Activity Scores 15     PSFS Number of Activities 2     PSFS Percent Score 75 %                   Outcome Measures        4/6/2023    08:59 6/30/2023    06:29 9/25/2023    17:20   PT SUBJECTIVE Outcome  Measures   PSFS % Score 40 % 55 % 75 %       Today's Treatment:    Education provided:  Yes: See treatment log for details of education provided    Mini is a 21 y/o cis gender female presenting to PFPT with c/o dyspareunia and pain with pelvic exams.  Mini went to the gynecologist about a few month ago, she has been having pain with intercourse and was referred to PFPT. She notes always having pain with insertion.     Bladder: Comment:        Urination frequency 6-7x a day   5/11: feeling the same   6/29: 6-7x   7/27: 6-7x a day  8/21: same  9/26: same   Urgency:  Can hold if she needs to, will sometimes have cramping since her IUD  7/27: no issue with holding   Incontinence Denies    Pads denies   Nocturia 1x a night    Pain denies   Emptying Does not need to strain, feels like she fully empties  6/29: still fully emptying  7/27: same   Prolapse symptoms  Denies    Liquid consumption Drinks about 3-4 refills of 24 oz water bottle, drinks about 3 cups of coffee, pre workout if she is going to the gym   UTI history When she was first sexually active she had 3 UTIs in a month, has not had a UTI recently          Bowel: Comment:        Frequency 2-3x a day, typically spread throughout the day, does not need to strain  5/11: currently on her period so frequency is not her normal   6/29: 2 x a day, 1x a day when she is not working out in the morning   7/27: BMs have been regular  8/21: same  9/26: BM frequency has been the same   Urge Strong normal urge, can make it    Incontinence denies   Emptying Feels fully empty when she finishes   Coal stool Type 3-4   Fiber Will take fiber, green powder, drink more water and then will take laxatives. Will start taking supplements if she misses a day    Does meal prep, oatmeal or breakfast sandwich or eggs/veggies, potatoes or rice with chicken or turkey for lunch, dinner protein starch and veggie, does a lot protein, will snack on rice cakes and fruit or protein bars and  "yogurt   Management Fiber, green powder, water and laxatives  5/11: she reports not needing laxatives recently, but has been feeling more bloated on her period   6/29: not needing laxatives recently, having less bloating regularly with probiotics, still feeling bloated around ovulation  9/26: has not needed laxatives    Pain Had a hemorrhoid once about a few years ago, denies pain with BMs         OBGYN Comment:        Pregnancies 0   Births 0   Birth type n/a   Tears/ episiotomies n/a   Surgery No surgeries   Menstruation IUD has been lighter flow, consistent and regular, lots of cramping but has been less since her IUD. Prior heavier flow, was getting a lot of cramping     5/11: currently on her period, flow is heavier this month                    Sexual Activity Comment:        Type Penis vaginal    Pain Initial insertion will be pain, lasts about 2 minutes, feels it reduces after \"muscle relax,\" will have burning/tearing sensation     Does not notice a difference in pain based on position    Tried lubrication once or twice, will have burning but unsure if it was from initial insertion or the lubrication     History of bleeding post intercourse     Will have discomfort for about 5 min    Always has had pain with intercourse     At worst: 7/10   Post intercourse discomfort: 3-4/10     5/11: Less pain with certain positions, but pain is still present. Mission[place] continues to be most painful period. Pain averages 6/10. Less discomfort following intercourse     6/15: she reports no pain with initial insertion of her partner now, but continues to notice discomfort with deeper penetration that averages a     6/29: Still having pain with deep penetration, little to no pain with initial insertion     7/10, post intercourse pain is less and only averages 1/10     7/27: did not have intercourse since last session, discomfort with level four dilator    8/21: pain can be present for about 30 seconds and will be a 4-5/10 at " worst     9/25: she is no longer having pain linger after intercourse, with insertion it will be a 4/10 and will last about 10-15 sec   Orgasm Yes    Masturbation Yes, denies pain   Libido Feels it is nonexistence, feels that it could be related to her pain with intercourse    Pain with speculum insertion          Pain Comment:        Low back pain Went for PT previously, has not been noticing back pain since                                Other Comment:        Physical activity  Go to the gym, will do lifting and cardio, walks outside/hiking, 3x a week typically   PLOF Previous history of dancing, did ballet and modern in [employer]   Living environment Lives with her boyfriend, apartment    Other Works in coffee shop part time, part time working in the office for [employer]    Stress management Going to the gym, spending time with partner and watching TV             Systems Review:   Cord questions:  Pins and needles or tingling in both arms and both legs at the same time? denies  Problems with stumbling or falling? Denies     Cauda equina questions:  Problems with bowel and bladder control? Specifically retention? See above  Pins and needles or numbness in the saddle area? Denies     Review of systems:  General - (chills, night sweats, recent infection, fever, weight loss/gain, unexplained night pain, excessive fatigue): Denies  Do you have a history of cancer? Denies  Gastrointestinal system - (abdominal pain, bowel changes, nausea, vomiting, bloating): Bloating that is inconsistent, might have it prior to her menstrual period   Cardiovascular system - (chest pain, palpitations, orthopnea, other): Denies  Respiratory system - (cough, SOB, sputum production, other): Denies  Musculoskeletal system: osteoporosis, vertebral fracture? Denies  Endocrine - (polyuria, polydipsia, heat or cold intolerance, other): Denies  Neurological - (numbness/tingling, falling/stumbling, HA, dizziness, diplopia, dysphagia/dysarthria, double  vision, tinnitus, memory, drop attacks, other): Denies  Any long-term steroid use? Denies      Patient goals stop having pain with sexual activity     OBJECTIVE FINDINGS:      ASSESSMENT   PELVIS/POSTURE            LUMBAR AROM    Flexion    Extension    rotation    Sidebending        HIP ROM    ER 6/29: WFL   IR 6/29: WFL   Flexion 6/29: WFL   Extension 6/29: WFL   Ankle ROM    Lower Extremity Strength    hip flexion 6/29: B 5/5  9/26: B 5/5   hip extension 6/29: R 5/5 L 4+/5  9/26: B 5/5   Hip IR 6/29: R 5/5 L 4+/5  9/26: B 5/5   Hip ER 6/29: B 5/5  9/26: B 5/5   Hip abduction 6/29: B 5/5  9/26: B 5/5   Hip adduction 6/29: B 5/5  9/26: B 5/5   Knee    Ankle            SPECIAL TESTS    Hamstring length    Hip FADIR    Hip SCOUR    Trendelenburg    Slump test    SLR test    BAM            LUMBAR PALPATION    HIP PALPATION 8/21: proximal adductors are TTP and appear guarded    OTHER PALPATION        SI JOINT TESTING     S/L Compression test    Thigh thrust test    Prone sacral thrust test    Gaenslen test        ABDOMEN    palpation Muscle guarding present and TTP reported with diaphragm palpation and lower abdomen on L>R side    6/29: TTP reported at midline in diaphragm   9/26: denies TTP with abdominal assessment    Lower rib angle 90 degrees  9/26: 90 degrees   Breathing pattern Min to moderate diaphragm engagement with breathing  6/29: diaphragm engaged with breathing  9/26: diaphragm engaged    Diastasis    Skin/ integumentary        BALANCE    GAIT MECHANICS    RUNNING MECHANICS    SQUATTING MECHANICS    LUNGING MECHANICS    BED MOBILITY    SIT TO STAND      Verbal consent give for internal evaluation and treatment. Pt provides verbal consent for internal assessment     ASSESSMENT   EXTERNAL genital EXAM    PFM OBSERVATION    Skin integrity    contraction    relaxation    Bulge/pelvic drop    Cough    Light touch sensation    INTERNAL PFM exam Internal vaginal   Levator Ani Strength  4/5   Power    Endurance     Repetitions    Quick Flicks    Coordination Demonstrates appropriate lengthening and contraction        Palpation TTP with insertion, muscle guarding and TTP in bilat BC, STP and IC on L>R. Muscle guarding present in bilat BC, STP, and IC     5/11: less discomfort reported with initial insertion, muscle guarding present in bilat BC, STP and IC    6/15: targeted deep layer today, she reports 1-2/10 discomfort with bilat OI palpation, muscle guarding present in OI and PC bilat     6/29: No TTP reported with initial insertion, muscle guarding present in bilat OI and PC    7/27: slight TTP with initial insertion that does not linger, muscle guarding present in bilat DTP    8/21: discomfort with deeper palpation, mild muscle guarding in L>R DTP    9/26: denies TTP with insertion and deeper palpation, appears to have soft, mobile tissue            TAILBONE                    TREATMENT LOG:  verbal consent for internal intervention: Pt provides verbal consent for internal assessment    Diagnosis  Pelvic Floor Dysfunction    Precautions       PT INITIAL EVALUATION:   4/6/23     PT PROGRESS NOTE:   5/11/23  Re-eval:6/29/23 7/27/23 8/21/23     Y/N Treatment Details: Time:   MODALITIES  37226   0 mins   Heat       Ice       THER ACT          00218   40 mins   OUTCOME MEASURES y PSFS: 40%  6/29: 55%  9/25: PSFS: 75% GROC +6    Falls Screen, Medication Review, VS, pain assessment Y      Pt Education:verbalized understanding of education and returned demonstration appropriately  Y Pelvic floor anatomy/ purpose function, POC, breathing pattern, abdominal tension influence on PFm, pain cycle of vagina   4/13: reviewed pain cycle of vagina, discussed findings on internal assessment, pelvic stability, intimate mabel dilators  5/4: meditation/mindfullness for nervous system down regulation   5/11: dilator options  5/18: dilator training, frequency of dilation, handout provided with setup/cleanup/progression for dilators  6/15:  stability training, breathing with exertion, findings on deep layer internal assessment   6/29: progress made so far, plan for future sessions, reviewed progression of dilators   7/13: reviewed dilator frequency   7/27: progressing dilators   8/21: ohnut for limiting depth of penetration, reviewed symptom monitoring/avoiding greater than 3/10 pain with sexual activity or dilator use  9/26: reviewed dilator frequency, when to return to PT in the future if needed, reviewed HEP     HEP / HEP Review Y  eval: quadruped diaphragmatic breathing, incorporating breathing throughout day   4/13: continue breathing, dilators   5/4: child's pose, happy baby, guided relaxation  5/11: pallof press, shoulder extension, flamingo  5/18: dilators  6/1: dilators for home release, cupping to lower abdomen   6/15: bird dog  6/29: SL foam roller   7/13: return to dilators   7/27: half kneel stretch  8/10: Bosu plank progressions  8/21: continue dilators  9/26: HEP and Dilators     THER EX         15145   0 mins   pallof press n OTB; x20    Shoulder extension n BTB; x20    Flamingo n BTB; x20    Lunge into hip flexion n 10lb; x10    Half kneel chops n 10lb, x 15    Standing wrap arounds n 10lbs; 3 x 30sec     PB rollout with march n x15 on 65 cm PB    Bird dog n X20, LE only with PTB     SL foam roller step over n x15    SL foam roller RDL n 9lb kettle bell, x15     Squat into hip flexion n 9lb kettle bell, x 15    SL hip flexion n x20 w/ 2lb ankle weight    Monster walks n PTB x 4 laps    Side steps n PTB x 4 laps    BOSU, rounded side up n Chops with green theraband, x 20    BOSU, rounded side down n bilat UE raise with exhale    Rebounder n SLS on foam and lateral SLS, 1 kg, x 15    Planks n With BOSU, mountain climbers x 10, and lateral taps x 6    Physioball rollout with march n x15    Stir the pot n x15                 NEURO RE-ED    31254   0 min    Diaphragmatic breathing n In quadruped, x10     Pec stretch n 2 min on foam roller     Thoracic extension  n On foam, x 20     Cat/camel n x20     Half kneel stretch n 3 x 30 sec    Guided meditation  n Etelvina Conley Full Body Relaxation     MANUAL                 11881   0 mins   Joint Mobilization       Soft Tissue mobilizations External      Soft Tissue Mobilization Internal n Release to bilat DTP     IASTM n Lower abdomen myofascial release    bilat proximal hip adductors                     Goals Addressed                    This Visit's Progress      COMPLETED: Mutually agreed upon pain goal         Mutually agreed upon pain goal: 0/10    5/11: 6/10    6/15: 7/10 with deeper penetration     6/29: 7/10 pain with deep penetration     7/27: restriction present with level four dilator, 6/10 pain with initial insertion of level 4     8/21: pain with penis insertion has been 4-5/10, lasting less than 30 sec        COMPLETED: pelvic goals (pt-stated)         In 8 weeks Mini will:     Be (I) with pelvic floor contraction/ relaxation/ bearing down in order to improve normal bowel and bladder function. MET    (I) with diaphragmatic breathing for improved Pfm tissue mobility MET    be able to tolerate digital palpation of pelvic floor as well as digital insertion and removal with pain reduce by 30% in order to tolerate pelvic exams. MET    improve PSFS by 30% in order to improve QOL as well as pelvic floor function. MET    In 16 weeks Mini will:   (I) with HEP in order to maintain and aide in gains made with skilled PT services MET    (I) with toilet posture and bowel management in order to improve bowel function- MET    Increase fiber intake by 10-15 grams in order to improve bowel function. MET    be able to tolerate digital palpation of pelvic floor as well as digital insertion and removal with pain reduce by 80% in order to tolerate pelvic exams.  MET    improve PSFS by 80% in order to improve QOL as well as pelvic floor function. Progressing, PSFS improves from 40% to 75%    be (I) with self  abdominal myofascial massage to decrease abdominal pain and dysfunction. MET    Improve gluteal strength by 1/2 MMT GEO in order to decrease demand on pelvic floor and improve overall functional mobility. MET    Reduced TTP and fascial restrictions in abdomen by 50% in order to improve pelvic floor function and QOL. MET    Return to pelvic exams without signs/ symptoms of pelvic floor dysfunction. MET          COMPLETED: stop having pain with sexual activity (pt-stated)         5/11: 15% improvement since starting PT    6/15: she feels she has made a 45% improvement since starting PT    6/29: 50% improvement since starting PT, not having pain with initial insertion, still feeling limited in positions secondary to pain    7/27: feeling better about dilator progression    8/21: I feel I have made an 80-85% improvement since starting PT     9/25: 90-95%            Discharge information for [facility]:    Reason for D/C: Goals met  Was care interrupted for medical reason?: No  Did the patient demonstrate increased independence: Yes  Demonstration of independece:: [place] in HEP, Increased functional independence  Has the patient returned to productive activity?: Yes  Increased production assessment:: Return to participation in hobbies/leisure activities, Increased community independence  Skin integrity: Intact

## 2024-04-23 ENCOUNTER — OFFICE VISIT (OUTPATIENT)
Dept: FAMILY MEDICINE CLINIC | Facility: CLINIC | Age: 24
End: 2024-04-23
Payer: COMMERCIAL

## 2024-04-23 VITALS
HEIGHT: 62 IN | DIASTOLIC BLOOD PRESSURE: 84 MMHG | OXYGEN SATURATION: 97 % | WEIGHT: 155 LBS | BODY MASS INDEX: 28.52 KG/M2 | RESPIRATION RATE: 16 BRPM | SYSTOLIC BLOOD PRESSURE: 122 MMHG | HEART RATE: 81 BPM | TEMPERATURE: 98.6 F

## 2024-04-23 DIAGNOSIS — L65.9 HAIR LOSS: ICD-10-CM

## 2024-04-23 DIAGNOSIS — Z00.00 ANNUAL PHYSICAL EXAM: Primary | ICD-10-CM

## 2024-04-23 PROCEDURE — 99395 PREV VISIT EST AGE 18-39: CPT | Performed by: PHYSICIAN ASSISTANT

## 2024-04-23 RX ORDER — CYANOCOBALAMIN (VITAMIN B-12) 500 MCG
TABLET ORAL
COMMUNITY

## 2024-04-23 RX ORDER — NICOTINE POLACRILEX 2 MG
GUM BUCCAL
COMMUNITY

## 2024-04-23 NOTE — PROGRESS NOTES
ADULT ANNUAL PHYSICAL  [facility] PRACTICE    NAME: Mellissa RAMON Young  AGE: 23 y.o. SEX: female  : 2000     DATE: 2024     Assessment and Plan:     23 year old female    Immunizations and preventive care screenings were discussed with patient today. Appropriate education was printed on patient's after visit summary.    Counseling:  Alcohol/drug use: discussed moderation in alcohol intake, the recommendations for healthy alcohol use, and avoidance of illicit drug use.  Dental Health: discussed importance of regular tooth brushing, flossing, and dental visits.  Injury prevention: discussed safety/seat belts, safety helmets, smoke detectors, carbon dioxide detectors, and smoking near bedding or upholstery.  Sexual health: discussed sexually transmitted diseases, partner selection, use of condoms, avoidance of unintended pregnancy, and contraceptive alternatives.  Exercise: the importance of regular exercise/physical activity was discussed. Recommend exercise 3-5 times per week for at least 30 minutes.   Labs ordered per patient     Follow up 1 year     Chief Complaint:     Chief Complaint   Patient presents with    Physical Exam      History of Present Illness:     Adult Annual Physical   Patient here for a comprehensive physical exam. The patient reports no problems.    Diet and Physical Activity  Diet/Nutrition: well balanced diet.   Exercise: moderate cardiovascular exercise.      Depression Screening  PHQ-2/9 Depression Screening    Little interest or pleasure in doing things: 0 - not at all  Feeling down, depressed, or hopeless: 0 - not at all  PHQ-2 Score: 0  PHQ-2 Interpretation: Negative depression screen       General Health  Sleep: sleeps well.   Hearing: normal - bilateral.  Vision: goes for regular eye exams.   Dental: regular dental visits and brushes teeth twice daily.       /GYN Health  Follows with gynecology? yes    Contraceptive method: IUD placement.     Review of Systems:     Review of Systems   Constitutional: Negative.    HENT: Negative.     Eyes: Negative.    Respiratory: Negative.     Cardiovascular: Negative.    Gastrointestinal: Negative.    Endocrine: Negative.    Genitourinary: Negative.    Musculoskeletal: Negative.    Skin: Negative.    Allergic/Immunologic: Negative.    Neurological: Negative.    Hematological: Negative.    Psychiatric/Behavioral: Negative.        Past Medical History:     Past Medical History:   Diagnosis Date    Allergic rhinitis 04/07/2010    Anal fissure     last assessed 5/26/15    Dyspepsia 04/07/2010      Past Surgical History:     Past Surgical History:   Procedure Laterality Date    CYST REMOVAL  09/2023    NO PAST SURGERIES      WISDOM TOOTH EXTRACTION  2018      Social History:     Social History     Socioeconomic History    Marital status: Single     Spouse name: None    Number of children: None    Years of education: None    Highest education level: None   Occupational History    None   Tobacco Use    Smoking status: Never    Smokeless tobacco: Never   Vaping Use    Vaping status: Never Used   Substance and Sexual Activity    Alcohol use: Yes     Comment: Socially    Drug use: No    Sexual activity: Yes     Partners: Male     Birth control/protection: I.U.D.   Other Topics Concern    None   Social History Narrative    Always uses seatbelt.    Occasional caffeine consumption     Social Determinants of Health     Financial Resource Strain: Not on file   Food Insecurity: Not on file   Transportation Needs: Not on file   Physical Activity: Not on file   Stress: Not on file   Social Connections: Not on file   Intimate Partner Violence: Not on file   Housing Stability: Not on file      Family History:     Family History   Problem Relation Age of Onset    Hypothyroidism Mother     Migraines Mother     No Known Problems Father     Mental illness Maternal Grandfather     Bipolar disorder  "Maternal Grandfather     Prostate cancer Paternal Grandfather     Colon cancer Family     Substance Abuse Neg Hx     Alcohol abuse Neg Hx       Current Medications:     Current Outpatient Medications   Medication Sig Dispense Refill    Ascorbic Acid (VITAMIN C PO) Take by mouth      Biotin 1 MG CAPS Take by mouth      COLLAGEN PO Take by mouth      Glucosamine-Chondroitin (GLUCOSAMINE CHONDR COMPLEX PO) Take by mouth      levonorgestrel (KYLEENA) 19.5 MG intrauterine device 1 Intra Uterine Device by Intrauterine route once      Multiple Vitamins-Minerals (ONE-A-DAY WOMENS PO) Take by mouth      Omega-3 Fatty Acids (Fish Oil) 300 MG CAPS Take by mouth      Vitamin D-Vitamin K (VITAMIN K2-VITAMIN D3 PO) Take by mouth       No current facility-administered medications for this visit.      Allergies:     No Known Allergies   Physical Exam:     /84   Pulse 81   Temp 98.6 °F (37 °C) (Temporal)   Resp 16   Ht 5' 2\" (1.575 m)   Wt 70.3 kg (155 lb)   SpO2 97%   BMI 28.35 kg/m²     Physical Exam  Constitutional:       Appearance: Normal appearance. She is well-developed and normal weight.   HENT:      Head: Normocephalic and atraumatic.      Right Ear: Hearing normal.      Left Ear: Hearing normal.      Mouth/Throat:      Pharynx: Uvula midline.   Eyes:      Extraocular Movements: Extraocular movements intact.      Conjunctiva/sclera: Conjunctivae normal.      Pupils: Pupils are equal, round, and reactive to light.   Neck:      Thyroid: No thyromegaly.   Cardiovascular:      Rate and Rhythm: Normal rate and regular rhythm.      Pulses: Normal pulses.      Heart sounds: Normal heart sounds. No murmur heard.  Pulmonary:      Effort: Pulmonary effort is normal.      Breath sounds: Normal breath sounds.   Abdominal:      General: Abdomen is flat. Bowel sounds are normal. There is no distension.      Palpations: Abdomen is soft. There is no mass.      Tenderness: There is no abdominal tenderness.   Musculoskeletal:    "      General: Normal range of motion.      Cervical back: Normal range of motion and neck supple.   Lymphadenopathy:      Cervical: No cervical adenopathy.   Skin:     General: Skin is warm.   Neurological:      General: No focal deficit present.      Mental Status: She is alert and oriented to person, place, and time.      Cranial Nerves: No cranial nerve deficit.      Deep Tendon Reflexes: Reflexes normal.   Psychiatric:         Mood and Affect: Mood normal.         Behavior: Behavior normal.         Thought Content: Thought content normal.         Judgment: Judgment normal.          Eduarda Langford PA-C   [facility]

## 2024-04-29 ENCOUNTER — TELEPHONE (OUTPATIENT)
Dept: PSYCHIATRY | Facility: CLINIC | Age: 24
End: 2024-04-29

## 2024-04-29 NOTE — TELEPHONE ENCOUNTER
"Behavioral Health Outpatient Intake Questions    Referred By   : Self     Please advise interviewee that they need to answer all questions truthfully to allow for best care, and any misrepresentations of information may affect their ability to be seen at this clinic   => Was this discussed? Yes     If Minor Child (under age 18)    Who is/are the legal guardian(s) of the child?     Is there a custody agreement? No     If \"YES\"- Custody orders must be obtained prior to scheduling the first appointment  In addition, Consent to Treatment must be signed by all legal guardians prior to scheduling the first appointment    If \"NO\"- Consent to Treatment must be signed by all legal guardians prior to scheduling the first appointment    Behavioral Health Outpatient Intake History -     Presenting Problem (in patient's own words): Depressed, feeling low, pcp wants pt to get evaluated for possible bipolar as it runs in the family  Are there any communication barriers for this patient?     No                                               If yes, please describe barriers:     If there is a unique situation, please refer to [facility]/Macie Cerda for final determination.    Are you taking any psychiatric medications? No     If \"YES\" -What are they         If \"YES\" -Who prescribes?     Has the Patient previously received outpatient Talk Therapy or Medication Management from [facility]  No        If \"YES\"- When, Where and with Whom?         If \"NO\" -Has Patient received these services elsewhere?       If \"YES\" -When, Where, and with Whom?    Has the Patient abused alcohol or other substances in the last 6 months ? No  No concerns of substance abuse are reported.     If \"YES\" -What substance, How much, How often?     If illegal substance: Refer to [facility] (for ADRIANO) or [facility].   If Alcohol in excess of 10 drinks per week:  Refer to [facility] (for [facility]) or [facility]    Legal History-     Is this treatment " "court ordered? No   If \"yes \"send to :  Talk Therapy : Send to [facility]/Macie Cerda for final determination   Med Management: Send to Dr Juarez for final determination     Has the Patient been convicted of a felony?  No   If \"Yes\" send to -When, What?  Talk Therapy : Send to [facility]/Macie Cerda for final determination   Med Management: Send to Dr Juarez for final determination     ACCEPTED as a patient Yes  If \"Yes\" Appointment Date: NP Appt 7/24 at 8 am Vásquez    Referred Elsewhere? No  If “Yes” - (Where? Ex: [facility], [facility], [facility], [facility], etc.)     Name of Insurance Co:[employer] cross/blue shield  Insurance ID#[id number]   Insurance Phone #[phone]  If ins is primary or secondary? Primary  If patient is a minor, parents information such as Name, D.O.B of guarantor.  "

## 2024-07-23 ENCOUNTER — TELEPHONE (OUTPATIENT)
Dept: PSYCHIATRY | Facility: CLINIC | Age: 24
End: 2024-07-23

## 2024-07-23 NOTE — PSYCH
PSYCHIATRIC EVALUATION     [facility]    Name and Date of Birth:  Mellissa Menon 23 y.o. 2000 MRN: [id number]    Date of Visit: July 24, 2024    Reason for visit: Full psychiatric intake assessment for medication management        Chief Complaint   Patient presents with    Establish Care    Medication Management    Anxiety       HPI:     Mellissa Menon is a 23 y.o. female, engaged, domiciled mother, step-father, brother, grandmother, and fiance, currently employed ( at [employer]), w/ no significant PMH and no significnat PPH, no prior psychiatric admissions, no prior SA, no h/o self-injurious behavior, who presented to the mental health clinic for the initial intake and psychiatric evaluation on July 24, 2024.  Mellissa was self-referred to the clinic not currently on psychotropic medications.  Actively involved in individual psychotherapy with Clarisa Valenzuela (monthly).      Patient presented approximately 1.5 years ago to her OB/GYN and PCP with symptoms of depression, initially thought to be related to PMS.  Concerns of possible bipolar disorder were raised due to family history, prompting a psychiatry referral.  Since then, depressive episodes have been less labile.  Currently, depressive symptoms occurred during the week prior to menstruation, rated 4/10 (10 being worst), including irritability, increased food cravings, tearfulness, and decreased energy.  Cognitive function and sleep are not impacted.  Symptoms resolved within the menstrual week.  Previous symptoms may have been related to life stressors.  In recent months, premenstrual symptoms have been less concerning.    On evaluation today, Mellissa reports seeking treatment to establish care.  Her primary concern is now anxiety.  She reports being a lifelong worrier, with symptoms more prevalent in the past several years.  Anxiety manifestations include spiraling thoughts, excessive  "worrying, irritability, feeling on edge, difficulty with focus and concentration, sleep initiation and maintenance issues, and muscle tension.  No panic attacks reported.  She currently feels \"unsettled and irritable\" for the past several months.  Recent life changes include getting engaged 2 weeks ago and moving in with extended family and fiancé.  This move was triggered by her fiancé's parents' separation and impending divorce, which has increased her anxiety as it recalls her own parents' divorce.  Her work situation is stable, with potential for advancement, though she is uncertain about long-term career goals.  She expresses interest in applying to graduate school for physical therapy in the future.  Appetite remains at baseline with no weight changes.  Energy level is decreased.  She typically maintains a sleep schedule of 9 PM to 5 AM, averaging 8 hours of sleep nightly.  Despite this, she reports difficulties with both falling asleep and staying asleep.  She remains physically active, regularly engaging in gym workouts, hikes, and walks.  She also enjoys traveling and seeking out new experiences.  She denies experiencing anhedonia, hopelessness, worthlessness, or feelings of guilt.  There is no reported thought content related to death.  When directly questioned about safety concerns, she denies any current suicidal or homicidal ideation intent or plan.  PHQ-9 score: 7.  NIKIA-7 score: 16.    Endorses a history history of trauma with hyperarousal symptoms.  However no intrusive, avoidance, negative alterations symptoms of PTSD noted. Denied any history of disordered eating.  No symptoms of OCD including obsessive, ritualistic acts, intrusive thoughts or images noted. No current or history of manic symptoms. She does not exhibit objective evidence of alexandra/hypomania.  Not currently irritable, grandiose, labile, or pathologically euphoric.  No objective evidence of yaneth psychosis.  She is without perceptual " disturbances, such as A/V hallucinations, paranoia, ideas of reference, or delusional beliefs.  Does not appear preoccupied at time of encounter.  Denies recent ETOH or illicit substance abuse.        Psychiatric Review Of Systems:    Sleep changes: yes  Appetite changes: no  Weight changes: no  Energy/anergy: decreased  Interest/pleasure/anhedonia: no  Somatic symptoms: no  Anxiety/panic: yes, worrying daily  Shannon: no  Guilty/hopeless: no  Self injurious behavior/risky behavior: not recently  Suicidal ideation: no  Homicidal ideation: no  Auditory hallucinations: no  Visual hallucinations: no  Other hallucinations: no  Delusional thinking: no  Eating disorder history: no  Obsessive/compulsive symptoms: no          Review Of Systems:    Constitutional low energy and as noted in HPI   ENT negative   Cardiovascular negative   Respiratory negative   Gastrointestinal negative   Genitourinary negative   Musculoskeletal negative   Integumentary negative   Neurological negative   Endocrine negative   Other Symptoms none, all other systems are negative         PHQ-2/9 Depression Screening    Little interest or pleasure in doing things: 0 - not at all  Feeling down, depressed, or hopeless: 1 - several days  Trouble falling or staying asleep, or sleeping too much: 1 - several days  Feeling tired or having little energy: 2 - more than half the days  Poor appetite or overeatin - more than half the days  Feeling bad about yourself - or that you are a failure or have let yourself or your family down: 0 - not at all  Trouble concentrating on things, such as reading the newspaper or watching television: 1 - several days  Moving or speaking so slowly that other people could have noticed. Or the opposite - being so fidgety or restless that you have been moving around a lot more than usual: 0 - not at all  Thoughts that you would be better off dead, or of hurting yourself in some way: 0 - not at all  PHQ-9 Score: 7  PHQ-9  Interpretation: Mild depression         NIKIA-7 Flowsheet Screening      Flowsheet Row Most Recent Value   Over the last two weeks, how often have you been bothered by the following problems?     Feeling nervous, anxious, or on edge 3   Not being able to stop or control worrying 2   Worrying too much about different things 3   Trouble relaxing  3   Being so restless that it's hard to sit still 2   Becoming easily annoyed or irritable  2   Feeling afraid as if something awful might happen 1   How difficult have these problems made it for you to do your work, take care of things at home, or get along with other people?  Not difficult at all   NIKIA Score  16              Past Psychiatric History:      Inpatient psychiatric admissions:   No history of past inpatient psychiatric admissions  Prior outpatient psychiatric linkage:   No history of past outpatient psychiatric treatment  Past/current psychotherapy:   Therapy in the past when parent's were  and intermittently though the years   Has a therapist at private practice; Clarisa Valenzuela; once monthly  Substance abuse inpatient/outpatient rehabilitation:  History of suicidal attempts/gestures: no  History of violence/aggressive behaviors: no  Past Psychiatric Medication Trials: none    Substance Abuse History:     Tobacco/alcohol/caffeine:   Tobacco use:  none  Caffeine Use:  3 cups coffee/day  Alcohol use:  2-3 mixed alcoholic beverages on weekend nights  Other substance use:   Denies history of illicit drug use  Endorses previous experimentation with:  Cannabis approximately 4 years ago (fairly regularly for approximately 1 year)  Longest clean time: N/A  History of Inpatient/Outpatient rehabilitation program: N/A      Social History:      Developmental: Denies a history of milestone/developmental delay. Denies a history of in-utero exposure to toxins/illicit substances. There is no documented history of IEP or need for special education.  Education: college  graduate; dance  Marital history: engaged, Chad, together for 3 years, wedding possibly in the spring  Children: none  Living arrangement, social support: mother, step-father, brother, grandmother, and fiance  Occupational History: employed FT at [employer] as  in [employer]  Access to firearms: Denies direct access to weapons/firearms. Mellissa Menon has no history of arrests or violence with a deadly weapon.     Traumatic History:      Abuse: none  Other Traumatic Events:  parent's divorce       Family Psychiatric History:     Psychiatric Illness: Maternal grandfather bipolar disorder  Substance Abuse:  paternal grandmother ETOH; father ETOH  Suicide Attempts:  none    Family History   Problem Relation Age of Onset    Hypothyroidism Mother     Migraines Mother     No Known Problems Father     Hypertension Maternal Grandmother     Mental illness Maternal Grandfather     Bipolar disorder Maternal Grandfather     Hyperlipidemia Paternal Grandfather     Heart disease Paternal Grandfather     Hypertension Paternal Grandfather     Coronary artery disease Paternal Grandfather     Prostate cancer Paternal Grandfather     Colon cancer Family     Substance Abuse Neg Hx     Alcohol abuse Neg Hx          Past Medical History:    Past Medical History:   Diagnosis Date    Allergic rhinitis 04/07/2010    Anal fissure     last assessed 5/26/15    Dyspepsia 04/07/2010        Past Surgical History:   Procedure Laterality Date    CYST REMOVAL  09/2023    NO PAST SURGERIES      WISDOM TOOTH EXTRACTION  2018     No Known Allergies    History Review:    The following portions of the patient's history were reviewed and updated as appropriate:allergies, current medications, past family history, past medical history, past social history, past surgical history and problem list.    OBJECTIVE:    Vital signs in last 24 hours:    There were no vitals filed for this visit.    Mental Status Evaluation:    Appearance age  appropriate, casually dressed   Behavior cooperative, calm   Speech normal rate, normal volume, normal pitch   Mood anxious   Affect normal range and intensity, appropriate   Thought Processes organized, goal directed   Associations intact associations   Thought Content no overt delusions   Perceptual Disturbances: Denies auditory or visual hallucinations and Does not appear to be responding to internal stimuli   Abnormal Thoughts  Risk Potential Denies suicidal or homicidal ideation, plan, or intent   Orientation oriented to person, place, time/date, and situation   Memory recent and remote memory grossly intact   Consciousness alert and awake   Attention Span Concentration Span attention span and concentration are age appropriate   Intellect appears to be of average intelligence   Insight intact   Judgement intact   Muscle Strength and  Gait normal muscle strength and normal muscle tone, normal gait and normal balance   Motor Activity no abnormal movements   Language no difficulty naming common objects, no difficulty repeating a phrase, no difficulty writing a sentence   Fund of Knowledge adequate knowledge of current events  adequate fund of knowledge regarding past history  adequate fund of knowledge regarding vocabulary      Lab Results: I have personally reviewed all pertinent laboratory/tests results  Most Recent Labs:   Lab Results   Component Value Date    WBC 8.25 10/15/2021    RBC 4.63 10/15/2021    HGB 14.1 10/15/2021    HCT 42.0 10/15/2021     10/15/2021    RDW 12.2 10/15/2021    NEUTROABS 4.39 10/15/2021    SODIUM 137 10/15/2021    K 3.9 10/15/2021     10/15/2021    CO2 27 10/15/2021    BUN 20 10/15/2021    CREATININE 0.79 10/15/2021    CALCIUM 9.3 10/15/2021    AST 16 10/15/2021    ALT 24 10/15/2021    ALKPHOS 56 10/15/2021    TP 7.0 10/15/2021    TBILI 0.41 10/15/2021    [id number] 2.020 10/15/2021       Suicide/Homicide Risk Assessment:    Risk of Harm to Self:  The following ratings  are based on assessment at the time of the interview  Demographic risk factors include: , age: young adult (15-24)  Historical Risk Factors include: history of depression, chronic anxiety symptoms  Recent Specific Risk Factors include: current anxiety symptoms  Protective Factors: no current suicidal ideation, ability to adapt to change, able to manage anger well, access to mental health treatment, compliant with mental health treatment, connection to community, cultural beliefs discouraging suicide, effective coping skills, effective decision-making skills, effective problem solving skills, good health, good self-esteem, having a desire to be alive, having a desire to live, having a sense of purpose or meaning in life, healthy fear of risky behaviors and pain, impulse control, medical compliance, no substance use problems, opportunities to contribute to community, opportunities to participate in community, personal beliefs, personal beliefs about the meaning and value of life, resiliency, responsibilities and duties to others, restricted access to lethal means, stable living environment, stable job, sense of determination, sense of importance of health and wellness, strong relationships, supportive family, supportive friends  Based on today's assessment, Mellissa presents the following risk of harm to self: minimal    Risk of Harm to Others:  The following ratings are based on assessment at the time of the interview  Demographic Risk Factors include: none.  Historical Risk Factors include: none.  Recent Specific Risk Factors include: none.  Protective Factors: no current homicidal ideation  Based on today's assessment, Mellissa presents the following risk of harm to others: none    The following interventions are recommended: no intervention changes needed. Although patient's acute lethality risk is LOW, long-term/chronic lethality risk is mildly elevated given current anxiety symptoms. However, at the  current moment, Mellissa is future-oriented, forward-thinking, and demonstrates ability to act in a self-preserving manner as evidenced by volitionally presenting to the clinic today, seeking treatment.  At this juncture, inpatient hospitalization is not currently warranted. To mitigate future risk, patient should adhere to treatment recommendations, avoid alcohol/illicit substance use, utilize community-based resources and familiar support, and prioritize mental health treatment.     Assessment/Plan:   Mellissa Menon is a 23 y.o. female, engaged, domiciled mother, step-father, brother, grandmother, and fiance, currently employed ( at [email or web address]), w/ no significant PMH and no significnat PPH, no prior psychiatric admissions, no prior SA, no h/o self-injurious behavior, who presented to the mental health clinic for the initial intake and psychiatric evaluation on July 24, 2024.  Mellissa was self-referred to the clinic not currently on psychotropic medications.  Actively involved in individual psychotherapy with Clarisa Valenzuela (monthly).  Mellissa is presenting with a history of depressive symptoms and anxiety.  Approximately 1.5 years ago, she sought out help for depression, initially thought to be related to premenstrual syndrome.  Family history of bipolar disorder prompted psychiatric referral, though her depressive episodes have since become less labile.  Currently, her primary concern is anxiety which has worsened over the past several years.  Anxiety symptoms include excessive worry, difficulty concentrating, irritability, muscle tension, and sleep disturbance.  These symptoms tend to intensify during times of conflict.  Recent life stressors, including her engagement and move prompted by her fiancé's parents divorce, have exacerbated her anxiety, particularly as it recalls her own parents divorce.  She continues to experience mild depressive symptoms during her menstrual cycle.  These symptoms  include irritability, food cravings, tearfulness, and decreased energy, all of which resolve within a week.  Despite these challenges, she maintains an active lifestyle and denies anhedonia, hopelessness, worthlessness, or guilt.  No safety concerns appreciated.  Sleep disturbances are notable, with the patient reporting difficulties in both initiating and maintaining sleep, despite her consistent sleep schedule.  Her energy levels are decreased, though appetite remains stable with no significant weight changes.  She is currently navigating career uncertainties, contemplating a potential shift towards physical therapy, which may be contributing to her overall stress and anxiety.  Mellissa presents with generalized anxiety disorder and possible premenstrual dysphoric disorder.  While depressive symptoms are present, they appear to be cyclical and of moderate severity.  Continued monitoring for bipolar disorder is warranted given the family history, though current presentation does not strongly suggest the diagnosis. PHQ-9 score: 7; NIKIA-7 score: 16.  Her current presentation meets criteria for generalized anxiety disorder R/O PMDD.  Currently she is not at risk for suicide, homicide, self-injury, aggressive behaviors, self-neglect, or neglect of dependents or children. Given this presentation, the patient will benefit from further outpatient follow up for management of her symptoms.       Today's Plan/Medical Decision Making:    Psychopharmacologically, I spoke at length with Mellissa about the bio-psycho-social approach to treatment and avenues for intervention. I stressed the importance of making better dietary choices, expanding exercise regimen, and reestablishing a sense of purpose and connectivity in life.  Mellissa reports anxiety symptoms have been worsening over the past several years and causing dysfunction in her day-to-day activities.  As such, we will start Lexapro optimizing to 10 mg daily which will also  likely address premenstrual symptoms.  Consider dose optimization at next session.         Plan:  Start Lexapro 5 mg for one week, then increase to 10 mg daily and continue for anxiety and premenstrual symptoms  Psychotherapy- continue individual psychotherapy  Follow up with primary care provider for ongoing medical care  Follow up with this provider in 6 weeks     Diagnoses and all orders for this visit:    Generalized anxiety disorder  -     escitalopram (LEXAPRO) 10 mg tablet; Take 0.5 tablets (5 mg total) by mouth daily for 7 days then 1 tablet (10 mg total) daily    Other orders  -     MAGNESIUM GLYCINATE PO; Take 500 mg by mouth daily at bedtime            Treatment Recommendations/Precautions:    Aware of 24 hour and weekend coverage for urgent situations accessed by calling [facility] main practice number      Medications Risks/Benefits:      Risks, Benefits And Possible Side Effects Of Medications:    Risks, benefits, and possible side effects of medications explained to Mellissa including risk of suicidality and serotonin syndrome related to treatment with antidepressants. She verbalizes understanding and agreement for treatment.    Controlled Medication Discussion:     Not applicable    Treatment Plan not complete within time limits due to: Intensive intake, entire session was needed to gather all relevant information.       Visit Time    Visit Start Time: 8:02 AM  Visit Stop Time: 9:02 AM  Total Visit Duration:  60 minutes    The total visit duration detailed above includes: patient engagement, medication management, psychotherapy/counseling, discussion regarding treatment goals, documentation, review of past medical records, and coordination of care.      FRANCESCA Lehman 07/24/24    This note was completed in part utilizing [employer]. Grammatical, translation, syntax errors, random word insertions, spelling mistakes, and incomplete sentences may be an  occasional consequence of this system secondary to software limitations with voice recognition, ambient noise, and hardware issues. If you have any questions or concerns about the content, text, or information contained within the body of this dictation, please contact the provider for clarification.

## 2024-07-23 NOTE — TELEPHONE ENCOUNTER
Writer called and was unable to reach patient by phone as the call did not connect. Called and left message for Mother to confirm NP appointment on 7/24 8AM in office.

## 2024-07-24 ENCOUNTER — OFFICE VISIT (OUTPATIENT)
Dept: PSYCHIATRY | Facility: CLINIC | Age: 24
End: 2024-07-24
Payer: COMMERCIAL

## 2024-07-24 DIAGNOSIS — F41.1 GENERALIZED ANXIETY DISORDER: Primary | ICD-10-CM

## 2024-07-24 PROCEDURE — 90792 PSYCH DIAG EVAL W/MED SRVCS: CPT | Performed by: NURSE PRACTITIONER

## 2024-07-24 RX ORDER — ESCITALOPRAM OXALATE 10 MG/1
5 TABLET ORAL DAILY
Qty: 30 TABLET | Refills: 2 | Status: SHIPPED | OUTPATIENT
Start: 2024-07-24 | End: 2024-07-31

## 2024-08-19 ENCOUNTER — TELEPHONE (OUTPATIENT)
Dept: PSYCHIATRY | Facility: CLINIC | Age: 24
End: 2024-08-19

## 2024-08-19 NOTE — TELEPHONE ENCOUNTER
Called and left message for patient to inform 9/5 4PM virtual was cancelled due to provider being out of the office. Requested return call to reschedule. Please schedule upon return call. Thank you.

## 2024-10-10 ENCOUNTER — TELEPHONE (OUTPATIENT)
Dept: PSYCHIATRY | Facility: CLINIC | Age: 24
End: 2024-10-10

## 2024-10-10 NOTE — TELEPHONE ENCOUNTER
Called and left message for patient to inform 10/10/2024 was cancelled due to provider being out of the office. Requested return call to reschedule. Please schedule upon return call. Thank you.

## 2024-10-24 DIAGNOSIS — F41.1 GENERALIZED ANXIETY DISORDER: ICD-10-CM

## 2024-10-24 RX ORDER — ESCITALOPRAM OXALATE 10 MG/1
5 TABLET ORAL DAILY
Qty: 30 TABLET | Refills: 2 | Status: SHIPPED | OUTPATIENT
Start: 2024-10-24 | End: 2025-04-22

## 2024-10-24 NOTE — TELEPHONE ENCOUNTER
Reason for call:   [x] Refill   [] Prior Auth  [] Other:     Office:   [] PCP/Provider -   [x] Specialty/Provider - [facility]     Medication: Lexapro    Dose/Frequency: 10 mg    Quantity: #30    Pharmacy: [facility] [address] [place] [address]    Does the patient have enough for 3 days?   [x] Yes   [] No - Send as HP to POD

## 2024-11-09 LAB
25(OH)D3+25(OH)D2 SERPL-MCNC: 49 NG/ML (ref 30–100)
ALBUMIN SERPL-MCNC: 4.6 G/DL (ref 3.5–5.7)
ALP SERPL-CCNC: 32 U/L (ref 35–120)
ALT SERPL-CCNC: 22 U/L
ANION GAP SERPL CALCULATED.3IONS-SCNC: 9 MMOL/L (ref 3–11)
AST SERPL-CCNC: 18 U/L
BASOPHILS # BLD AUTO: 0.1 THOU/CMM (ref 0–0.1)
BASOPHILS NFR BLD AUTO: 1 %
BILIRUB SERPL-MCNC: 0.8 MG/DL (ref 0.2–1)
BUN SERPL-MCNC: 15 MG/DL (ref 7–25)
CALCIUM SERPL-MCNC: 9.4 MG/DL (ref 8.5–10.5)
CHLORIDE SERPL-SCNC: 102 MMOL/L (ref 100–109)
CHOLEST SERPL-MCNC: 168 MG/DL
CHOLEST/HDLC SERPL: 2.5 {RATIO}
CO2 SERPL-SCNC: 27 MMOL/L (ref 21–31)
CREAT SERPL-MCNC: 0.72 MG/DL (ref 0.4–1.1)
CYTOLOGY CMNT CVX/VAG CYTO-IMP: ABNORMAL
DIFFERENTIAL METHOD BLD: ABNORMAL
EOSINOPHIL # BLD AUTO: 0.3 THOU/CMM (ref 0–0.5)
EOSINOPHIL NFR BLD AUTO: 4 %
ERYTHROCYTE [DISTWIDTH] IN BLOOD BY AUTOMATED COUNT: 12.1 % (ref 12–16)
GFR/BSA.PRED SERPLBLD CYS-BASED-ARV: 120 ML/MIN/{1.73_M2}
GLUCOSE SERPL-MCNC: 83 MG/DL (ref 65–99)
HCT VFR BLD AUTO: 42.2 % (ref 35–43)
HDLC SERPL-MCNC: 68 MG/DL (ref 23–92)
HGB BLD-MCNC: 15 G/DL (ref 11.5–14.5)
LDLC SERPL CALC-MCNC: 89 MG/DL
LYMPHOCYTES # BLD AUTO: 2.2 THOU/CMM (ref 1–3)
LYMPHOCYTES NFR BLD AUTO: 27 %
MCH RBC QN AUTO: 31.4 PG (ref 26–34)
MCHC RBC AUTO-ENTMCNC: 35.6 G/DL (ref 32–37)
MCV RBC AUTO: 88 FL (ref 80–100)
MONOCYTES # BLD AUTO: 1 THOU/CMM (ref 0.3–1)
MONOCYTES NFR BLD AUTO: 13 %
NEUTROPHILS # BLD AUTO: 4.5 THOU/CMM (ref 1.8–7.8)
NEUTROPHILS NFR BLD AUTO: 55 %
NONHDLC SERPL-MCNC: 100 MG/DL
PLATELET # BLD AUTO: 227 THOU/CMM (ref 140–350)
PMV BLD REES-ECKER: 9.3 FL (ref 7.5–11.3)
POTASSIUM SERPL-SCNC: 4.6 MMOL/L (ref 3.5–5.2)
PROT SERPL-MCNC: 6.9 G/DL (ref 6.3–8.3)
RBC # BLD AUTO: 4.78 MILL/CMM (ref 3.7–4.7)
SODIUM SERPL-SCNC: 138 MMOL/L (ref 135–145)
TRIGL SERPL-MCNC: 54 MG/DL
TSH SERPL-ACNC: 1.31 UIU/ML (ref 0.45–5.33)
WBC # BLD AUTO: 8 THOU/CMM (ref 4–10)

## 2025-01-27 DIAGNOSIS — F41.1 GENERALIZED ANXIETY DISORDER: ICD-10-CM

## 2025-01-28 RX ORDER — ESCITALOPRAM OXALATE 10 MG/1
10 TABLET ORAL DAILY
Qty: 30 TABLET | Refills: 2 | OUTPATIENT
Start: 2025-01-28

## 2025-01-28 NOTE — TELEPHONE ENCOUNTER
Called Hellen and informed her that we received an electronic request from her pharmacy for a refill of her Escitalopram.  However she has not followed up with our office.  Recommended that she follow up with her PCP if she plans to continue taking medication and to make sure medication is working well for her.  Hellen will contact her PCP.

## 2025-01-28 NOTE — TELEPHONE ENCOUNTER
Patient has not been seen for follow up after starting medication.  Please have her contact PCP for refill as she can also assess for efficacy/side effects.

## 2025-01-31 ENCOUNTER — OFFICE VISIT (OUTPATIENT)
Dept: FAMILY MEDICINE CLINIC | Facility: CLINIC | Age: 25
End: 2025-01-31
Payer: COMMERCIAL

## 2025-01-31 VITALS
RESPIRATION RATE: 16 BRPM | BODY MASS INDEX: 30.73 KG/M2 | TEMPERATURE: 98 F | HEART RATE: 65 BPM | WEIGHT: 167 LBS | DIASTOLIC BLOOD PRESSURE: 70 MMHG | OXYGEN SATURATION: 98 % | HEIGHT: 62 IN | SYSTOLIC BLOOD PRESSURE: 110 MMHG

## 2025-01-31 DIAGNOSIS — F32.2 SEVERE MAJOR DEPRESSIVE DISORDER (HCC): Primary | ICD-10-CM

## 2025-01-31 DIAGNOSIS — F41.1 GENERALIZED ANXIETY DISORDER: ICD-10-CM

## 2025-01-31 PROCEDURE — 99213 OFFICE O/P EST LOW 20 MIN: CPT | Performed by: PHYSICIAN ASSISTANT

## 2025-01-31 RX ORDER — ESCITALOPRAM OXALATE 10 MG/1
10 TABLET ORAL DAILY
Qty: 100 TABLET | Refills: 3 | Status: SHIPPED | OUTPATIENT
Start: 2025-01-31 | End: 2025-07-30

## 2025-01-31 NOTE — PROGRESS NOTES
"Assessment/Plan:    NIKIA - well controlled on lexapro 10 mg once daily, continue as, encouraged healthy lifestyle      F/u physical 4/2025 or sooner if needed    Subjective:   Chief Complaint   Patient presents with    Anxiety    Depression      Patient ID: Mellissa Menon is a 24 y.o. female.    Went to see psychiatrist back in July. Dx with NIKIA, which all women have in her her family. Patient has been put on Lexapro and it is \"GREAT\".  and family very happy with this. Feeling well, sleeping well. Has gained some weight but slowly losing it.    Patient psych CRNP left the practice and patient was advised to come here for use to take over.     Moving to [place] in May with fiance    Anxiety        Depression        The following portions of the patient's history were reviewed and updated as appropriate: allergies, current medications, past family history, past medical history, past social history, past surgical history, and problem list.    Past Medical History:   Diagnosis Date    Allergic rhinitis 04/07/2010    Anal fissure     last assessed 5/26/15    Dyspepsia 04/07/2010     Past Surgical History:   Procedure Laterality Date    CYST REMOVAL  09/2023    NO PAST SURGERIES      WISDOM TOOTH EXTRACTION  2018     Family History   Problem Relation Age of Onset    Hypothyroidism Mother     Migraines Mother     No Known Problems Father     Hypertension Maternal Grandmother     Mental illness Maternal Grandfather     Bipolar disorder Maternal Grandfather     Hyperlipidemia Paternal Grandfather     Heart disease Paternal Grandfather     Hypertension Paternal Grandfather     Coronary artery disease Paternal Grandfather     Prostate cancer Paternal Grandfather     Colon cancer Family     Substance Abuse Neg Hx     Alcohol abuse Neg Hx      Social History     Socioeconomic History    Marital status: Single     Spouse name: Not on file    Number of children: Not on file    Years of education: Not on file    Highest " "education level: Not on file   Occupational History    Not on file   Tobacco Use    Smoking status: Never    Smokeless tobacco: Never   Vaping Use    Vaping status: Never Used   Substance and Sexual Activity    Alcohol use: Yes     Comment: Socially    Drug use: No    Sexual activity: Yes     Partners: Male     Birth control/protection: I.U.D.   Other Topics Concern    Not on file   Social History Narrative    Always uses seatbelt.    Occasional caffeine consumption     Social Drivers of Health     Financial Resource Strain: Not on file   Food Insecurity: Not on file   Transportation Needs: Not on file   Physical Activity: Not on file   Stress: Not on file   Social Connections: Not on file   Intimate Partner Violence: Not on file   Housing Stability: Not on file       Current Outpatient Medications:     Ascorbic Acid (VITAMIN C PO), Take by mouth, Disp: , Rfl:     Biotin 1 MG CAPS, Take by mouth, Disp: , Rfl:     escitalopram (LEXAPRO) 10 mg tablet, Take 0.5 tablets (5 mg total) by mouth daily then 1 tablet (10 mg total) daily, Disp: 30 tablet, Rfl: 2    Glucosamine-Chondroitin (GLUCOSAMINE CHONDR COMPLEX PO), Take by mouth, Disp: , Rfl:     levonorgestrel (KYLEENA) 19.5 MG intrauterine device, 1 Intra Uterine Device by Intrauterine route once, Disp: , Rfl:     MAGNESIUM GLYCINATE PO, Take 500 mg by mouth daily at bedtime, Disp: , Rfl:     Multiple Vitamins-Minerals (ONE-A-DAY WOMENS PO), Take by mouth, Disp: , Rfl:     Omega-3 Fatty Acids (Fish Oil) 300 MG CAPS, Take by mouth, Disp: , Rfl:     Vitamin D-Vitamin K (VITAMIN K2-VITAMIN D3 PO), Take by mouth, Disp: , Rfl:     COLLAGEN PO, Take by mouth, Disp: , Rfl:     Review of Systems   Psychiatric/Behavioral:  Positive for depression.              Objective:    Vitals:    01/31/25 1438   BP: 110/70   Pulse: 65   Resp: 16   Temp: 98 °F (36.7 °C)   TempSrc: Temporal   SpO2: 98%   Weight: 75.8 kg (167 lb)   Height: 5' 2\" (1.575 m)        Physical Exam  Constitutional: "       Appearance: Normal appearance. She is well-developed and normal weight.   HENT:      Head: Normocephalic and atraumatic.   Cardiovascular:      Rate and Rhythm: Normal rate and regular rhythm.      Pulses: Normal pulses.      Heart sounds: Normal heart sounds.   Pulmonary:      Effort: Pulmonary effort is normal.      Breath sounds: Normal breath sounds.   Musculoskeletal:         General: Normal range of motion.      Cervical back: Normal range of motion and neck supple.   Skin:     General: Skin is warm.   Neurological:      General: No focal deficit present.      Mental Status: She is alert and oriented to person, place, and time.   Psychiatric:         Mood and Affect: Mood normal.         Behavior: Behavior normal.         Thought Content: Thought content normal.         Judgment: Judgment normal.

## 2025-03-25 ENCOUNTER — OFFICE VISIT (OUTPATIENT)
Dept: FAMILY MEDICINE CLINIC | Facility: CLINIC | Age: 25
End: 2025-03-25
Payer: COMMERCIAL

## 2025-03-25 VITALS
BODY MASS INDEX: 30.36 KG/M2 | RESPIRATION RATE: 16 BRPM | TEMPERATURE: 98 F | HEART RATE: 66 BPM | OXYGEN SATURATION: 98 % | HEIGHT: 62 IN | WEIGHT: 165 LBS | DIASTOLIC BLOOD PRESSURE: 80 MMHG | SYSTOLIC BLOOD PRESSURE: 122 MMHG

## 2025-03-25 DIAGNOSIS — Z11.1 SCREENING FOR TUBERCULOSIS: ICD-10-CM

## 2025-03-25 DIAGNOSIS — Z01.84 IMMUNITY STATUS TESTING: Primary | ICD-10-CM

## 2025-03-25 PROCEDURE — 99213 OFFICE O/P EST LOW 20 MIN: CPT | Performed by: PHYSICIAN ASSISTANT

## 2025-03-25 PROCEDURE — 86580 TB INTRADERMAL TEST: CPT

## 2025-03-25 NOTE — PROGRESS NOTES
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 Needs to review records and immunization requirements.      Review of Systems    Objective   /80   Pulse 66   Temp 98 °F (36.7 °C) (Temporal)   Resp 16   Ht 5' 2\" (1.575 m)   Wt 74.8 kg (165 lb)   SpO2 98%   BMI 30.18 kg/m²      Physical Exam  Constitutional:       Appearance: Normal appearance.   HENT:      Head: Normocephalic and atraumatic.   Neurological:      General: No focal deficit present.      Mental Status: She is alert and oriented to person, place, and time.   Psychiatric:         Mood and Affect: Mood normal.         Behavior: Behavior normal.         Thought Content: Thought content normal.         Judgment: Judgment normal.         "

## 2025-03-27 ENCOUNTER — CLINICAL SUPPORT (OUTPATIENT)
Dept: FAMILY MEDICINE CLINIC | Facility: CLINIC | Age: 25
End: 2025-03-27

## 2025-03-27 DIAGNOSIS — Z11.1 ENCOUNTER FOR PPD SKIN TEST READING: Primary | ICD-10-CM

## 2025-03-27 LAB
INDURATION: 0 MM
TB SKIN TEST: NEGATIVE

## 2025-03-27 PROCEDURE — NURSE

## 2025-04-02 ENCOUNTER — APPOINTMENT (OUTPATIENT)
Dept: LAB | Facility: MEDICAL CENTER | Age: 25
End: 2025-04-02
Payer: COMMERCIAL

## 2025-04-02 DIAGNOSIS — Z01.84 IMMUNITY STATUS TESTING: ICD-10-CM

## 2025-04-02 PROCEDURE — 36415 COLL VENOUS BLD VENIPUNCTURE: CPT

## 2025-04-02 PROCEDURE — 86706 HEP B SURFACE ANTIBODY: CPT

## 2025-04-02 PROCEDURE — 86787 VARICELLA-ZOSTER ANTIBODY: CPT

## 2025-04-02 PROCEDURE — 86735 MUMPS ANTIBODY: CPT

## 2025-04-02 PROCEDURE — 86762 RUBELLA ANTIBODY: CPT

## 2025-04-02 PROCEDURE — 86765 RUBEOLA ANTIBODY: CPT

## 2025-04-03 ENCOUNTER — OCCMED (OUTPATIENT)
Dept: URGENT CARE | Facility: CLINIC | Age: 25
End: 2025-04-03

## 2025-04-03 DIAGNOSIS — Z02.83 ENCOUNTER FOR DRUG SCREENING: Primary | ICD-10-CM

## 2025-04-03 LAB
HBV SURFACE AB SER-ACNC: <3 MIU/ML
VZV IGG SER QL IA: 0.33 S/CO
VZV IGG SER QL IA: NEGATIVE

## 2025-04-04 ENCOUNTER — CLINICAL SUPPORT (OUTPATIENT)
Dept: FAMILY MEDICINE CLINIC | Facility: CLINIC | Age: 25
End: 2025-04-04
Payer: COMMERCIAL

## 2025-04-04 DIAGNOSIS — Z23 ENCOUNTER FOR IMMUNIZATION: Primary | ICD-10-CM

## 2025-04-04 DIAGNOSIS — Z11.1 SCREENING FOR TUBERCULOSIS: ICD-10-CM

## 2025-04-04 LAB
MEV IGG SER IA-ACNC: 54.4 AU/ML
MUV IGG SER IA-ACNC: 24.5 AU/ML
RUBV IGG SERPL IA-ACNC: 18.8 INDEX

## 2025-04-04 PROCEDURE — 90716 VAR VACCINE LIVE SUBQ: CPT

## 2025-04-04 PROCEDURE — 90471 IMMUNIZATION ADMIN: CPT

## 2025-04-04 PROCEDURE — 86580 TB INTRADERMAL TEST: CPT

## 2025-04-04 PROCEDURE — 90746 HEPB VACCINE 3 DOSE ADULT IM: CPT

## 2025-04-04 PROCEDURE — 90472 IMMUNIZATION ADMIN EACH ADD: CPT

## 2025-04-07 ENCOUNTER — CLINICAL SUPPORT (OUTPATIENT)
Dept: FAMILY MEDICINE CLINIC | Facility: CLINIC | Age: 25
End: 2025-04-07

## 2025-04-07 ENCOUNTER — TELEPHONE (OUTPATIENT)
Dept: FAMILY MEDICINE CLINIC | Facility: CLINIC | Age: 25
End: 2025-04-07

## 2025-04-07 DIAGNOSIS — Z11.1 ENCOUNTER FOR PPD SKIN TEST READING: Primary | ICD-10-CM

## 2025-04-07 LAB
INDURATION: 0 MM
TB SKIN TEST: NEGATIVE

## 2025-04-07 PROCEDURE — NURSE

## 2025-04-07 NOTE — TELEPHONE ENCOUNTER
Pt was here for varicella and hep b boosters on Friday. School is requiring repeat titers as well. How long after immunizations should she have these done? Can you place orders?

## 2025-04-08 DIAGNOSIS — Z01.84 IMMUNITY STATUS TESTING: ICD-10-CM

## 2025-04-08 DIAGNOSIS — Z11.59 NEED FOR HEPATITIS B SCREENING TEST: Primary | ICD-10-CM

## 2025-04-24 ENCOUNTER — OFFICE VISIT (OUTPATIENT)
Dept: FAMILY MEDICINE CLINIC | Facility: CLINIC | Age: 25
End: 2025-04-24
Payer: COMMERCIAL

## 2025-04-24 VITALS
RESPIRATION RATE: 16 BRPM | BODY MASS INDEX: 30 KG/M2 | HEIGHT: 62 IN | DIASTOLIC BLOOD PRESSURE: 76 MMHG | TEMPERATURE: 98.2 F | WEIGHT: 163 LBS | SYSTOLIC BLOOD PRESSURE: 122 MMHG

## 2025-04-24 DIAGNOSIS — Z00.00 ANNUAL PHYSICAL EXAM: Primary | ICD-10-CM

## 2025-04-24 PROCEDURE — 99395 PREV VISIT EST AGE 18-39: CPT | Performed by: PHYSICIAN ASSISTANT

## 2025-04-24 NOTE — PROGRESS NOTES
Adult Annual Physical  Name: Mellissa Menon      : 2000      MRN: [id number]  Encounter Provider: Eduarda Langford PA-C  Encounter Date: 2025   Encounter department: [facility]    :  Assessment & Plan  Annual physical exam             Preventive Screenings:  - Diabetes Screening: screening up-to-date  - Cholesterol Screening: screening up-to-date   - Hepatitis C screening: screening up-to-date   - Cervical cancer screening: screening up-to-date   - Colon cancer screening: screening not indicated   - Lung cancer screening: screening not indicated     Immunizations:    - Risks/benefits immunizations discussed      Counseling/Anticipatory Guidance:    - Dental health: discussed importance of regular tooth brushing, flossing, and dental visits.   - Sexual health: discussed sexually transmitted diseases, partner selection, use of condoms, avoidance of unintended pregnancy, and contraceptive alternatives.   - Diet: discussed recommendations for a healthy/well-balanced diet.   - Exercise: the importance of regular exercise/physical activity was discussed. Recommend exercise 3-5 times per week for at least 30 minutes.   - Injury prevention: discussed safety/seat belts, safety helmets, smoke detectors, carbon monoxide detectors, and smoking near bedding or upholstery.          History of Present Illness     Adult Annual Physical:  Patient presents for annual physical.     Diet and Physical Activity:  - Diet/Nutrition: well balanced diet, portion control, limited junk food, consuming 3-5 servings of fruits/vegetables daily and adequate fiber intake.  - Exercise: moderate cardiovascular exercise, strength training exercises, 3-4 times a week on average and 30-60 minutes on average.    Depression Screening:    - PHQ-9 Score: 3    General Health:  - Sleep: sleeps well, 7-8 hours of sleep on average and > 8 hours of sleep on average.  - Hearing: normal hearing  "bilateral ears.  - Vision: most recent eye exam < 1 year ago and wears glasses.  - Dental: regular dental visits, brushes teeth twice daily and does not floss.    /GYN Health:  - Follows with GYN: yes.   - Menopause: premenopausal.   - Last menstrual cycle: 4/16/2025.   - History of STDs: no  - Contraception: IUD placement.      Advanced Care Planning:  - Has an advanced directive?: no    - Has a durable medical POA?: no      Review of Systems   Constitutional: Negative.    HENT: Negative.     Eyes: Negative.    Respiratory: Negative.     Cardiovascular: Negative.    Gastrointestinal: Negative.    Endocrine: Negative.    Genitourinary: Negative.    Musculoskeletal: Negative.    Skin: Negative.    Allergic/Immunologic: Negative.    Neurological: Negative.    Hematological: Negative.    Psychiatric/Behavioral: Negative.       Medical History Reviewed by provider this encounter:  Allergies     .    Objective   /76   Temp 98.2 °F (36.8 °C) (Temporal)   Resp 16   Ht 5' 2\" (1.575 m)   Wt 73.9 kg (163 lb)   LMP 04/16/2025   BMI 29.81 kg/m²     Physical Exam  Constitutional:       Appearance: Normal appearance. She is well-developed and normal weight.   HENT:      Head: Normocephalic and atraumatic.      Right Ear: Hearing and external ear normal.      Left Ear: Hearing and external ear normal.      Mouth/Throat:      Pharynx: Uvula midline.   Eyes:      Extraocular Movements: Extraocular movements intact.      Conjunctiva/sclera: Conjunctivae normal.      Pupils: Pupils are equal, round, and reactive to light.   Neck:      Thyroid: No thyromegaly.   Cardiovascular:      Rate and Rhythm: Normal rate and regular rhythm.      Pulses: Normal pulses.      Heart sounds: Normal heart sounds. No murmur heard.  Pulmonary:      Effort: Pulmonary effort is normal.      Breath sounds: Normal breath sounds.   Abdominal:      General: Abdomen is flat. Bowel sounds are normal. There is no distension.      Palpations: " Abdomen is soft. There is no mass.      Tenderness: There is no abdominal tenderness.   Musculoskeletal:         General: Normal range of motion.      Cervical back: Normal range of motion and neck supple.   Lymphadenopathy:      Cervical: No cervical adenopathy.   Skin:     General: Skin is warm.   Neurological:      General: No focal deficit present.      Mental Status: She is alert and oriented to person, place, and time.      Cranial Nerves: No cranial nerve deficit.      Deep Tendon Reflexes: Reflexes normal.   Psychiatric:         Mood and Affect: Mood normal.         Behavior: Behavior normal.         Thought Content: Thought content normal.         Judgment: Judgment normal.

## 2025-05-02 ENCOUNTER — APPOINTMENT (OUTPATIENT)
Dept: LAB | Facility: MEDICAL CENTER | Age: 25
End: 2025-05-02
Payer: COMMERCIAL

## 2025-05-02 DIAGNOSIS — Z11.59 NEED FOR HEPATITIS B SCREENING TEST: ICD-10-CM

## 2025-05-02 DIAGNOSIS — Z01.84 IMMUNITY STATUS TESTING: ICD-10-CM

## 2025-05-02 PROCEDURE — 86706 HEP B SURFACE ANTIBODY: CPT

## 2025-05-02 PROCEDURE — 36415 COLL VENOUS BLD VENIPUNCTURE: CPT

## 2025-05-02 PROCEDURE — 86787 VARICELLA-ZOSTER ANTIBODY: CPT

## 2025-05-03 LAB
HBV SURFACE AB SER-ACNC: 35.1 MIU/ML
VZV IGG SER QL IA: 6.24 S/CO
VZV IGG SER QL IA: POSITIVE

## 2025-05-05 ENCOUNTER — RESULTS FOLLOW-UP (OUTPATIENT)
Dept: FAMILY MEDICINE CLINIC | Facility: CLINIC | Age: 25
End: 2025-05-05